# Patient Record
Sex: FEMALE | Race: WHITE | NOT HISPANIC OR LATINO | Employment: FULL TIME | URBAN - METROPOLITAN AREA
[De-identification: names, ages, dates, MRNs, and addresses within clinical notes are randomized per-mention and may not be internally consistent; named-entity substitution may affect disease eponyms.]

---

## 2017-05-03 LAB
25(OH)D3 SERPL-MCNC: 11 NG/ML (ref 30–100)
ALBUMIN SERPL-MCNC: 4.1 G/DL (ref 3.6–5.1)
ALBUMIN/GLOB SERPL: 1.4 (CALC) (ref 1–2.5)
ALP SERPL-CCNC: 94 U/L (ref 33–115)
ALT SERPL-CCNC: 30 U/L (ref 6–29)
APPEARANCE UR: ABNORMAL
AST SERPL-CCNC: 19 U/L (ref 10–30)
BASOPHILS # BLD AUTO: 34 CELLS/UL (ref 0–200)
BASOPHILS NFR BLD AUTO: 0.4 %
BILIRUB DIRECT SERPL-MCNC: 0.2 MG/DL
BILIRUB INDIRECT SERPL-MCNC: 0.6 MG/DL (CALC) (ref 0.2–1.2)
BILIRUB SERPL-MCNC: 0.8 MG/DL (ref 0.2–1.2)
BILIRUB UR QL STRIP: NEGATIVE
BUN SERPL-MCNC: 9 MG/DL (ref 7–25)
BUN/CREAT SERPL: NORMAL (CALC) (ref 6–22)
CALCIT SERPL-MCNC: <2 PG/ML
CALCIUM SERPL-MCNC: 9.2 MG/DL (ref 8.6–10.2)
CHLORIDE SERPL-SCNC: 104 MMOL/L (ref 98–110)
CHOLEST SERPL-MCNC: 172 MG/DL (ref 125–200)
CHOLEST/HDLC SERPL: 3.4 (CALC)
CO2 SERPL-SCNC: 26 MMOL/L (ref 20–31)
COLOR UR: YELLOW
CREAT SERPL-MCNC: 0.76 MG/DL (ref 0.5–1.1)
EOSINOPHIL # BLD AUTO: 50 CELLS/UL (ref 15–500)
EOSINOPHIL NFR BLD AUTO: 0.6 %
ERYTHROCYTE [DISTWIDTH] IN BLOOD BY AUTOMATED COUNT: 14.4 % (ref 11–15)
FERRITIN SERPL-MCNC: 79 NG/ML (ref 10–154)
FOLATE SERPL-MCNC: 11.5 NG/ML
GLOBULIN SER CALC-MCNC: 3 G/DL (CALC) (ref 1.9–3.7)
GLUCOSE SERPL-MCNC: 87 MG/DL (ref 65–99)
GLUCOSE UR QL STRIP: NEGATIVE
HAV AB SER QL IA: REACTIVE
HBA1C MFR BLD: 5 % OF TOTAL HGB
HBV CORE AB SERPL QL IA: ABNORMAL
HBV SURFACE AB SER QL IA: REACTIVE
HBV SURFACE AG SERPL QL IA: ABNORMAL
HCT VFR BLD AUTO: 42.4 % (ref 35–45)
HCV AB S/CO SERPL IA: 0.02
HCV AB SERPL QL IA: ABNORMAL
HDLC SERPL-MCNC: 51 MG/DL
HGB BLD-MCNC: 14 G/DL (ref 11.7–15.5)
HGB UR QL STRIP: NEGATIVE
KETONES UR QL STRIP: NEGATIVE
LDLC SERPL CALC-MCNC: 101 MG/DL (CALC)
LEUKOCYTE ESTERASE UR QL STRIP: NEGATIVE
LYMPHOCYTES # BLD AUTO: 2495 CELLS/UL (ref 850–3900)
LYMPHOCYTES NFR BLD AUTO: 29.7 %
MAGNESIUM SERPL-MCNC: 2.3 MG/DL (ref 1.5–2.5)
MCH RBC QN AUTO: 28 PG (ref 27–33)
MCHC RBC AUTO-ENTMCNC: 33 G/DL (ref 32–36)
MCV RBC AUTO: 84.8 FL (ref 80–100)
MONOCYTES # BLD AUTO: 361 CELLS/UL (ref 200–950)
MONOCYTES NFR BLD AUTO: 4.3 %
NEUTROPHILS # BLD AUTO: 5460 CELLS/UL (ref 1500–7800)
NEUTROPHILS NFR BLD AUTO: 65 %
NITRITE UR QL STRIP: NEGATIVE
NONHDLC SERPL-MCNC: 121 MG/DL (CALC)
PH UR STRIP: 8 [PH] (ref 5–8)
PLATELET # BLD AUTO: 312 THOUSAND/UL (ref 140–400)
PMV BLD REES-ECKER: 8 FL (ref 7.5–12.5)
POTASSIUM SERPL-SCNC: 4.3 MMOL/L (ref 3.5–5.3)
PROT SERPL-MCNC: 7.1 G/DL (ref 6.1–8.1)
PROT UR QL STRIP: NEGATIVE
RBC # BLD AUTO: 5 MILLION/UL (ref 3.8–5.1)
SL AMB EGFR AFRICAN AMERICAN: 123 ML/MIN/1.73M2
SL AMB EGFR NON AFRICAN AMERICAN: 106 ML/MIN/1.73M2
SODIUM SERPL-SCNC: 138 MMOL/L (ref 135–146)
SP GR UR STRIP: 1.02 (ref 1–1.03)
T4 FREE SERPL-MCNC: 0.8 NG/DL (ref 0.8–1.8)
THYROGLOB AB SERPL-ACNC: <1 IU/ML
THYROPEROXIDASE AB SERPL-ACNC: 2 IU/ML
TRIGL SERPL-MCNC: 98 MG/DL
TSH SERPL-ACNC: 4.88 MIU/L
UREA BREATH TEST QL: NOT DETECTED
VIT B12 SERPL-MCNC: 293 PG/ML (ref 200–1100)
WBC # BLD AUTO: 8.4 THOUSAND/UL (ref 3.8–10.8)

## 2018-03-05 ENCOUNTER — CLINICAL SUPPORT (OUTPATIENT)
Dept: OBGYN CLINIC | Facility: MEDICAL CENTER | Age: 33
End: 2018-03-05
Payer: COMMERCIAL

## 2018-03-05 DIAGNOSIS — Z32.01 POSITIVE URINE PREGNANCY TEST: ICD-10-CM

## 2018-03-05 PROCEDURE — 36415 COLL VENOUS BLD VENIPUNCTURE: CPT | Performed by: OBSTETRICS & GYNECOLOGY

## 2018-03-05 NOTE — PROGRESS NOTES
Pt presents in office for pregnancy lab work due to positive urine test  Pt has Beta HCG and Progesterone drawn today  Was not able to get Type and Screen due to pt not tolerating blood draw to well  Will draw at next visit

## 2018-03-06 DIAGNOSIS — Z36.89 ENCOUNTER FOR ULTRASOUND TO DETERMINE FETAL LOCATION: Primary | ICD-10-CM

## 2018-03-06 LAB
HCG INTACT+B SERPL-ACNC: NORMAL MIU/ML
PROGEST SERPL-MCNC: 21.3 NG/ML

## 2018-03-12 ENCOUNTER — HOSPITAL ENCOUNTER (OUTPATIENT)
Dept: ULTRASOUND IMAGING | Facility: MEDICAL CENTER | Age: 33
Discharge: HOME | End: 2018-03-12
Payer: COMMERCIAL

## 2018-03-12 DIAGNOSIS — Z36.89 ENCOUNTER FOR ULTRASOUND TO DETERMINE FETAL LOCATION: ICD-10-CM

## 2018-03-12 PROCEDURE — 76801 OB US < 14 WKS SINGLE FETUS: CPT

## 2018-03-26 ENCOUNTER — INITIAL PRENATAL (OUTPATIENT)
Dept: OBGYN CLINIC | Facility: MEDICAL CENTER | Age: 33
End: 2018-03-26
Payer: COMMERCIAL

## 2018-03-26 DIAGNOSIS — Z3A.10 10 WEEKS GESTATION OF PREGNANCY: Primary | ICD-10-CM

## 2018-03-26 LAB
ABO GROUP BLD: NORMAL
BASOPHILS # BLD AUTO: 0.02 THOUSANDS/ΜL (ref 0–0.1)
BASOPHILS NFR BLD AUTO: 0 % (ref 0–1)
BILIRUB UR QL STRIP: NEGATIVE
BLD GP AB SCN SERPL QL: NEGATIVE
CLARITY UR: CLEAR
COLOR UR: YELLOW
EOSINOPHIL # BLD AUTO: 0.09 THOUSAND/ΜL (ref 0–0.61)
EOSINOPHIL NFR BLD AUTO: 1 % (ref 0–6)
ERYTHROCYTE [DISTWIDTH] IN BLOOD BY AUTOMATED COUNT: 12.9 % (ref 11.6–15.1)
GLUCOSE UR STRIP-MCNC: NEGATIVE MG/DL
HCT VFR BLD AUTO: 38.3 % (ref 34.8–46.1)
HGB BLD-MCNC: 13.7 G/DL (ref 11.5–15.4)
HGB UR QL STRIP.AUTO: NEGATIVE
KETONES UR STRIP-MCNC: NEGATIVE MG/DL
LEUKOCYTE ESTERASE UR QL STRIP: NEGATIVE
LYMPHOCYTES # BLD AUTO: 3.41 THOUSANDS/ΜL (ref 0.6–4.47)
LYMPHOCYTES NFR BLD AUTO: 27 % (ref 14–44)
MCH RBC QN AUTO: 29.2 PG (ref 26.8–34.3)
MCHC RBC AUTO-ENTMCNC: 35.8 G/DL (ref 31.4–37.4)
MCV RBC AUTO: 82 FL (ref 82–98)
MONOCYTES # BLD AUTO: 0.6 THOUSAND/ΜL (ref 0.17–1.22)
MONOCYTES NFR BLD AUTO: 5 % (ref 4–12)
NEUTROPHILS # BLD AUTO: 8.66 THOUSANDS/ΜL (ref 1.85–7.62)
NEUTS SEG NFR BLD AUTO: 67 % (ref 43–75)
NITRITE UR QL STRIP: NEGATIVE
NRBC BLD AUTO-RTO: 0 /100 WBCS
PH UR STRIP.AUTO: 6.5 [PH] (ref 4.5–8)
PLATELET # BLD AUTO: 360 THOUSANDS/UL (ref 149–390)
PMV BLD AUTO: 9.2 FL (ref 8.9–12.7)
PROT UR STRIP-MCNC: NEGATIVE MG/DL
RBC # BLD AUTO: 4.69 MILLION/UL (ref 3.81–5.12)
RH BLD: POSITIVE
SP GR UR STRIP.AUTO: 1.01 (ref 1–1.03)
SPECIMEN EXPIRATION DATE: NORMAL
TSH SERPL DL<=0.05 MIU/L-ACNC: 1.3 UIU/ML (ref 0.36–3.74)
UROBILINOGEN UR QL STRIP.AUTO: 0.2 E.U./DL
WBC # BLD AUTO: 12.83 THOUSAND/UL (ref 4.31–10.16)

## 2018-03-26 PROCEDURE — OBC: Performed by: OBSTETRICS & GYNECOLOGY

## 2018-03-26 PROCEDURE — 80081 OBSTETRIC PANEL INC HIV TSTG: CPT | Performed by: OBSTETRICS & GYNECOLOGY

## 2018-03-26 PROCEDURE — 81003 URINALYSIS AUTO W/O SCOPE: CPT | Performed by: OBSTETRICS & GYNECOLOGY

## 2018-03-26 PROCEDURE — 87086 URINE CULTURE/COLONY COUNT: CPT | Performed by: OBSTETRICS & GYNECOLOGY

## 2018-03-26 PROCEDURE — 84443 ASSAY THYROID STIM HORMONE: CPT | Performed by: OBSTETRICS & GYNECOLOGY

## 2018-03-26 PROCEDURE — 36415 COLL VENOUS BLD VENIPUNCTURE: CPT | Performed by: OBSTETRICS & GYNECOLOGY

## 2018-03-26 RX ORDER — SWAB
1 SWAB, NON-MEDICATED MISCELLANEOUS DAILY
COMMUNITY
End: 2019-12-03 | Stop reason: ALTCHOICE

## 2018-03-26 NOTE — PROGRESS NOTES
OB ED visit completed  Denies MRSA or travel  Encouraged to schedule dental appt    PAP completed 10/2017 outside--result available in chart

## 2018-03-27 LAB
BACTERIA UR CULT: NORMAL
HBV SURFACE AG SER QL: NORMAL
RPR SER QL: NORMAL
RUBV IGG SERPL IA-ACNC: >175 IU/ML

## 2018-03-28 LAB — HIV 1+2 AB+HIV1 P24 AG SERPL QL IA: NORMAL

## 2018-04-11 ENCOUNTER — TELEPHONE (OUTPATIENT)
Dept: OBGYN CLINIC | Facility: MEDICAL CENTER | Age: 33
End: 2018-04-11

## 2018-04-11 NOTE — TELEPHONE ENCOUNTER
----- Message from Jaclyn Vaughn MA sent at 4/11/2018  8:03 AM EDT -----  Regarding: Preg Pt - Sick  Pt called and lmovm that she is a pregnant pt and is sick this morning, would like to talk to someone   Please call at [phone]

## 2018-04-11 NOTE — TELEPHONE ENCOUNTER
Telephone call from patient  C/O stuffy nose    Denies cough or fever  Unsure if cold vs  Allergy  Advised supportive measures  OK to try Claritin/Zyrtec/Allegra OTC without decongestant  Call back with any additional questions or concerns

## 2018-04-12 ENCOUNTER — ROUTINE PRENATAL (OUTPATIENT)
Dept: PERINATAL CARE | Facility: CLINIC | Age: 33
End: 2018-04-12
Payer: COMMERCIAL

## 2018-04-12 VITALS
SYSTOLIC BLOOD PRESSURE: 106 MMHG | DIASTOLIC BLOOD PRESSURE: 66 MMHG | BODY MASS INDEX: 33.05 KG/M2 | WEIGHT: 179.6 LBS | HEART RATE: 86 BPM | HEIGHT: 62 IN

## 2018-04-12 DIAGNOSIS — Z3A.12 12 WEEKS GESTATION OF PREGNANCY: ICD-10-CM

## 2018-04-12 DIAGNOSIS — Z36.82 ENCOUNTER FOR ANTENATAL SCREENING FOR NUCHAL TRANSLUCENCY: Primary | ICD-10-CM

## 2018-04-12 DIAGNOSIS — Z3A.10 10 WEEKS GESTATION OF PREGNANCY: ICD-10-CM

## 2018-04-12 PROCEDURE — 76813 OB US NUCHAL MEAS 1 GEST: CPT | Performed by: OBSTETRICS & GYNECOLOGY

## 2018-04-12 PROCEDURE — 99201 PR OFFICE OUTPATIENT NEW 10 MINUTES: CPT | Performed by: OBSTETRICS & GYNECOLOGY

## 2018-04-12 NOTE — LETTER
April 12, 2018     Ann Sahu MD  [address]    Patient: Eric Brennan   YOB: 1985   Date of Visit: 4/12/2018       Dear Dr Joseph Brody:    Thank you for referring Eric Brennan to me for evaluation  Below are my notes for this consultation  If you have questions, please do not hesitate to call me  I look forward to following your patient along with you  Sincerely,        Shahla Nance MD        CC: No Recipients  Shahla Nance MD  4/12/2018  1:37 PM  Sign at close encounter  Please refer to the [facility] ultrasound report in Ob Procedures for additional information regarding the visit to the [facility]  today

## 2018-04-12 NOTE — PROGRESS NOTES
Please refer to the [facility] ultrasound report in Ob Procedures for additional information regarding the visit to the [facility]  today

## 2018-04-16 ENCOUNTER — INITIAL PRENATAL (OUTPATIENT)
Dept: OBGYN CLINIC | Facility: MEDICAL CENTER | Age: 33
End: 2018-04-16

## 2018-04-16 VITALS — WEIGHT: 180.3 LBS | DIASTOLIC BLOOD PRESSURE: 60 MMHG | BODY MASS INDEX: 32.98 KG/M2 | SYSTOLIC BLOOD PRESSURE: 98 MMHG

## 2018-04-16 DIAGNOSIS — Z34.91 FIRST TRIMESTER PREGNANCY: ICD-10-CM

## 2018-04-16 DIAGNOSIS — Z3A.12 12 WEEKS GESTATION OF PREGNANCY: Primary | ICD-10-CM

## 2018-04-16 PROCEDURE — PNV: Performed by: NURSE PRACTITIONER

## 2018-04-16 NOTE — PROGRESS NOTES
Mansoor Kay is a 29y o  year old  at 14w10d for first prenatal visit  Nausea No Vomiting No   Exam done today - see OB flowsheet  Pap done Yes [email or web address] w neg pap and cotesting  Gonorrhea and Chlamydia sent, repeat urine cc culture sent  Labs reviewed    Genetic testing : had part 1 of seq  Screen  Has level 2 scheduled  Pt has been counseled re diet, exercise, weight gain, foods to avoid, vaccines in pregnancy, trisomy screening, travel precautions to include seat belt use and VTE risk reduction  She has been provided our pregnancy packet which includes how and when to contact providers, medication recommendations, dietary suggestions, breastfeeding information as well as websites for additional information, hospital and delivery concerns

## 2018-04-18 ENCOUNTER — TELEPHONE (OUTPATIENT)
Dept: PERINATAL CARE | Facility: CLINIC | Age: 33
End: 2018-04-18

## 2018-04-19 ENCOUNTER — TELEPHONE (OUTPATIENT)
Dept: OBGYN CLINIC | Facility: MEDICAL CENTER | Age: 33
End: 2018-04-19

## 2018-04-19 LAB
BACTERIA UR CULT: NORMAL
C TRACH RRNA SPEC QL NAA+PROBE: NEGATIVE
Lab: NO GROWTH
N GONORRHOEA RRNA SPEC QL NAA+PROBE: NEGATIVE

## 2018-04-19 NOTE — TELEPHONE ENCOUNTER
Telephone call from patient  States she has had tooth pain x1 week and wonders what shje should do  Advised tylenol ok and instructed patient to contact dentist

## 2018-05-10 ENCOUNTER — ROUTINE PRENATAL (OUTPATIENT)
Dept: OBGYN CLINIC | Facility: MEDICAL CENTER | Age: 33
End: 2018-05-10
Payer: COMMERCIAL

## 2018-05-10 ENCOUNTER — TELEPHONE (OUTPATIENT)
Dept: OBGYN CLINIC | Facility: MEDICAL CENTER | Age: 33
End: 2018-05-10

## 2018-05-10 VITALS — DIASTOLIC BLOOD PRESSURE: 62 MMHG | WEIGHT: 176.8 LBS | BODY MASS INDEX: 32.34 KG/M2 | SYSTOLIC BLOOD PRESSURE: 112 MMHG

## 2018-05-10 DIAGNOSIS — G44.89 HEADACHE ASSOCIATED WITH HORMONAL FACTORS: ICD-10-CM

## 2018-05-10 DIAGNOSIS — O21.9 NAUSEA AND VOMITING IN PREGNANCY PRIOR TO 22 WEEKS GESTATION: Primary | ICD-10-CM

## 2018-05-10 DIAGNOSIS — K59.00 CONSTIPATION, UNSPECIFIED CONSTIPATION TYPE: ICD-10-CM

## 2018-05-10 PROCEDURE — 99214 OFFICE O/P EST MOD 30 MIN: CPT | Performed by: NURSE PRACTITIONER

## 2018-05-10 NOTE — TELEPHONE ENCOUNTER
Pt  Calling c/o feeling faint, nausea, dizziness and headache  States she has had headache since last night, unrelieved with tylenol  Appt   Given to evaluate

## 2018-05-10 NOTE — PROGRESS NOTES
Assessment There are no diagnoses linked to this encounter  Plan    Subjective   Lori Meehan is a 29 y o  female here for a problem visit  Patient is complaining of ***  Sx's started {#:72403} {days/wks/mos/yrs:628060} ago  Patient reports that sx's {LAB AP GROSS PHOTOS ARE/ARE NOT:19810::"are not"} related to her menses  There is no problem list on file for this patient  Gynecologic History  Patient's last menstrual period was 01/17/2018 (exact date)  The current method of family planning is {contraception:684173}  Past Medical History:   Diagnosis Date    Abnormal Pap smear of cervix     Disease of thyroid gland     h/o thyroid nodules    HPV (human papilloma virus) infection     Migraine     Urinary tract infection     utix1 in past    Varicella     positive history     Past Surgical History:   Procedure Laterality Date    TONSILLECTOMY       Family History   Problem Relation Age of Onset    Diabetes Father     Hyperlipidemia Father     Hypertension Father     No Known Problems Sister     Heart defect Brother     Diabetes Paternal Grandfather      Social History     Social History    Marital status: /Civil Union     Spouse name: N/A    Number of children: N/A    Years of education: N/A     Occupational History    Not on file       Social History Main Topics    Smoking status: Former Smoker    Smokeless tobacco: Never Used      Comment: Quit with knowledge of pregnancy    Alcohol use No    Drug use: No    Sexual activity: Yes     Partners: Male     Other Topics Concern    Not on file     Social History Narrative    No narrative on file     No Known Allergies    Current Outpatient Prescriptions:     Prenatal Vit-Fe Fumarate-FA (PRENATAL MULTIVITAMIN) 28-0 8 MG TABS, Take 1 tablet by mouth daily, Disp: , Rfl:     Review of Systems  Constitutional :no fever, feels well, no tiredness, no recent weight gain or loss  ENT: no ear ache, no loss of hearing, no nosebleeds or nasal discharge, no sore throat or hoarseness  Cardiovascular: no complaints of slow or fast heart beat, no chest pain, no palpitations, no leg claudication or lower extremity edema  Respiratory: no complaints of shortness of shortness of breath, no CAI  Breasts:no complaints of breast pain, breast lump, or nipple discharge  Gastrointestinal: no complaints of abdominal pain, constipation, nausea, vomiting, or diarrhea or bloody stools  Genitourinary : no complaints of dysuria, incontinence, pelvic pain, no dysmenorrhea, vaginal discharge or abnormal vaginal bleeding and as noted in HPI  Musculoskeletal: no complaints of arthralgia, no myalgia, no joint swelling or stiffness, no limb pain or swelling  Integumentary: no complaints of skin rash or lesion, itching or dry skin  Neurological: no complaints of headache, no confusion, no numbness or tingling, no dizziness or fainting     Objective     /62   Wt 80 2 kg (176 lb 12 8 oz)   LMP 01/17/2018 (Exact Date)   BMI 32 34 kg/m²     General:   appears stated age, cooperative, alert normal mood and affect   Neck: normal, supple,trachea midline, no masses   Heart: regular rate and rhythm, S1, S2 normal, no murmur, click, rub or gallop   Lungs: clear to auscultation bilaterally   Breasts: {EXAM; BREAST:30209}   Abdomen: soft, non-tender, without masses or organomegaly   Vulva: {EXAM; GYN LHRGZ:63916}   Vagina: {exam; vagina:94561}   Urethra: {Urethra:99316}   Cervix: {PE Cervix:24027::"Normal, no discharge "}   Uterus: {exam; uterus:95088}   Adnexa: {exam; adnexa:93398}   Lymphatic palpation of lymph nodes in neck, axilla, groin and/or other locations: no lymphadenopathy or masses noted   Skin normal skin turgor and no rashes     Psychiatric orientation to person, place, and time: normal  mood and affect: normal

## 2018-05-10 NOTE — PROGRESS NOTES
Pt here with c/o nausea, vomiting, constipation and headaches  States didn't eat all day b/c stomach upset and occac  Vomits small amounts  Vomits up water  urine neg  Temp  98 6  Advised her to focus on getting more fluids in by taking sips of water,or  fluid of her choice q 1/2 to 1 hour    rec  Colace/senokot for constipation, will get more fiber in her diet  when able to tolerate solids  Gave samples of Bonjesta with instructions for use to try for n/v   Pt to call if sxs do not resolve with above recommendations, or if unable to tolerate fluids  Has pnv with Dr Koffi Charlton next week

## 2018-05-16 ENCOUNTER — ROUTINE PRENATAL (OUTPATIENT)
Dept: OBGYN CLINIC | Facility: MEDICAL CENTER | Age: 33
End: 2018-05-16

## 2018-05-16 VITALS — DIASTOLIC BLOOD PRESSURE: 72 MMHG | WEIGHT: 181.3 LBS | BODY MASS INDEX: 33.16 KG/M2 | SYSTOLIC BLOOD PRESSURE: 104 MMHG

## 2018-05-16 DIAGNOSIS — Z34.92 SECOND TRIMESTER PREGNANCY: Primary | ICD-10-CM

## 2018-05-16 PROCEDURE — PNV: Performed by: OBSTETRICS & GYNECOLOGY

## 2018-05-16 NOTE — PROGRESS NOTES
Flor Sam is a 29y o  year old  at 17w0d for routine prenatal visit    + FM, no vaginal bleeding, contractions, or LOF  Complaints: No   Most recent ultrasound and labs reviewed    Second part of sequential collected  Has level 2 scheduled

## 2018-05-23 ENCOUNTER — TELEPHONE (OUTPATIENT)
Dept: PERINATAL CARE | Facility: CLINIC | Age: 33
End: 2018-05-23

## 2018-06-07 ENCOUNTER — ROUTINE PRENATAL (OUTPATIENT)
Dept: PERINATAL CARE | Facility: CLINIC | Age: 33
End: 2018-06-07
Payer: COMMERCIAL

## 2018-06-07 VITALS
WEIGHT: 183.2 LBS | SYSTOLIC BLOOD PRESSURE: 109 MMHG | DIASTOLIC BLOOD PRESSURE: 72 MMHG | HEART RATE: 88 BPM | HEIGHT: 62 IN | BODY MASS INDEX: 33.71 KG/M2

## 2018-06-07 DIAGNOSIS — O99.210 OBESITY AFFECTING PREGNANCY, ANTEPARTUM: Primary | ICD-10-CM

## 2018-06-07 DIAGNOSIS — Z36.86 ENCOUNTER FOR ANTENATAL SCREENING FOR CERVICAL LENGTH: ICD-10-CM

## 2018-06-07 DIAGNOSIS — Z3A.20 20 WEEKS GESTATION OF PREGNANCY: ICD-10-CM

## 2018-06-07 PROBLEM — R87.810 CERVICAL HIGH RISK HPV (HUMAN PAPILLOMAVIRUS) TEST POSITIVE: Status: ACTIVE | Noted: 2017-10-18

## 2018-06-07 PROCEDURE — 76817 TRANSVAGINAL US OBSTETRIC: CPT | Performed by: OBSTETRICS & GYNECOLOGY

## 2018-06-07 PROCEDURE — 76811 OB US DETAILED SNGL FETUS: CPT | Performed by: OBSTETRICS & GYNECOLOGY

## 2018-06-07 NOTE — PROGRESS NOTES
A transvaginal ultrasound was performed  Sonographer note on use of High Level Disinfection Process (Trophon) for transvaginal probe# 2 used, serial [id number]    Shaniqua Huffman RDMS, RDCS

## 2018-06-14 ENCOUNTER — ROUTINE PRENATAL (OUTPATIENT)
Dept: OBGYN CLINIC | Facility: MEDICAL CENTER | Age: 33
End: 2018-06-14

## 2018-06-14 VITALS — DIASTOLIC BLOOD PRESSURE: 70 MMHG | SYSTOLIC BLOOD PRESSURE: 122 MMHG | BODY MASS INDEX: 34.02 KG/M2 | WEIGHT: 186 LBS

## 2018-06-14 DIAGNOSIS — Z34.02 ENCOUNTER FOR SUPERVISION OF NORMAL FIRST PREGNANCY IN SECOND TRIMESTER: Primary | ICD-10-CM

## 2018-06-14 DIAGNOSIS — Z3A.21 21 WEEKS GESTATION OF PREGNANCY: ICD-10-CM

## 2018-06-14 PROCEDURE — PNV: Performed by: OBSTETRICS & GYNECOLOGY

## 2018-06-14 NOTE — PROGRESS NOTES
David Shirley is a 29y o  year old  at 18w1d for routine prenatal visit    + FM, no vaginal bleeding, contractions, or LOF  Complaints: No   Most recent ultrasound and labs reviewed    Following at [facility] for missed anatomy  All questions answered  Going to [place] for vacation - copy of records given

## 2018-06-19 ENCOUNTER — ULTRASOUND (OUTPATIENT)
Dept: PERINATAL CARE | Facility: CLINIC | Age: 33
End: 2018-06-19
Payer: COMMERCIAL

## 2018-06-19 VITALS
SYSTOLIC BLOOD PRESSURE: 106 MMHG | DIASTOLIC BLOOD PRESSURE: 69 MMHG | WEIGHT: 185.6 LBS | HEART RATE: 91 BPM | BODY MASS INDEX: 34.16 KG/M2 | HEIGHT: 62 IN

## 2018-06-19 DIAGNOSIS — Z36.89 ENCOUNTER FOR FETAL ANATOMIC SURVEY: ICD-10-CM

## 2018-06-19 DIAGNOSIS — Z3A.21 21 WEEKS GESTATION OF PREGNANCY: Primary | ICD-10-CM

## 2018-06-19 PROCEDURE — 76816 OB US FOLLOW-UP PER FETUS: CPT | Performed by: OBSTETRICS & GYNECOLOGY

## 2018-06-19 NOTE — LETTER
June 19, 2018     Yoni Conroy MD  [address] [address]    Patient: Patricia Almaraz   YOB: 1985   Date of Visit: 6/19/2018       Dear Dr Mesha Adams: Thank you for referring Patricia Almaraz to me for evaluation  Below are my notes for this consultation  If you have questions, please do not hesitate to call me  I look forward to following your patient along with you  Sincerely,        Cindy Peterson MD        CC: No Recipients  Cindy Peterson MD  6/19/2018  8:06 AM  Sign at close encounter  Please refer to the [facility] ultrasound report in Ob Procedures for additional information regarding the visit to the [facility]  today

## 2018-06-19 NOTE — PROGRESS NOTES
Please refer to the [facility] ultrasound report in Ob Procedures for additional information regarding the visit to the [facility]  today

## 2018-07-18 ENCOUNTER — ROUTINE PRENATAL (OUTPATIENT)
Dept: OBGYN CLINIC | Facility: MEDICAL CENTER | Age: 33
End: 2018-07-18

## 2018-07-18 VITALS — BODY MASS INDEX: 34.55 KG/M2 | SYSTOLIC BLOOD PRESSURE: 108 MMHG | DIASTOLIC BLOOD PRESSURE: 72 MMHG | WEIGHT: 188.9 LBS

## 2018-07-18 DIAGNOSIS — Z34.93 THIRD TRIMESTER PREGNANCY: Primary | ICD-10-CM

## 2018-07-18 PROCEDURE — PNV: Performed by: OBSTETRICS & GYNECOLOGY

## 2018-07-18 NOTE — PROGRESS NOTES
Marge Marrero is a 29y o  year old  at 34w0d for routine prenatal visit    + FM, no vaginal bleeding, contractions, or LOF  Complaints:yes/ neck discomfort and spasm - recommend massage and supportive measures  Most recent ultrasound and labs reviewed    1hr gtt and cbc next visit   Follow up [address] scheduled for 32 weeks

## 2018-07-30 ENCOUNTER — TELEPHONE (OUTPATIENT)
Dept: OBGYN CLINIC | Facility: MEDICAL CENTER | Age: 33
End: 2018-07-30

## 2018-07-30 NOTE — TELEPHONE ENCOUNTER
Calling c/o stuffy nose and sore throat Denies fever  Questions what she can take  Supportive measures discussed    Advised to see PCP if symptoms do not improve

## 2018-08-01 ENCOUNTER — CLINICAL SUPPORT (OUTPATIENT)
Dept: OBGYN CLINIC | Facility: MEDICAL CENTER | Age: 33
End: 2018-08-01
Payer: COMMERCIAL

## 2018-08-01 DIAGNOSIS — Z34.92 ENCOUNTER FOR PREGNANCY RELATED EXAMINATION IN SECOND TRIMESTER: Primary | ICD-10-CM

## 2018-08-01 PROCEDURE — 36415 COLL VENOUS BLD VENIPUNCTURE: CPT | Performed by: OBSTETRICS & GYNECOLOGY

## 2018-08-02 LAB
BASOPHILS # BLD AUTO: 0 X10E3/UL (ref 0–0.2)
BASOPHILS NFR BLD AUTO: 0 %
EOSINOPHIL # BLD AUTO: 0.2 X10E3/UL (ref 0–0.4)
EOSINOPHIL NFR BLD AUTO: 2 %
ERYTHROCYTE [DISTWIDTH] IN BLOOD BY AUTOMATED COUNT: 13.5 % (ref 12.3–15.4)
GLUCOSE 1H P 50 G GLC PO SERPL-MCNC: 137 MG/DL (ref 65–139)
HCT VFR BLD AUTO: 36.2 % (ref 34–46.6)
HGB BLD-MCNC: 11.7 G/DL (ref 11.1–15.9)
IMM GRANULOCYTES # BLD: 0.1 X10E3/UL (ref 0–0.1)
IMM GRANULOCYTES NFR BLD: 1 %
LYMPHOCYTES # BLD AUTO: 2 X10E3/UL (ref 0.7–3.1)
LYMPHOCYTES NFR BLD AUTO: 18 %
MCH RBC QN AUTO: 27.6 PG (ref 26.6–33)
MCHC RBC AUTO-ENTMCNC: 32.3 G/DL (ref 31.5–35.7)
MCV RBC AUTO: 85 FL (ref 79–97)
MONOCYTES # BLD AUTO: 0.4 X10E3/UL (ref 0.1–0.9)
MONOCYTES NFR BLD AUTO: 3 %
NEUTROPHILS # BLD AUTO: 8.9 X10E3/UL (ref 1.4–7)
NEUTROPHILS NFR BLD AUTO: 76 %
PLATELET # BLD AUTO: 289 X10E3/UL (ref 150–379)
RBC # BLD AUTO: 4.24 X10E6/UL (ref 3.77–5.28)
WBC # BLD AUTO: 11.6 X10E3/UL (ref 3.4–10.8)

## 2018-08-06 ENCOUNTER — TELEPHONE (OUTPATIENT)
Dept: OBGYN CLINIC | Facility: MEDICAL CENTER | Age: 33
End: 2018-08-06

## 2018-08-06 DIAGNOSIS — R73.09 ELEVATED GLUCOSE TOLERANCE TEST: Primary | ICD-10-CM

## 2018-08-06 NOTE — TELEPHONE ENCOUNTER
----- Message from Ade Martini MD sent at 8/3/2018  4:29 PM EDT -----  Abnormal 1 hr gtt - needs 3 hr gtt

## 2018-08-11 ENCOUNTER — TELEPHONE (OUTPATIENT)
Dept: LABOR AND DELIVERY | Facility: HOSPITAL | Age: 33
End: 2018-08-11

## 2018-08-11 LAB
GLUCOSE 1H P 100 G GLC PO SERPL-MCNC: 154 MG/DL (ref 65–179)
GLUCOSE 2H P 100 G GLC PO SERPL-MCNC: 134 MG/DL (ref 65–154)
GLUCOSE 3H P 100 G GLC PO SERPL-MCNC: 99 MG/DL (ref 65–139)
GLUCOSE P FAST SERPL-MCNC: 94 MG/DL (ref 65–94)
SL AMB NOTE:: (no result)

## 2018-08-11 NOTE — TELEPHONE ENCOUNTER
Late Entry  Pt pages to report decreased fetal movement since her glucose tolerance test  Pt denies vb, lof, ctx  When asked if patient did [address], she report she did not  Pt advised to sit in a quiet room and perform [address] and call back with results  Pt called back via paging service ~2 hours later and reported she had significantly more than 10 movement in 2 hours and she has been reassured

## 2018-08-13 ENCOUNTER — TELEPHONE (OUTPATIENT)
Dept: OBGYN CLINIC | Facility: MEDICAL CENTER | Age: 33
End: 2018-08-13

## 2018-08-13 NOTE — TELEPHONE ENCOUNTER
Telephone call from patient  C/O pain "in bones of my pelvis"  Denies bleeding  LOF, contractions  States she is feeling fetal movement  Advised to rest, increase fluids  Call back with any changes  Also states that she tripped while climbing stairs yesterday  Did not bump or hit stomach in any way  Advised to rest, monitor    Call back immediately with any changes

## 2018-08-15 ENCOUNTER — ROUTINE PRENATAL (OUTPATIENT)
Dept: OBGYN CLINIC | Facility: MEDICAL CENTER | Age: 33
End: 2018-08-15

## 2018-08-15 VITALS — WEIGHT: 189 LBS | BODY MASS INDEX: 34.57 KG/M2 | SYSTOLIC BLOOD PRESSURE: 100 MMHG | DIASTOLIC BLOOD PRESSURE: 60 MMHG

## 2018-08-15 DIAGNOSIS — Z3A.30 30 WEEKS GESTATION OF PREGNANCY: Primary | ICD-10-CM

## 2018-08-15 PROCEDURE — PNV: Performed by: NURSE PRACTITIONER

## 2018-08-15 NOTE — PROGRESS NOTES
Millicent Gonzalez is a 29y o  year old  at 30w0d for routine prenatal visit    + FM, no vaginal bleeding, contractions, or LOF  Complaints: occas  Pelvic pain with movement  Rec  Belly band, Will consider pt referral if persist    Most recent ultrasound and labs reviewed  Passed 3hr gtt, all levels were normal!!  Has growth scan on   Gave 28 wk booklet, bp, pbv info  Fkc, ptl precautions reviewed

## 2018-08-28 ENCOUNTER — ROUTINE PRENATAL (OUTPATIENT)
Dept: OBGYN CLINIC | Facility: MEDICAL CENTER | Age: 33
End: 2018-08-28

## 2018-08-28 VITALS — BODY MASS INDEX: 35.01 KG/M2 | WEIGHT: 191.4 LBS | SYSTOLIC BLOOD PRESSURE: 118 MMHG | DIASTOLIC BLOOD PRESSURE: 68 MMHG

## 2018-08-28 DIAGNOSIS — Z34.93 THIRD TRIMESTER PREGNANCY: Primary | ICD-10-CM

## 2018-08-28 PROCEDURE — PNV: Performed by: OBSTETRICS & GYNECOLOGY

## 2018-08-28 NOTE — PROGRESS NOTES
Reji Kc is a 29y o  year old  at 32w8d for routine prenatal visit    + FM, no vaginal bleeding, contractions, or LOF  Complaints: No   Most recent ultrasound and labs reviewed    Birth plan discussed and signed

## 2018-09-04 ENCOUNTER — ULTRASOUND (OUTPATIENT)
Dept: PERINATAL CARE | Facility: CLINIC | Age: 33
End: 2018-09-04
Payer: COMMERCIAL

## 2018-09-04 VITALS
HEART RATE: 96 BPM | BODY MASS INDEX: 34.89 KG/M2 | DIASTOLIC BLOOD PRESSURE: 68 MMHG | HEIGHT: 62 IN | SYSTOLIC BLOOD PRESSURE: 104 MMHG | WEIGHT: 189.6 LBS

## 2018-09-04 DIAGNOSIS — Z3A.32 32 WEEKS GESTATION OF PREGNANCY: ICD-10-CM

## 2018-09-04 DIAGNOSIS — O99.210 OBESITY AFFECTING PREGNANCY, ANTEPARTUM: Primary | ICD-10-CM

## 2018-09-04 PROCEDURE — 76816 OB US FOLLOW-UP PER FETUS: CPT | Performed by: OBSTETRICS & GYNECOLOGY

## 2018-09-04 RX ORDER — LANOLIN ALCOHOL/MO/W.PET/CERES
CREAM (GRAM) TOPICAL EVERY 24 HOURS
COMMUNITY
Start: 2017-05-08 | End: 2018-09-13 | Stop reason: ALTCHOICE

## 2018-09-04 RX ORDER — PANTOPRAZOLE SODIUM 40 MG/1
TABLET, DELAYED RELEASE ORAL
COMMUNITY
Start: 2017-04-03 | End: 2018-09-13 | Stop reason: ALTCHOICE

## 2018-09-04 RX ORDER — CHOLECALCIFEROL (VITAMIN D3) 125 MCG
TABLET ORAL
COMMUNITY
Start: 2017-05-08 | End: 2018-09-13 | Stop reason: ALTCHOICE

## 2018-09-04 NOTE — PATIENT INSTRUCTIONS

## 2018-09-13 ENCOUNTER — ROUTINE PRENATAL (OUTPATIENT)
Dept: OBGYN CLINIC | Facility: MEDICAL CENTER | Age: 33
End: 2018-09-13

## 2018-09-13 VITALS — BODY MASS INDEX: 34.93 KG/M2 | SYSTOLIC BLOOD PRESSURE: 120 MMHG | DIASTOLIC BLOOD PRESSURE: 66 MMHG | WEIGHT: 191 LBS

## 2018-09-13 DIAGNOSIS — Z3A.34 34 WEEKS GESTATION OF PREGNANCY: ICD-10-CM

## 2018-09-13 DIAGNOSIS — Z34.03 ENCOUNTER FOR SUPERVISION OF NORMAL FIRST PREGNANCY IN THIRD TRIMESTER: Primary | ICD-10-CM

## 2018-09-13 PROCEDURE — PNV: Performed by: OBSTETRICS & GYNECOLOGY

## 2018-09-13 NOTE — PROGRESS NOTES
Emigdio Mcclure is a 34y o  year old  at 34w1d for routine prenatal visit    + FM, no vaginal bleeding, contractions, or LOF  Complaints: no   Most recent ultrasound and labs reviewed    Tdap at next visit, GBS at next visit  Offer flu shot if available   PTL precautions

## 2018-09-18 ENCOUNTER — HOSPITAL ENCOUNTER (OUTPATIENT)
Facility: HOSPITAL | Age: 33
Discharge: HOME | End: 2018-09-18
Attending: OBSTETRICS & GYNECOLOGY | Admitting: OBSTETRICS & GYNECOLOGY
Payer: COMMERCIAL

## 2018-09-18 ENCOUNTER — TELEPHONE (OUTPATIENT)
Dept: OBGYN CLINIC | Facility: MEDICAL CENTER | Age: 33
End: 2018-09-18

## 2018-09-18 VITALS — TEMPERATURE: 97.5 F | RESPIRATION RATE: 18 BRPM | BODY MASS INDEX: 35.15 KG/M2 | HEIGHT: 62 IN | WEIGHT: 191 LBS

## 2018-09-18 DIAGNOSIS — Z3A.34 34 WEEKS GESTATION OF PREGNANCY: ICD-10-CM

## 2018-09-18 PROCEDURE — 99202 OFFICE O/P NEW SF 15 MIN: CPT

## 2018-09-18 NOTE — LETTER
[address] AND DELIVERY  Trenton Schmid  Dept: [phone]    September 18, 2018     Patient: Mirza Carmona   YOB: 1985   Date of Visit: 9/18/2018       To Whom it May Concern:    Mirza Carmona is under my professional care  She was seen in the hospital from 9/18/2018   to 09/18/18  She may return to work on 9/19/18  If you have any questions or concerns, please don't hesitate to call           Sincerely,          Vandana Cazares MD

## 2018-09-18 NOTE — PROGRESS NOTES
Triage Note - OB  Dimas Mail 34 y o  female MRN: [id number]  Unit/Bed#: L&D  Encounter: [id number]    OB TRIAGE NOTE  Dimas Mail  [id number]  2018  1:04 PM  L&D 329/L&D 329-    ASSESS:  34 y o   34w6d with concerns for decreased fetal movement  KARINA and NST are reassuring    PLAN  - KARINA: 9 07cm  - NST: reactive, moderate variability  - Reviewed FKC  - D/C home with labor precautions    D/w Dr Antonio Melo  ______________    SUBJECTIVE    MARY: Estimated Date of Delivery: 10/24/18    HPI Chronology:  34 y o  Constanza Jameson presents with complaint of decreased fetal movement over the last few days  Patient says that she felt baby periodically, but it is much less than before  She did not do fetal kick counts at home  Contractions: intermittent  Leakage: no  Bleeding: no  Fetal Movement: decreased    Vitals:   Temp 97 5 °F (36 4 °C)   Resp 18   Ht 5' 2" (1 575 m)   Wt 86 6 kg (191 lb)   LMP 2018 (Exact Date)   BMI 34 93 kg/m²   Body mass index is 34 93 kg/m²  Review of Systems   Constitutional: Negative for diaphoresis and fever  Respiratory: Negative for apnea, shortness of breath and wheezing  Cardiovascular: Negative for chest pain and palpitations  Gastrointestinal: Negative for blood in stool and vomiting  Genitourinary: Negative for dysuria and hematuria  Neurological: Negative for dizziness and numbness  Physical Exam   Constitutional: She appears well-nourished  HENT:   Head: Normocephalic and atraumatic  Cardiovascular: Normal rate, regular rhythm and normal heart sounds  Pulmonary/Chest: Breath sounds normal  No respiratory distress  She has no wheezes  Abdominal: Bowel sounds are normal  There is no tenderness            FHT: 135 baseline, reactive, moderate variability, 15x15 accelerations seen intermittently     TOCO: Intermittent, anywhere from 1-8 minutes apart       KARINA: 9 03cm      Claudine Castillo MD  2018  1:04 PM

## 2018-09-18 NOTE — TELEPHONE ENCOUNTER
Patient c/o pain and decrease in fetal movement  States she has not felt any real movement for several days  Advised to go to L&D    L&D notified of same

## 2018-09-26 ENCOUNTER — ROUTINE PRENATAL (OUTPATIENT)
Dept: OBGYN CLINIC | Facility: MEDICAL CENTER | Age: 33
End: 2018-09-26
Payer: COMMERCIAL

## 2018-09-26 VITALS — DIASTOLIC BLOOD PRESSURE: 80 MMHG | SYSTOLIC BLOOD PRESSURE: 118 MMHG | WEIGHT: 191.8 LBS | BODY MASS INDEX: 35.08 KG/M2

## 2018-09-26 DIAGNOSIS — Z23 NEED FOR TDAP VACCINATION: ICD-10-CM

## 2018-09-26 DIAGNOSIS — Z34.93 THIRD TRIMESTER PREGNANCY: Primary | ICD-10-CM

## 2018-09-26 PROCEDURE — PNV: Performed by: OBSTETRICS & GYNECOLOGY

## 2018-09-26 PROCEDURE — 90715 TDAP VACCINE 7 YRS/> IM: CPT | Performed by: OBSTETRICS & GYNECOLOGY

## 2018-09-26 PROCEDURE — 90471 IMMUNIZATION ADMIN: CPT | Performed by: OBSTETRICS & GYNECOLOGY

## 2018-09-26 NOTE — PROGRESS NOTES
Ashley Bynum is a 34y o  year old  at 36w0d for routine prenatal visit  GBS done Yes  PCN allergy No  Labor precautions given  [address] reviewed     Tdap given today   All questions answered

## 2018-09-28 LAB — GP B STREP DNA SPEC QL NAA+PROBE: NEGATIVE

## 2018-10-02 ENCOUNTER — ROUTINE PRENATAL (OUTPATIENT)
Dept: OBGYN CLINIC | Facility: MEDICAL CENTER | Age: 33
End: 2018-10-02

## 2018-10-02 VITALS — WEIGHT: 193.5 LBS | SYSTOLIC BLOOD PRESSURE: 120 MMHG | DIASTOLIC BLOOD PRESSURE: 82 MMHG | BODY MASS INDEX: 35.39 KG/M2

## 2018-10-02 DIAGNOSIS — Z34.93 THIRD TRIMESTER PREGNANCY: Primary | ICD-10-CM

## 2018-10-02 PROCEDURE — PNV: Performed by: OBSTETRICS & GYNECOLOGY

## 2018-10-02 NOTE — PROGRESS NOTES
Darrick Salcido is a 34y o  year old  at 36w7d for routine prenatal visit    + FM, no vaginal bleeding, contractions, or LOF  Complaints: Yes - increased clear vaginal discharge : sterile speculum neagative  Pooling / negative ferning and negative nitrazine   Excellent fetal movement   Most recent ultrasound and labs reviewed    Labor precautions reviewed   GBS negative

## 2018-10-05 ENCOUNTER — HOSPITAL ENCOUNTER (OUTPATIENT)
Facility: HOSPITAL | Age: 33
Discharge: HOME | End: 2018-10-05
Attending: OBSTETRICS & GYNECOLOGY | Admitting: OBSTETRICS & GYNECOLOGY
Payer: COMMERCIAL

## 2018-10-05 VITALS — TEMPERATURE: 97.7 F | RESPIRATION RATE: 18 BRPM

## 2018-10-05 PROCEDURE — 99213 OFFICE O/P EST LOW 20 MIN: CPT

## 2018-10-05 NOTE — PROGRESS NOTES
L&D Triage Note - OB/GYN  Susanne Calvin 34 y o  female MRN: [id number]  Unit/Bed#: L&D 329-01 Encounter: [id number]      Assessment:  34 y o   at 42w2d who presents for rule out labor  Plan:  1  Rule out labor    - Patient's cervix is unchanged after 2 hours   - She is jerson on the monitor but she appears comfortable    2  Discharge home with labor precautions  Management discussed with Dr Brandan Sullivan  ______________________________________________________________________      Chief Compliant: painful contractions     TIME: 1600   Subjective: Susanne Calvin is a 34 y o   at 37w2d who presents with contractions  She felt a painful low pelvic pain that came this morning when she awoke  She started to notice that the pain came and went every five minutes and then began to think that she was having contractions  They then spaced out to every 7 minutes, then every 8 minutes  She took a hot shower and felt so much better  She went to work and then the pains returned every five minutes  Pregnancy complications: none    Leakage of fluid: none  Fetal movement: yes  Vaginal bleeding: none  Contractions: yes, q2-4 minutes     Objective: There were no vitals filed for this visit  Physical Exam   Gen: no acute distress, patient appears comfortable  SVE: 2 / 60% / -2  FHT:   140/ Moderate 6 - 25 bpm / accelerations, no decelerations  Sallis: q2-4 minutes     Management discussed with Dr Brandan Anguiano MD 10/5/2018 3:07 PM

## 2018-10-10 ENCOUNTER — ROUTINE PRENATAL (OUTPATIENT)
Dept: OBGYN CLINIC | Facility: MEDICAL CENTER | Age: 33
End: 2018-10-10

## 2018-10-10 VITALS — BODY MASS INDEX: 35.78 KG/M2 | SYSTOLIC BLOOD PRESSURE: 120 MMHG | WEIGHT: 195.6 LBS | DIASTOLIC BLOOD PRESSURE: 76 MMHG

## 2018-10-10 DIAGNOSIS — Z3A.38 38 WEEKS GESTATION OF PREGNANCY: ICD-10-CM

## 2018-10-10 DIAGNOSIS — Z34.03 ENCOUNTER FOR SUPERVISION OF NORMAL FIRST PREGNANCY IN THIRD TRIMESTER: Primary | ICD-10-CM

## 2018-10-10 PROCEDURE — PNV: Performed by: OBSTETRICS & GYNECOLOGY

## 2018-10-10 NOTE — PROGRESS NOTES
Elizabeth Neves is a 34y o  year old  at 38w0d for routine prenatal visit    + FM, no vaginal bleeding, contractions, or LOF  Complaints: Yes contractions  Most recent ultrasound and labs reviewed  Requested To be checked  SVE 2/70%/-1  GBS negative  Labor precautions reviewed

## 2018-10-11 ENCOUNTER — HOSPITAL ENCOUNTER (EMERGENCY)
Facility: HOSPITAL | Age: 33
Discharge: HOME | End: 2018-10-12
Attending: OBSTETRICS & GYNECOLOGY | Admitting: OBSTETRICS & GYNECOLOGY
Payer: COMMERCIAL
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
## 2018-10-11 DIAGNOSIS — S16.1XXA STRAIN OF NECK MUSCLE, INITIAL ENCOUNTER: ICD-10-CM

## 2018-10-11 DIAGNOSIS — Z34.93 THIRD TRIMESTER PREGNANCY: ICD-10-CM

## 2018-10-11 DIAGNOSIS — S30.1XXA CONTUSION OF ABDOMINAL WALL, INITIAL ENCOUNTER: ICD-10-CM

## 2018-10-11 PROCEDURE — 99285 EMERGENCY DEPT VISIT HI MDM: CPT

## 2018-10-11 RX ORDER — ACETAMINOPHEN 325 MG/1
650 TABLET ORAL ONCE
Status: COMPLETED | OUTPATIENT
Start: 2018-10-12 | End: 2018-10-11

## 2018-10-11 RX ADMIN — ACETAMINOPHEN 650 MG: 325 TABLET, FILM COATED ORAL at 23:56

## 2018-10-11 NOTE — LETTER
[address] AND DELIVERY  Trenton Schmid  Dept: [phone]    October 12, 2018     Patient: Iraida Cormier   YOB: 1985   Date of Visit: 10/11/2018       To Whom it May Concern:    Iraida Cormier is under my professional care  She was seen in the hospital from 10/11/2018   to 10/12/18  She may return to work at the next working day without any restrictions  If you have any questions or concerns, please don't hesitate to call           Sincerely,          Fidelia Goddard, DO

## 2018-10-12 VITALS
WEIGHT: 195 LBS | DIASTOLIC BLOOD PRESSURE: 60 MMHG | OXYGEN SATURATION: 99 % | SYSTOLIC BLOOD PRESSURE: 112 MMHG | HEART RATE: 88 BPM | BODY MASS INDEX: 35.67 KG/M2 | RESPIRATION RATE: 18 BRPM | TEMPERATURE: 97.7 F
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
## 2018-10-12 LAB
BACTERIA UR QL AUTO: ABNORMAL /HPF
BILIRUB UR QL STRIP: NEGATIVE
CLARITY UR: CLEAR
COLOR UR: YELLOW
GLUCOSE UR STRIP-MCNC: NEGATIVE MG/DL
HGB UR QL STRIP.AUTO: NEGATIVE
KETONES UR STRIP-MCNC: ABNORMAL MG/DL
LEUKOCYTE ESTERASE UR QL STRIP: NEGATIVE
NITRITE UR QL STRIP: NEGATIVE
NON-SQ EPI CELLS URNS QL MICRO: ABNORMAL /HPF
PH UR STRIP.AUTO: 7 [PH] (ref 4.5–8)
PROT UR STRIP-MCNC: ABNORMAL MG/DL
RBC #/AREA URNS AUTO: ABNORMAL /HPF
SP GR UR STRIP.AUTO: 1.01 (ref 1–1.03)
UROBILINOGEN UR QL STRIP.AUTO: 0.2 E.U./DL
WBC #/AREA URNS AUTO: ABNORMAL /HPF

## 2018-10-12 PROCEDURE — 99213 OFFICE O/P EST LOW 20 MIN: CPT | Performed by: OBSTETRICS & GYNECOLOGY

## 2018-10-12 PROCEDURE — 99212 OFFICE O/P EST SF 10 MIN: CPT

## 2018-10-12 PROCEDURE — 81001 URINALYSIS AUTO W/SCOPE: CPT

## 2018-10-12 PROCEDURE — 87086 URINE CULTURE/COLONY COUNT: CPT

## 2018-10-12 RX ORDER — LIDOCAINE 50 MG/G
1 PATCH TOPICAL ONCE
Status: DISCONTINUED | OUTPATIENT
Start: 2018-10-12 | End: 2018-10-12 | Stop reason: HOSPADM

## 2018-10-12 RX ADMIN — LIDOCAINE 1 PATCH: 50 PATCH TOPICAL at 00:06
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
## 2018-10-13 LAB — BACTERIA UR CULT: NORMAL

## 2018-10-16 ENCOUNTER — ROUTINE PRENATAL (OUTPATIENT)
Dept: OBGYN CLINIC | Facility: MEDICAL CENTER | Age: 33
End: 2018-10-16

## 2018-10-16 VITALS — WEIGHT: 193 LBS | BODY MASS INDEX: 35.3 KG/M2 | SYSTOLIC BLOOD PRESSURE: 126 MMHG | DIASTOLIC BLOOD PRESSURE: 70 MMHG

## 2018-10-16 DIAGNOSIS — Z34.03 ENCOUNTER FOR SUPERVISION OF NORMAL FIRST PREGNANCY IN THIRD TRIMESTER: Primary | ICD-10-CM

## 2018-10-16 PROCEDURE — PNV: Performed by: OBSTETRICS & GYNECOLOGY

## 2018-10-16 NOTE — PROGRESS NOTES
Viv Ford is a 34y o  year old  at 38w7d for routine prenatal visit    + FM, no vaginal bleeding, contractions, or LOF  Complaints: No   SVE 3/80/-1  Labor precautions reviewed

## 2018-10-22 ENCOUNTER — ROUTINE PRENATAL (OUTPATIENT)
Dept: OBGYN CLINIC | Facility: MEDICAL CENTER | Age: 33
End: 2018-10-22

## 2018-10-22 VITALS — WEIGHT: 196.5 LBS | BODY MASS INDEX: 35.94 KG/M2 | DIASTOLIC BLOOD PRESSURE: 78 MMHG | SYSTOLIC BLOOD PRESSURE: 120 MMHG

## 2018-10-22 DIAGNOSIS — Z3A.39 39 WEEKS GESTATION OF PREGNANCY: ICD-10-CM

## 2018-10-22 DIAGNOSIS — Z34.93 THIRD TRIMESTER PREGNANCY: Primary | ICD-10-CM

## 2018-10-22 PROCEDURE — PNV: Performed by: OBSTETRICS & GYNECOLOGY

## 2018-10-22 NOTE — PROGRESS NOTES
Lora Lockett is a 34y o  year old  at 39w5d for routine prenatal visit    + FM, no vaginal bleeding, contractions, or LOF  Complaints: Yes contraction and pelvic pressure  Most recent ultrasound and labs reviewed  GBS negative  SVE 3/80/2  Prefers to wait to schedule the IOL  Will come this Friday if not delivered before to schedule the IOL for next week    Labor precautions reviewed

## 2018-10-25 ENCOUNTER — ROUTINE PRENATAL (OUTPATIENT)
Dept: OBGYN CLINIC | Facility: MEDICAL CENTER | Age: 33
End: 2018-10-25

## 2018-10-25 VITALS — DIASTOLIC BLOOD PRESSURE: 74 MMHG | WEIGHT: 198 LBS | BODY MASS INDEX: 36.21 KG/M2 | SYSTOLIC BLOOD PRESSURE: 118 MMHG

## 2018-10-25 DIAGNOSIS — Z34.03 ENCOUNTER FOR SUPERVISION OF NORMAL FIRST PREGNANCY IN THIRD TRIMESTER: Primary | ICD-10-CM

## 2018-10-25 DIAGNOSIS — Z3A.40 40 WEEKS GESTATION OF PREGNANCY: ICD-10-CM

## 2018-10-25 PROCEDURE — PNV: Performed by: OBSTETRICS & GYNECOLOGY

## 2018-10-25 NOTE — PROGRESS NOTES
Marycarmen Rivas is a 34y o  year old  at 40w1d for routine prenatal visit    + FM, no vaginal bleeding, contractions, or LOF  Complaints: No   Most recent ultrasound and labs reviewed    Cvx: 3/90/0  IOL scheduled 10/29/18

## 2018-10-29 ENCOUNTER — HOSPITAL ENCOUNTER (OUTPATIENT)
Dept: LABOR AND DELIVERY | Facility: HOSPITAL | Age: 33
Discharge: HOME | End: 2018-10-29
Payer: COMMERCIAL

## 2018-10-29 ENCOUNTER — HOSPITAL ENCOUNTER (INPATIENT)
Facility: HOSPITAL | Age: 33
LOS: 2 days | Discharge: HOME | End: 2018-10-31
Attending: OBSTETRICS & GYNECOLOGY | Admitting: OBSTETRICS & GYNECOLOGY
Payer: COMMERCIAL

## 2018-10-29 ENCOUNTER — ANESTHESIA EVENT (INPATIENT)
Dept: LABOR AND DELIVERY | Facility: HOSPITAL | Age: 33
End: 2018-10-29
Payer: COMMERCIAL

## 2018-10-29 ENCOUNTER — ANESTHESIA (INPATIENT)
Dept: LABOR AND DELIVERY | Facility: HOSPITAL | Age: 33
End: 2018-10-29
Payer: COMMERCIAL

## 2018-10-29 DIAGNOSIS — Z3A.40 40 WEEKS GESTATION OF PREGNANCY: Primary | ICD-10-CM

## 2018-10-29 LAB
ABO GROUP BLD: NORMAL
BASE EXCESS BLDCOA CALC-SCNC: -8.1 MMOL/L (ref 3–11)
BASE EXCESS BLDCOV CALC-SCNC: -8.4 MMOL/L (ref 1–9)
BLD GP AB SCN SERPL QL: NEGATIVE
ERYTHROCYTE [DISTWIDTH] IN BLOOD BY AUTOMATED COUNT: 14.1 % (ref 11.6–15.1)
HCO3 BLDCOA-SCNC: 21.1 MMOL/L (ref 17.3–27.3)
HCO3 BLDCOV-SCNC: 17.5 MMOL/L (ref 12.2–28.6)
HCT VFR BLD AUTO: 33.3 % (ref 34.8–46.1)
HGB BLD-MCNC: 11.3 G/DL (ref 11.5–15.4)
MCH RBC QN AUTO: 27.1 PG (ref 26.8–34.3)
MCHC RBC AUTO-ENTMCNC: 33.9 G/DL (ref 31.4–37.4)
MCV RBC AUTO: 80 FL (ref 82–98)
O2 CT VFR BLDCOA CALC: 8.4 ML/DL
OXYHGB MFR BLDCOA: 34.8 %
OXYHGB MFR BLDCOV: 50 %
PCO2 BLDCOA: 57.8 MM[HG] (ref 30–60)
PCO2 BLDCOV: 37.7 MM HG (ref 27–43)
PH BLDCOA: 7.18 [PH] (ref 7.23–7.43)
PH BLDCOV: 7.29 [PH] (ref 7.19–7.49)
PLATELET # BLD AUTO: 313 THOUSANDS/UL (ref 149–390)
PMV BLD AUTO: 9.9 FL (ref 8.9–12.7)
PO2 BLDCOA: 20.8 MM HG (ref 5–25)
PO2 BLDCOV: 23.4 MM HG (ref 15–45)
RBC # BLD AUTO: 4.17 MILLION/UL (ref 3.81–5.12)
RH BLD: POSITIVE
SAO2 % BLDCOV: 11.8 ML/DL
SPECIMEN EXPIRATION DATE: NORMAL
WBC # BLD AUTO: 12.62 THOUSAND/UL (ref 4.31–10.16)

## 2018-10-29 PROCEDURE — 3E033VJ INTRODUCTION OF OTHER HORMONE INTO PERIPHERAL VEIN, PERCUTANEOUS APPROACH: ICD-10-PCS | Performed by: OBSTETRICS & GYNECOLOGY

## 2018-10-29 PROCEDURE — 86592 SYPHILIS TEST NON-TREP QUAL: CPT | Performed by: STUDENT IN AN ORGANIZED HEALTH CARE EDUCATION/TRAINING PROGRAM

## 2018-10-29 PROCEDURE — 86850 RBC ANTIBODY SCREEN: CPT | Performed by: STUDENT IN AN ORGANIZED HEALTH CARE EDUCATION/TRAINING PROGRAM

## 2018-10-29 PROCEDURE — 0KQM0ZZ REPAIR PERINEUM MUSCLE, OPEN APPROACH: ICD-10-PCS | Performed by: OBSTETRICS & GYNECOLOGY

## 2018-10-29 PROCEDURE — 10907ZC DRAINAGE OF AMNIOTIC FLUID, THERAPEUTIC FROM PRODUCTS OF CONCEPTION, VIA NATURAL OR ARTIFICIAL OPENING: ICD-10-PCS | Performed by: OBSTETRICS & GYNECOLOGY

## 2018-10-29 PROCEDURE — 59400 OBSTETRICAL CARE: CPT | Performed by: OBSTETRICS & GYNECOLOGY

## 2018-10-29 PROCEDURE — 86900 BLOOD TYPING SEROLOGIC ABO: CPT | Performed by: STUDENT IN AN ORGANIZED HEALTH CARE EDUCATION/TRAINING PROGRAM

## 2018-10-29 PROCEDURE — 85027 COMPLETE CBC AUTOMATED: CPT | Performed by: STUDENT IN AN ORGANIZED HEALTH CARE EDUCATION/TRAINING PROGRAM

## 2018-10-29 PROCEDURE — 99212 OFFICE O/P EST SF 10 MIN: CPT

## 2018-10-29 PROCEDURE — 82805 BLOOD GASES W/O2 SATURATION: CPT | Performed by: OBSTETRICS & GYNECOLOGY

## 2018-10-29 PROCEDURE — 4A1HXCZ MONITORING OF PRODUCTS OF CONCEPTION, CARDIAC RATE, EXTERNAL APPROACH: ICD-10-PCS | Performed by: OBSTETRICS & GYNECOLOGY

## 2018-10-29 PROCEDURE — 86901 BLOOD TYPING SEROLOGIC RH(D): CPT | Performed by: STUDENT IN AN ORGANIZED HEALTH CARE EDUCATION/TRAINING PROGRAM

## 2018-10-29 RX ORDER — ACETAMINOPHEN 325 MG/1
650 TABLET ORAL EVERY 6 HOURS PRN
Status: DISCONTINUED | OUTPATIENT
Start: 2018-10-29 | End: 2018-10-31 | Stop reason: HOSPADM

## 2018-10-29 RX ORDER — ROPIVACAINE HYDROCHLORIDE 2 MG/ML
INJECTION, SOLUTION EPIDURAL; INFILTRATION; PERINEURAL
Status: COMPLETED
Start: 2018-10-29 | End: 2018-10-29

## 2018-10-29 RX ORDER — CALCIUM CARBONATE 200(500)MG
1000 TABLET,CHEWABLE ORAL DAILY PRN
Status: DISCONTINUED | OUTPATIENT
Start: 2018-10-29 | End: 2018-10-31 | Stop reason: HOSPADM

## 2018-10-29 RX ORDER — DIAPER,BRIEF,INFANT-TODD,DISP
1 EACH MISCELLANEOUS AS NEEDED
Status: DISCONTINUED | OUTPATIENT
Start: 2018-10-29 | End: 2018-10-31 | Stop reason: HOSPADM

## 2018-10-29 RX ORDER — BUPIVACAINE HYDROCHLORIDE 2.5 MG/ML
INJECTION, SOLUTION EPIDURAL; INFILTRATION; INTRACAUDAL
Status: COMPLETED
Start: 2018-10-29 | End: 2018-10-29

## 2018-10-29 RX ORDER — OXYTOCIN/RINGER'S LACTATE 30/500 ML
250 PLASTIC BAG, INJECTION (ML) INTRAVENOUS CONTINUOUS
Status: DISCONTINUED | OUTPATIENT
Start: 2018-10-29 | End: 2018-10-31 | Stop reason: HOSPADM

## 2018-10-29 RX ORDER — IBUPROFEN 600 MG/1
600 TABLET ORAL EVERY 6 HOURS PRN
Status: DISCONTINUED | OUTPATIENT
Start: 2018-10-29 | End: 2018-10-31 | Stop reason: HOSPADM

## 2018-10-29 RX ORDER — OXYTOCIN/RINGER'S LACTATE 30/500 ML
1-30 PLASTIC BAG, INJECTION (ML) INTRAVENOUS
Status: DISCONTINUED | OUTPATIENT
Start: 2018-10-29 | End: 2018-10-29

## 2018-10-29 RX ORDER — ONDANSETRON 2 MG/ML
4 INJECTION INTRAMUSCULAR; INTRAVENOUS EVERY 8 HOURS PRN
Status: DISCONTINUED | OUTPATIENT
Start: 2018-10-29 | End: 2018-10-31 | Stop reason: HOSPADM

## 2018-10-29 RX ORDER — DIPHENHYDRAMINE HCL 25 MG
25 TABLET ORAL EVERY 6 HOURS PRN
Status: DISCONTINUED | OUTPATIENT
Start: 2018-10-29 | End: 2018-10-31 | Stop reason: HOSPADM

## 2018-10-29 RX ORDER — ONDANSETRON 2 MG/ML
4 INJECTION INTRAMUSCULAR; INTRAVENOUS EVERY 6 HOURS PRN
Status: DISCONTINUED | OUTPATIENT
Start: 2018-10-29 | End: 2018-10-29

## 2018-10-29 RX ORDER — OXYCODONE HYDROCHLORIDE 5 MG/1
5 TABLET ORAL EVERY 4 HOURS PRN
Status: DISCONTINUED | OUTPATIENT
Start: 2018-10-29 | End: 2018-10-31 | Stop reason: HOSPADM

## 2018-10-29 RX ORDER — SODIUM CHLORIDE, SODIUM LACTATE, POTASSIUM CHLORIDE, CALCIUM CHLORIDE 600; 310; 30; 20 MG/100ML; MG/100ML; MG/100ML; MG/100ML
125 INJECTION, SOLUTION INTRAVENOUS CONTINUOUS
Status: DISCONTINUED | OUTPATIENT
Start: 2018-10-29 | End: 2018-10-29

## 2018-10-29 RX ORDER — ROPIVACAINE HYDROCHLORIDE 2 MG/ML
INJECTION, SOLUTION EPIDURAL; INFILTRATION; PERINEURAL AS NEEDED
Status: DISCONTINUED | OUTPATIENT
Start: 2018-10-29 | End: 2018-10-29 | Stop reason: SURG

## 2018-10-29 RX ORDER — ACETAMINOPHEN 325 MG/1
975 TABLET ORAL EVERY 8 HOURS PRN
Status: DISCONTINUED | OUTPATIENT
Start: 2018-10-29 | End: 2018-10-31 | Stop reason: HOSPADM

## 2018-10-29 RX ORDER — DOCUSATE SODIUM 100 MG/1
100 CAPSULE, LIQUID FILLED ORAL 2 TIMES DAILY
Status: DISCONTINUED | OUTPATIENT
Start: 2018-10-29 | End: 2018-10-31 | Stop reason: HOSPADM

## 2018-10-29 RX ADMIN — ROPIVACAINE HYDROCHLORIDE 4 ML: 2 INJECTION, SOLUTION EPIDURAL; INFILTRATION at 10:43

## 2018-10-29 RX ADMIN — ROPIVACAINE HYDROCHLORIDE 4 ML: 2 INJECTION, SOLUTION EPIDURAL; INFILTRATION at 10:39

## 2018-10-29 RX ADMIN — DOCUSATE SODIUM 100 MG: 100 CAPSULE, LIQUID FILLED ORAL at 19:34

## 2018-10-29 RX ADMIN — BENZOCAINE AND LEVOMENTHOL: 200; 5 SPRAY TOPICAL at 19:35

## 2018-10-29 RX ADMIN — BUPIVACAINE HYDROCHLORIDE 30 ML: 2.5 INJECTION, SOLUTION EPIDURAL; INFILTRATION; INTRACAUDAL; PERINEURAL at 18:30

## 2018-10-29 RX ADMIN — ROPIVACAINE HYDROCHLORIDE: 2 INJECTION, SOLUTION EPIDURAL; INFILTRATION at 10:49

## 2018-10-29 RX ADMIN — SODIUM CHLORIDE, SODIUM LACTATE, POTASSIUM CHLORIDE, AND CALCIUM CHLORIDE 125 ML/HR: .6; .31; .03; .02 INJECTION, SOLUTION INTRAVENOUS at 14:57

## 2018-10-29 RX ADMIN — SODIUM CHLORIDE, SODIUM LACTATE, POTASSIUM CHLORIDE, AND CALCIUM CHLORIDE 125 ML/HR: .6; .31; .03; .02 INJECTION, SOLUTION INTRAVENOUS at 07:55

## 2018-10-29 RX ADMIN — Medication 2 MILLI-UNITS/MIN: at 07:55

## 2018-10-29 RX ADMIN — SODIUM CHLORIDE, SODIUM LACTATE, POTASSIUM CHLORIDE, AND CALCIUM CHLORIDE 999 ML/HR: .6; .31; .03; .02 INJECTION, SOLUTION INTRAVENOUS at 10:07

## 2018-10-29 RX ADMIN — HYDROCORTISONE 1 APPLICATION: 1 CREAM TOPICAL at 19:35

## 2018-10-29 RX ADMIN — IBUPROFEN 600 MG: 600 TABLET, FILM COATED ORAL at 19:34

## 2018-10-29 RX ADMIN — WITCH HAZEL 1 PAD: 500 SOLUTION RECTAL; TOPICAL at 19:35

## 2018-10-29 NOTE — OB LABOR/OXYTOCIN SAFETY PROGRESS
Oxytocin Safety Progress Check Note - Judy Zapata 34 y o  female MRN: [id number]    Unit/Bed#: L&D 325-01 Encounter: [id number]    Obstetric History       T0      L0     SAB0   TAB0   Ectopic0   Multiple0   Live Births0      Gestational Age: 39w6d  Dose (flaquito-units/min) Oxytocin: 8 flaquito-units/min  Contraction Frequency (minutes): 2-2 5  Contraction Quality: Moderate  Tachysystole: No   Dilation: 9        Effacement (%): 100  Station: 0  Baseline Rate: 150 bpm     FHR Category: Category I   Early decelerations  Continue current management          Notes/comments:             Roman Miller MD 10/29/2018 1:32 PM

## 2018-10-29 NOTE — H&P
H&P Exam - Obstetrics   Cordell Eisenberg 34 y o  female MRN: [id number]  Unit/Bed#: L&D 325-01 Encounter: [id number]    >2 Midnights    INPATIENT     Pt is under the care of [place]    History of Present Illness   Chief Complaint: Contractions  "I'm scheduled for induction at 0800"    HPI:  Cordell Eisenberg is a 34 y o   female with an MARY of 10/24/2018, by Last Menstrual Period at 40w5d weeks gestation who is being admitted for contractions and elective induction of labor  Pt states her contractions became closer and stronger around 0255, approximately every 2-3 minutes  Leakage of fluid: None  Bleeding: one episode of spotting this AM   Fetal movement: present  Her current obstetrical history is significant for obesity  Review of Systems   Constitutional: Negative for fever  Respiratory: Negative for shortness of breath  Cardiovascular: Negative for chest pain  Gastrointestinal: Negative for abdominal pain (ctx), nausea and vomiting  Genitourinary: Positive for vaginal bleeding (episode this AM of spotting)         Historical Information   OB History    Para Term  AB Living   1 0 0 0 0 0   SAB TAB Ectopic Multiple Live Births                  # Outcome Date GA Lbr [place]/2nd Weight Sex Delivery Anes PTL Lv   1 Current                 Baby complications/comments: VTX, EFW 7lbs  Past Medical History:   Diagnosis Date    Abnormal Pap smear of cervix     Disease of thyroid gland     h/o thyroid nodules    HPV (human papilloma virus) infection     Migraine     Urinary tract infection     utix1 in past    Varicella     positive history     Past Surgical History:   Procedure Laterality Date    TONSILLECTOMY      WISDOM TOOTH EXTRACTION       Social History   History   Alcohol Use No     History   Drug Use No     History   Smoking Status    Former Smoker    Quit date:    Smokeless Tobacco    Never Used     Comment: Quit with knowledge of pregnancy     Family History: non-contributory    Meds/Allergies   {  Prescriptions Prior to Admission   Medication    Prenatal Vit-Fe Fumarate-FA (PRENATAL MULTIVITAMIN) 28-0 8 MG TABS     No Known Allergies    Objective   Vitals: Blood pressure 119/65, pulse 75, temperature 98 2 °F (36 8 °C), temperature source Oral, resp  rate 18, height 5' 2" (1 575 m), weight 86 2 kg (190 lb), last menstrual period 01/17/2018, currently breastfeeding  Body mass index is 34 75 kg/m²  Invasive Devices          No matching active lines, drains, or airways          Physical Exam   Constitutional: She is oriented to person, place, and time  She appears well-developed and well-nourished  No distress  HENT:   Head: Normocephalic and atraumatic  Cardiovascular: Normal rate, regular rhythm and normal heart sounds  Exam reveals no gallop and no friction rub  No murmur heard  Pulmonary/Chest: Effort normal and breath sounds normal  No respiratory distress  She has no wheezes  She has no rales  Abdominal: Soft  Bowel sounds are normal    Gravid uterus   Genitourinary: Vagina normal    Musculoskeletal: Normal range of motion  She exhibits no edema or tenderness  Neurological: She is alert and oriented to person, place, and time  Skin: She is not diaphoretic  Psychiatric: She has a normal mood and affect  Her behavior is normal  Judgment and thought content normal    Vitals reviewed  Vaginal Exam  Dilation: 3  Effacement (%): 90  Station: 0  Presentation: Vertex  Position: Unknown  Method: Manual  OB Examiner: Morteza Bustillos  Membranes: Intact  FHR: Baseline: 120 bpm, Variability: Moderate 6 - 25 bpm, Accelerations: Reactive, Decelerations: Absent and Category 1    [place]: Frequency: Every 2-3 minutes    Prenatal Labs: I have personally reviewed pertinent reports    , Blood Type:   Lab Results   Component Value Date/Time    ABO Grouping A 03/26/2018 04:06 PM     , D (Rh type):   Lab Results   Component Value Date/Time    Rh Factor Positive 03/26/2018 04:06 PM     , Antibody Screen:   Lab Results   Component Value Date/Time    Antibody Screen Negative 03/26/2018 04:06 PM    , 1 hour Glucola:   Lab Results   Component Value Date/Time    Glucose 137 08/01/2018 09:26 AM   , 3 hour GTT:   Lab Results   Component Value Date/Time    Glucose, GTT - 3 Hour 99 08/10/2018   , Varicella: Positive history    , Rubella:   Lab Results   Component Value Date/Time    Rubella IgG Quant >175 0 03/26/2018 04:06 PM        , VDRL/RPR:   Lab Results   Component Value Date/Time    RPR Non-Reactive 03/26/2018 04:06 PM      , Hep B:   Lab Results   Component Value Date/Time    Hepatitis B Surface Ag Non-reactive 03/26/2018 04:06 PM     , HIV:   Lab Results   Component Value Date/Time    HIV-1/HIV-2 Ab Non-Reactive 03/26/2018 04:06 PM     , Chlamydia: negative  , Gonorrhea: negative  , Group B Strep:    Lab Results   Component Value Date/Time    Strep Grp B DEN Negative 09/26/2018 11:26 AM          Imaging, EKG, Pathology, and Other Studies: I have personally reviewed pertinent reports        Assessment/Plan     Assessment:  IUP at 40w5d here for elective IOL  Plan:  1) Admit to L&D  2) Routine Labs: CBC, T&S, RPR  3) F/E/N: Clear Liquid diet, IV LR at 125mL/hr  4) Pain: Analgesia and/or epidural upon request  5) Induction with Pitocin/AROM    D/w Dr Natalie Howell MD  OBGYN, PGY-2  10/29/2018 6:33 AM

## 2018-10-29 NOTE — L&D DELIVERY NOTE
Delivery Summary - OB/GYN   Kevin Olmedo 34 y o  female MRN: [id number]  Unit/Bed#: L&D 325-01 Encounter: [id number]    Pre-delivery Diagnosis:   1  40w5d pregnancy  2  Induction of labor  3  Meconium stained fluid    Post-delivery Diagnosis: same, delivered    Attending: Bernard    Assistant(s): ALLYSON Uribe    Procedure: , repair of second degree laceration     Anesthesia: epidural    Estimated Blood Loss:  200 mL    Specimens:   1  Arterial and venous cord gases  2  Cord blood  3  Segment of umbilical cord  4  Placenta to storage     Complications:  None apparent    Findings:  1  Viable male  delivered at 26 weight pending;  Apgar scores of 8 at one minute and 9 at five minutes  2  Spontaneous delivery of placenta with centrally inserted 3-vessel cord  3  Meconium stained fluid  4  2nd degree perineal laceration, repaired with 3-0Vicryl  5  Umbilical Cord Gases     pH Base Excess   Arterial   Results from last 7 days  Lab Units 10/29/18  1822   PH COA  7 181*       Results from last 7 days  Lab Units 10/29/18  1822   BASE EXC COA mmol/L -8 1*      Venous   Results from last 7 days  Lab Units 10/29/18  1822   PH COV  7 285       Results from last 7 days  Lab Units 10/29/18  1822   BASE EXC COV mmol/L -8 4*             Disposition: Patient tolerated the procedure well and was recovering in labor and delivery room with family and  before being transferred to the post-partum floor  Procedure Details     Description of procedure    After pushing for 85 minutes, at 26 patient delivered a viable male , weight pending, Apgars of 8 (1 min) and 9 (5 min)  The fetal vertex delivered spontaneously  There was no nuchal cord  The anterior shoulder delivered atraumatically with maternal expulsive forces and the assistance of downward traction  The posterior shoulder delivered with maternal expulsive forces and the assistance of upward traction   The remainder of the fetus delivered spontaneously  Upon delivery, the infant was placed on the mothers abdomen and the cord was clamped and cut  The infant was noted to cry spontaneously and was moving all extremities appropriately  There was no evidence for injury  Awaiting nurse resuscitators and Neonatologist (due to meconium) evaluated the  at bedside  Arterial and venous cord blood gases and cord blood was collected for analysis  These were promptly sent to the lab  In the immediate post-partum, 30 units of IV pitocin was administered and the uterus was noted to contract down well with massage and pitocin  The placenta delivered spontaneously at 1825 and was noted to have a centrally inserted 3 vessel cord  The vagina, cervix, and perineum were inspected and there was noted to be a second degree laceration  Laceration Repair  Patient was comfortable with epidural at that time  A second degree was identified and required repair  Laceration was repaired with 3-0 Vicryl in usual fashion to reapproximate the laceration  Good hemostasis was confirmed at the conclusion of this procedure  At the conclusion of the delivery, all needle, sponge, and instrument counts were noted to be correct  Patient tolerated the procedure well and was allowed to recover in labor and delivery room with family and  before being transferred to the post-partum floor

## 2018-10-29 NOTE — OB LABOR/OXYTOCIN SAFETY PROGRESS
Oxytocin Safety Progress Check Note - Susanne Calvin 34 y o  female MRN: [id number]    Unit/Bed#: L&D 325-01 Encounter: [id number]    Obstetric History       T0      L0     SAB0   TAB0   Ectopic0   Multiple0   Live Births0      Gestational Age: 39w6d  Dose (flaquito-units/min) Oxytocin: 6 flaquito-units/min  Contraction Frequency (minutes): 2-3  Contraction Quality: Moderate  Tachysystole: No   Dilation: 4-5        Effacement (%): 100  Station: -1  Baseline Rate: 135 bpm     FHR Category: Category I          Notes/comments:      Pt very uncomfortable, lots of back pain  Has made some change, clearly uncomfortable and would like epidural now  Will update Dr Sharonda Lane accordingly         Dimas Garcia DO 10/29/2018 10:25 AM

## 2018-10-29 NOTE — DISCHARGE SUMMARY
Discharge Summary - Cordell Eisenberg 34 y o  female MRN: [id number]    Unit/Bed#: L&D 325-01 Encounter: [id number]    Admission Date: 10/29/2018     Discharge Date: 10/31/18    Admitting Diagnosis:   1  Pregnancy at 40w5d  2  Obesity  3  Elective induction of labor    Discharge Diagnosis: same, delivered    Procedures: spontaneous vaginal delivery   Repair of second degree laceration    Admitting and Delivering Attending: Alfredo Iyer MD   Discharging Attending: Dr Sissy Rogers Course:     Cordell Eisenberg is a 34 y o   at 40w5d wks who was initially admitted for elective induction  She was started on pitocin titration for induction  She received an epidural for anesthesia  Membranes were artificially ruptured at 1109 and notable for meconium-stained fluid  She delivered a viable male  on 10/29/18 at 1821  Weight 8lbs 3oz via spontaneous vaginal delivery  Apgars were 8 (1 min) and 9 (5 min)  Patient tolerated the procedure well and was transferred to recovery in stable condition  Her postpartum course was uncomplicated  Pre-delivery hemoglobin was 11 3  Her postpartum pain was well controlled with oral analgesics  On day of discharge, she was ambulating and able to reasonably perform all ADLs  She was voiding and had appropriate bowel function  Pain was well controlled  She was discharged home on postpartum day #2 without complications  Patient was instructed to follow up with her OB as an outpatient and was given appropriate warnings to call provider if she develops signs of infection or uncontrolled pain  Complications: none apparent    Condition at discharge: good      Discharge Medications:   Prenatal vitamin daily for 6 months or the duration of nursing whichever is longer    Motrin 600 mg orally every 6 hours as needed for pain  Tylenol (over the counter) per bottle directions as needed for pain  Hydrocortisone cream 1% (over the counter) applied 1-2x daily to hemorrhoids as needed  Witch hazel pads for hemorrhoidal discomfort as needed    Discharge instructions: See after visit summary for complete information  Do not place anything (no partner, tampons or douche) in your vagina for 6 weeks  You may walk for exercise for the first 6 weeks then gradually return to your usual activities     Please do not drive for 1 week if you have no stitches and for 2 weeks if you have stitches or underwent a  delivery     You may take baths or shower per your preference     Please look at your bust (breasts) in the mirror daily and call for redness or tenderness or increased warmth     If you have had a  please look at your incision daily as well and call us for increasing redness or steady drainage from the incision     Please call us for temperature > 100 4*F or 38* C, worsening pain or a foul discharge      Disposition: Home    Planned Readmission: No    Dallas Cisse MD  OBGYN, PGY-2  10/31/2018 6:23 AM

## 2018-10-29 NOTE — ANESTHESIA PROCEDURE NOTES
Epidural Block    Patient location during procedure: OB  Start time: 10/29/2018 10:17 AM  Reason for block: procedure for pain  Staffing  Anesthesiologist: Alexus Marie  Performed: anesthesiologist   Preanesthetic Checklist  Completed: patient identified, site marked, surgical consent, pre-op evaluation, timeout performed, IV checked, risks and benefits discussed and monitors and equipment checked  Epidural  Patient position: sitting  Prep: Betadine  Patient monitoring: frequent blood pressure checks  Approach: midline  Location: lumbar (1-5)  Injection technique: ALEJANDRO air  Needle  Needle type: Tuohy   Needle gauge: 18 G  Catheter type: side hole  Catheter size: 20 G  Catheter at skin depth: 10 cm  Test dose: negativenegative aspiration for CSF, negative aspiration for heme and no paresthesia on injection  patient tolerated the procedure well with no immediate complications

## 2018-10-29 NOTE — PLAN OF CARE
Problem: PAIN - ADULT  Goal: Verbalizes/displays adequate comfort level or baseline comfort level  Interventions:  - Encourage patient to monitor pain and request assistance  - Assess pain using appropriate pain scale  - Administer analgesics based on type and severity of pain and evaluate response  - Implement non-pharmacological measures as appropriate and evaluate response  - Consider cultural and social influences on pain and pain management  - Notify physician/advanced practitioner if interventions unsuccessful or patient reports new pain  Outcome: Progressing      Problem: INFECTION - ADULT  Goal: Absence or prevention of progression during hospitalization  INTERVENTIONS:  - Assess and monitor for signs and symptoms of infection  - Monitor lab/diagnostic results  - Monitor all insertion sites, i e  indwelling lines, tubes, and drains  - Monitor endotracheal (as able) and nasal secretions for changes in amount and color  - [place] appropriate cooling/warming therapies per order  - Administer medications as ordered  - Instruct and encourage patient and family to use good hand hygiene technique  - Identify and instruct in appropriate isolation precautions for identified infection/condition  Outcome: Progressing    Goal: Absence of fever/infection during neutropenic period  INTERVENTIONS:  - Monitor WBC  - Implement neutropenic guidelines  Outcome: Progressing      Problem: SAFETY ADULT  Goal: Patient will remain free of falls  INTERVENTIONS:  - Assess patient frequently for physical needs  -  Identify cognitive and physical deficits and behaviors that affect risk of falls    -  [place] fall precautions as indicated by assessment   - Educate patient/family on patient safety including physical limitations  - Instruct patient to call for assistance with activity based on assessment  - Modify environment to reduce risk of injury  - Consider OT/PT consult to assist with strengthening/mobility  Outcome: Progressing    Goal: Maintain or return to baseline ADL function  INTERVENTIONS:  -  Assess patient's ability to carry out ADLs; assess patient's baseline for ADL function and identify physical deficits which impact ability to perform ADLs (bathing, care of mouth/teeth, toileting, grooming, dressing, etc )  - Assess/evaluate cause of self-care deficits   - Assess range of motion  - Assess patient's mobility; develop plan if impaired  - Assess patient's need for assistive devices and provide as appropriate  - Encourage maximum independence but intervene and supervise when necessary  ¯ Involve family in performance of ADLs  ¯ Assess for home care needs following discharge   ¯ Request OT consult to assist with ADL evaluation and planning for discharge  ¯ Provide patient education as appropriate  Outcome: Progressing    Goal: Maintain or return mobility status to optimal level  INTERVENTIONS:  - Assess patient's baseline mobility status (ambulation, transfers, stairs, etc )    - Identify cognitive and physical deficits and behaviors that affect mobility  - Identify mobility aids required to assist with transfers and/or ambulation (gait belt, sit-to-stand, lift, walker, cane, etc )  - [place] fall precautions as indicated by assessment  - Record patient progress and toleration of activity level on Mobility SBAR; progress patient to next Phase/Stage  - Instruct patient to call for assistance with activity based on assessment  - Request Rehabilitation consult to assist with strengthening/weightbearing, etc   Outcome: Progressing      Problem: Knowledge Deficit  Goal: Patient/family/caregiver demonstrates understanding of disease process, treatment plan, medications, and discharge instructions  Complete learning assessment and assess knowledge base    Interventions:  - Provide teaching at level of understanding  - Provide teaching via preferred learning methods  Outcome: Progressing      Problem: DISCHARGE PLANNING  Goal: Discharge to home or other facility with appropriate resources  INTERVENTIONS:  - Identify barriers to discharge w/patient and caregiver  - Arrange for needed discharge resources and transportation as appropriate  - Identify discharge learning needs (meds, wound care, etc )  - Arrange for interpretive services to assist at discharge as needed  - Refer to Case Management Department for coordinating discharge planning if the patient needs post-hospital services based on physician/advanced practitioner order or complex needs related to functional status, cognitive ability, or social support system  Outcome: Progressing

## 2018-10-29 NOTE — OB LABOR/OXYTOCIN SAFETY PROGRESS
Oxytocin Safety Progress Check Note - Kevin Olmedo 34 y o  female MRN: [id number]    Unit/Bed#: L&D 325-01 Encounter: [id number]    Obstetric History       T0      L0     SAB0   TAB0   Ectopic0   Multiple0   Live Births0      Gestational Age: 39w6d  Dose (flaquito-units/min) Oxytocin: 6 flaquito-units/min  Contraction Frequency (minutes): 3-5  Contraction Quality: Mild  Tachysystole: No   Dilation: 4        Effacement (%): 90  Station: -1  Baseline Rate: 135 bpm     FHR Category: Category I          Notes/comments:     Patient is feeling anxious about labor  She is becoming more uncomfortable, but is not yet ready for her epidural    Pitocin titration: 6mL/hour  SVE: /-1  FHT: Category 1: 140/moderate/accelerations, no decelerations   [place]: q3-5 minutes  Plan: Continue pitocin titration and consider AROM with next check               Berkley Merrill MD 10/29/2018 9:23 AM

## 2018-10-29 NOTE — OB LABOR/OXYTOCIN SAFETY PROGRESS
Oxytocin Safety Progress Check Note - Salima Smith 34 y o  female MRN: [id number]    Unit/Bed#: L&D 325-01 Encounter: [id number]    Obstetric History       T0      L0     SAB0   TAB0   Ectopic0   Multiple0   Live Births0      Gestational Age: 39w6d  Dose (flaquito-units/min) Oxytocin: 8 flaquito-units/min  Contraction Frequency (minutes): 2  Contraction Quality: Moderate  Tachysystole: No   Dilation: Lip/rim (Comment)        Effacement (%): 100  Station: 1  Baseline Rate: 140 bpm     FHR Category: Category I          Notes/comments:             Carley Avina MD 10/29/2018 2:54 PM

## 2018-10-29 NOTE — OB LABOR/OXYTOCIN SAFETY PROGRESS
Oxytocin Safety Progress Check Note - Hira Colunga 34 y o  female MRN: [id number]    Unit/Bed#: L&D 325-01 Encounter: [id number]    Obstetric History       T0      L0     SAB0   TAB0   Ectopic0   Multiple0   Live Births0      Gestational Age: 39w6d  Dose (flaquito-units/min) Oxytocin: 8 flaquito-units/min  Contraction Frequency (minutes): 1-3  Contraction Quality: Strong  Tachysystole: No   Dilation: Lip/rim (Comment)        Effacement (%): 100  Station: 1  Baseline Rate: 140 bpm     FHR Category: Category I          Notes/comments:   Patient is reporting increased pressure during contractions  Anterior lip of cervix still present  Fetal station still +1  Will reassess when patient is having constant pressure    Will update Dr Natalie Elena MD 10/29/2018 4:09 PM

## 2018-10-29 NOTE — OB LABOR/OXYTOCIN SAFETY PROGRESS
Oxytocin Safety Progress Check Note - Joann Whitlock 34 y o  female MRN: [id number]    Unit/Bed#: L&D 325-01 Encounter: [id number]    Obstetric History       T0      L0     SAB0   TAB0   Ectopic0   Multiple0   Live Births0      Gestational Age: 39w6d  Dose (flaquito-units/min) Oxytocin: 6 flaquito-units/min  Contraction Frequency (minutes): 2-3  Contraction Quality: Moderate  Tachysystole: No   Dilation: 6        Effacement (%): 100  Station: -1  Baseline Rate: 140 bpm     FHR Category: Category I          Notes/comments:      Ruptured for meconium, has made change, Early decelerations visible         Ria Nelson MD 10/29/2018 11:13 AM
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
## 2018-10-30 LAB — RPR SER QL: NORMAL

## 2018-10-30 RX ADMIN — Medication 1 TABLET: at 08:12

## 2018-10-30 RX ADMIN — IBUPROFEN 600 MG: 600 TABLET, FILM COATED ORAL at 08:12

## 2018-10-30 RX ADMIN — DOCUSATE SODIUM 100 MG: 100 CAPSULE, LIQUID FILLED ORAL at 08:12

## 2018-10-30 RX ADMIN — DOCUSATE SODIUM 100 MG: 100 CAPSULE, LIQUID FILLED ORAL at 18:03

## 2018-10-30 NOTE — PLAN OF CARE
Knowledge Deficit     Verbalizes understanding of labor plan Completed        Labor & Delivery     Manages discomfort Completed     Patient vital signs are stable Completed          DISCHARGE PLANNING     Discharge to home or other facility with appropriate resources Progressing        INFECTION - ADULT     Absence or prevention of progression during hospitalization Progressing     Absence of fever/infection during neutropenic period Progressing        Knowledge Deficit     Patient/family/caregiver demonstrates understanding of disease process, treatment plan, medications, and discharge instructions Progressing        PAIN - ADULT     Verbalizes/displays adequate comfort level or baseline comfort level Progressing        SAFETY ADULT     Patient will remain free of falls Progressing     Maintain or return to baseline ADL function Progressing     Maintain or return mobility status to optimal level Progressing

## 2018-10-30 NOTE — PROGRESS NOTES
Progress Note - OB/GYN   Evie Ashby 34 y o  female MRN: [id number]  Unit/Bed#: L&D 306-01 Encounter: [id number]    Evie Ashby is a patient of [facility]    Assessment:  Post partum day #1 s/p , stable, and doing well    Plan:  1  Continue routine post partum care   - Encourage ambulation   - Encourage breastfeeding  2  Continue current meds    - See list below   - Pain controlled  3  Disposition   - Stable, vitals WNL   - Anticipate discharge home tomorrow      Subjective/Objective     Chief Complaint:     Post partum day 1 from a  with no complaints today    Subjective:   Pain: no  Tolerating Oral Intake: yes  Voiding: yes  Flatus: no  Bowel Movement: no  Ambulating: yes  Breastfeeding: Breastfeeding  Chest Pain: no  Shortness of Breath: no  Leg Pain/Discomfort: no  Lochia: minimal    Objective:   Vitals: /75 (BP Location: Left arm)   Pulse 69   Temp 98 5 °F (36 9 °C) (Oral)   Resp 16   Ht 5' 2" (1 575 m)   Wt 86 2 kg (190 lb)   LMP 2018 (Exact Date)   Breastfeeding?  Yes   BMI 34 75 kg/m²       Intake/Output Summary (Last 24 hours) at 10/30/18 0628  Last data filed at 10/30/18 0301   Gross per 24 hour   Intake          2460 42 ml   Output             1475 ml   Net           985 42 ml       Lab Results   Component Value Date    WBC 12 62 (H) 10/29/2018    HGB 11 3 (L) 10/29/2018    HCT 33 3 (L) 10/29/2018    MCV 80 (L) 10/29/2018     10/29/2018       Meds/Allergies   Current Facility-Administered Medications   Medication Dose Route Frequency    acetaminophen (TYLENOL) tablet 650 mg  650 mg Oral Q6H PRN    acetaminophen (TYLENOL) tablet 975 mg  975 mg Oral Q8H PRN    benzocaine-menthol-lanolin-aloe (DERMOPLAST) 20-0 5 % topical spray   Topical 4x Daily PRN    calcium carbonate (TUMS) chewable tablet 1,000 mg  1,000 mg Oral Daily PRN    diphenhydrAMINE (BENADRYL) tablet 25 mg  25 mg Oral Q6H PRN    docusate sodium (COLACE) capsule 100 mg  100 mg Oral BID    hydrocortisone 1 % cream 1 application  1 application Topical PRN    ibuprofen (MOTRIN) tablet 600 mg  600 mg Oral Q6H PRN    ondansetron (ZOFRAN) injection 4 mg  4 mg Intravenous Q8H PRN    oxyCODONE (ROXICODONE) IR tablet 5 mg  5 mg Oral Q4H PRN    oxytocin (PITOCIN) 30 Units in lactated ringers 500 mL infusion  250 flaquito-units/min Intravenous Continuous    prenatal multivitamin tablet 1 tablet  1 tablet Oral Daily    witch hazel-glycerin (TUCKS) topical pad 1 pad  1 pad Topical PRN       Physical Exam:  General: in no apparent distress, well developed and well nourished and non-toxic  Cardiovascular: Cor RRR  Lungs: clear to auscultation bilaterally  Abdomen: abdomen is soft without significant tenderness, masses, organomegaly or guarding  Fundus: Firm and non-tender, 1 cm below the umbilicus  Lower extremeties: nontender    Lakesha Overall, DO  PGY-2 OB/GYN   10/30/2018 6:28 AM

## 2018-10-30 NOTE — LACTATION NOTE
This note was copied from a baby's chart  Met with mother  Provided mother with Ready, Set, Baby booklet  Discussed Skin to Skin contact an benefits to mom and baby  Talked about the delay of the first bath until baby has adjusted  Spoke about the benefits of rooming in  Feeding on cue and what that means for recognizing infant's hunger  Avoidance of pacifiers for the first month discussed  Talked about exclusive breastfeeding for the first 6 months  Positioning and latch reviewed as well as showing images of other feeding positions  Discussed the properties of a good latch in any position  Reviewed hand/manual expression  Demo and teach back of hand expression  Discussed s/s that baby is getting enough milk and some s/s that breastfeeding dyad may need further help  Gave information on common concerns, what to expect the first few weeks after delivery, preparing for other caregivers, and how partners can help  Resources for support also provided  Mother verbalized breastfeeding is going well  Enc to call for assistance as needed,phone # given

## 2018-10-31 VITALS
RESPIRATION RATE: 18 BRPM | HEIGHT: 62 IN | DIASTOLIC BLOOD PRESSURE: 78 MMHG | HEART RATE: 77 BPM | OXYGEN SATURATION: 97 % | WEIGHT: 190 LBS | BODY MASS INDEX: 34.96 KG/M2 | SYSTOLIC BLOOD PRESSURE: 116 MMHG | TEMPERATURE: 97.7 F

## 2018-10-31 RX ORDER — ACETAMINOPHEN 325 MG/1
650 TABLET ORAL EVERY 6 HOURS PRN
Qty: 30 TABLET | Refills: 0 | Status: SHIPPED | OUTPATIENT
Start: 2018-10-31 | End: 2020-09-02

## 2018-10-31 RX ORDER — DIAPER,BRIEF,INFANT-TODD,DISP
1 EACH MISCELLANEOUS AS NEEDED
Qty: 30 G | Refills: 0 | Status: SHIPPED | OUTPATIENT
Start: 2018-10-31 | End: 2019-12-03 | Stop reason: ALTCHOICE

## 2018-10-31 RX ORDER — DOCUSATE SODIUM 100 MG/1
100 CAPSULE, LIQUID FILLED ORAL 2 TIMES DAILY
Qty: 60 CAPSULE | Refills: 0 | Status: SHIPPED | OUTPATIENT
Start: 2018-10-31 | End: 2019-12-03 | Stop reason: ALTCHOICE

## 2018-10-31 RX ORDER — IBUPROFEN 200 MG
600 TABLET ORAL EVERY 6 HOURS PRN
Qty: 30 TABLET | Refills: 0 | Status: SHIPPED | OUTPATIENT
Start: 2018-10-31 | End: 2020-03-13

## 2018-10-31 RX ORDER — IBUPROFEN 600 MG/1
600 TABLET ORAL EVERY 6 HOURS PRN
Qty: 30 TABLET | Refills: 0 | Status: SHIPPED | OUTPATIENT
Start: 2018-10-31 | End: 2018-10-31

## 2018-10-31 RX ORDER — DOCUSATE SODIUM 100 MG/1
100 CAPSULE, LIQUID FILLED ORAL 2 TIMES DAILY
Qty: 10 CAPSULE | Refills: 0 | Status: SHIPPED | OUTPATIENT
Start: 2018-10-31 | End: 2018-10-31

## 2018-10-31 RX ADMIN — Medication 1 TABLET: at 07:59

## 2018-10-31 RX ADMIN — ACETAMINOPHEN 650 MG: 325 TABLET, FILM COATED ORAL at 07:58

## 2018-10-31 RX ADMIN — DOCUSATE SODIUM 100 MG: 100 CAPSULE, LIQUID FILLED ORAL at 07:59

## 2018-10-31 NOTE — PROGRESS NOTES
Discharge teaching done with pt  Verbalized understanding  Karrie Nissen  Appropriate questions asked

## 2018-10-31 NOTE — PLAN OF CARE
Problem: PAIN - ADULT  Goal: Verbalizes/displays adequate comfort level or baseline comfort level  Interventions:  - Encourage patient to monitor pain and request assistance  - Assess pain using appropriate pain scale  - Administer analgesics based on type and severity of pain and evaluate response  - Implement non-pharmacological measures as appropriate and evaluate response  - Consider cultural and social influences on pain and pain management  - Notify physician/advanced practitioner if interventions unsuccessful or patient reports new pain   Outcome: Adequate for Discharge      Problem: INFECTION - ADULT  Goal: Absence or prevention of progression during hospitalization  INTERVENTIONS:  - Assess and monitor for signs and symptoms of infection  - Monitor lab/diagnostic results  - Monitor all insertion sites, i e  indwelling lines, tubes, and drains  - Monitor endotracheal (as able) and nasal secretions for changes in amount and color  - [place] appropriate cooling/warming therapies per order  - Administer medications as ordered  - Instruct and encourage patient and family to use good hand hygiene technique  - Identify and instruct in appropriate isolation precautions for identified infection/condition   Outcome: Completed Date Met: 10/31/18    Goal: Absence of fever/infection during neutropenic period  INTERVENTIONS:  - Monitor WBC  - Implement neutropenic guidelines   Outcome: Completed Date Met: 10/31/18  No neutropenia  Problem: SAFETY ADULT  Goal: Patient will remain free of falls  INTERVENTIONS:  - Assess patient frequently for physical needs  -  Identify cognitive and physical deficits and behaviors that affect risk of falls    -  [place] fall precautions as indicated by assessment   - Educate patient/family on patient safety including physical limitations  - Instruct patient to call for assistance with activity based on assessment  - Modify environment to reduce risk of injury  - Consider OT/PT consult to assist with strengthening/mobility   Outcome: Adequate for Discharge    Goal: Maintain or return to baseline ADL function  INTERVENTIONS:  -  Assess patient's ability to carry out ADLs; assess patient's baseline for ADL function and identify physical deficits which impact ability to perform ADLs (bathing, care of mouth/teeth, toileting, grooming, dressing, etc )  - Assess/evaluate cause of self-care deficits   - Assess range of motion  - Assess patient's mobility; develop plan if impaired  - Assess patient's need for assistive devices and provide as appropriate  - Encourage maximum independence but intervene and supervise when necessary  ¯ Involve family in performance of ADLs  ¯ Assess for home care needs following discharge   ¯ Request OT consult to assist with ADL evaluation and planning for discharge  ¯ Provide patient education as appropriate   Outcome: Adequate for Discharge    Goal: Maintain or return mobility status to optimal level  INTERVENTIONS:  - Assess patient's baseline mobility status (ambulation, transfers, stairs, etc )    - Identify cognitive and physical deficits and behaviors that affect mobility  - Identify mobility aids required to assist with transfers and/or ambulation (gait belt, sit-to-stand, lift, walker, cane, etc )  - [place] fall precautions as indicated by assessment  - Record patient progress and toleration of activity level on Mobility SBAR; progress patient to next Phase/Stage  - Instruct patient to call for assistance with activity based on assessment  - Request Rehabilitation consult to assist with strengthening/weightbearing, etc    Outcome: Adequate for Discharge      Problem: Knowledge Deficit  Goal: Patient/family/caregiver demonstrates understanding of disease process, treatment plan, medications, and discharge instructions  Complete learning assessment and assess knowledge base    Interventions:  - Provide teaching at level of understanding  - Provide teaching via preferred learning methods   Outcome: Adequate for Discharge      Problem: DISCHARGE PLANNING  Goal: Discharge to home or other facility with appropriate resources  INTERVENTIONS:  - Identify barriers to discharge w/patient and caregiver  - Arrange for needed discharge resources and transportation as appropriate  - Identify discharge learning needs (meds, wound care, etc )  - Arrange for interpretive services to assist at discharge as needed  - Refer to Case Management Department for coordinating discharge planning if the patient needs post-hospital services based on physician/advanced practitioner order or complex needs related to functional status, cognitive ability, or social support system   Outcome: Completed Date Met: 10/31/18      Problem: POSTPARTUM  Goal: Experiences normal postpartum course  INTERVENTIONS:  - Monitor maternal vital signs  - Assess uterine involution and lochia   Outcome: Adequate for Discharge    Goal: Appropriate maternal -  bonding  INTERVENTIONS:  - Identify family support  - Assess for appropriate maternal/infant bonding   -Encourage maternal/infant bonding opportunities  - Referral to  or  as needed   Outcome: Adequate for Discharge    Goal: Establishment of infant feeding pattern  INTERVENTIONS:  - Assess breast/bottle feeding  - Refer to lactation as needed   Outcome: Adequate for Discharge    Goal: Incision(s), wounds(s) or drain site(s) healing without S/S of infection  INTERVENTIONS  - Assess and document risk factors for skin impairment   - Assess and document dressing, incision, wound bed, drain sites and surrounding tissue  - Initiate Nutrition services consult and/or wound management as needed   Outcome: Adequate for Discharge

## 2018-10-31 NOTE — PROGRESS NOTES
Progress Note - OB/GYN   Iraida Cormier 34 y o  female MRN: [id number]  Unit/Bed#: L&D 306-01 Encounter: [id number]    Iraida Cormier is a patient of [facility]    Assessment:  Post partum day #2 s/p , stable, and doing well    Plan:  1  Continue routine post partum care   - Encourage ambulation   - Encourage breastfeeding  2  Continue current meds    - See list below   - Pain controlled  3  Disposition   - Stable, vitals WNL   - Anticipate discharge home today      Subjective/Objective     Chief Complaint:     Post partum day 2 from a  with no complaints today    Subjective:   Pain: no  Tolerating Oral Intake: yes  Voiding: yes  Flatus: yes  Bowel Movement: no  Ambulating: yes  Breastfeeding: Breastfeeding  Chest Pain: no  Shortness of Breath: no  Leg Pain/Discomfort: no  Lochia: minimal    Objective:   Vitals: /74 (BP Location: Right arm)   Pulse 83   Temp 98 7 °F (37 1 °C) (Oral)   Resp 18   Ht 5' 2" (1 575 m)   Wt 86 2 kg (190 lb)   LMP 2018 (Exact Date)   SpO2 97%   Breastfeeding?  Yes   BMI 34 75 kg/m²       Intake/Output Summary (Last 24 hours) at 10/31/18 0623  Last data filed at 10/30/18 0700   Gross per 24 hour   Intake                0 ml   Output              400 ml   Net             -400 ml       Lab Results   Component Value Date    WBC 12 62 (H) 10/29/2018    HGB 11 3 (L) 10/29/2018    HCT 33 3 (L) 10/29/2018    MCV 80 (L) 10/29/2018     10/29/2018       Meds/Allergies   Current Facility-Administered Medications   Medication Dose Route Frequency    acetaminophen (TYLENOL) tablet 650 mg  650 mg Oral Q6H PRN    acetaminophen (TYLENOL) tablet 975 mg  975 mg Oral Q8H PRN    benzocaine-menthol-lanolin-aloe (DERMOPLAST) 20-0 5 % topical spray   Topical 4x Daily PRN    calcium carbonate (TUMS) chewable tablet 1,000 mg  1,000 mg Oral Daily PRN    diphenhydrAMINE (BENADRYL) tablet 25 mg  25 mg Oral Q6H PRN    docusate sodium (COLACE) capsule 100 mg  100 mg Oral BID    hydrocortisone 1 % cream 1 application  1 application Topical PRN    ibuprofen (MOTRIN) tablet 600 mg  600 mg Oral Q6H PRN    ondansetron (ZOFRAN) injection 4 mg  4 mg Intravenous Q8H PRN    oxyCODONE (ROXICODONE) IR tablet 5 mg  5 mg Oral Q4H PRN    oxytocin (PITOCIN) 30 Units in lactated ringers 500 mL infusion  250 flaquito-units/min Intravenous Continuous    prenatal multivitamin tablet 1 tablet  1 tablet Oral Daily    witch hazel-glycerin (TUCKS) topical pad 1 pad  1 pad Topical PRN       Physical Exam:  General: in no apparent distress and well developed and well nourished  Cardiovascular: Cor RRR  Lungs: clear to auscultation bilaterally  Abdomen: abdomen is soft without significant tenderness, masses, organomegaly or guarding  Fundus: Firm and non-tender, 2 cm below the umbilicus  Lower extremeties: nontender    Elbert Casas DO  PGY-2 OB/GYN   10/31/2018 6:23 AM

## 2018-11-06 LAB — PLACENTA IN STORAGE: NORMAL

## 2018-11-08 ENCOUNTER — TELEPHONE (OUTPATIENT)
Dept: OBGYN CLINIC | Facility: MEDICAL CENTER | Age: 33
End: 2018-11-08

## 2018-11-08 NOTE — TELEPHONE ENCOUNTER
Pt  Calling c/o "feeling sutures" and having difficulty voiding  States she has not voided since 0800 today  Advised to try dermaplast and/or sitz bath and tylenol to get relief from discomfort  If still unable to void feeling pain-advised to seek care in ED as may need cath

## 2018-12-11 ENCOUNTER — POSTPARTUM VISIT (OUTPATIENT)
Dept: OBGYN CLINIC | Facility: MEDICAL CENTER | Age: 33
End: 2018-12-11

## 2018-12-11 VITALS
HEIGHT: 62 IN | SYSTOLIC BLOOD PRESSURE: 106 MMHG | WEIGHT: 177 LBS | BODY MASS INDEX: 32.57 KG/M2 | DIASTOLIC BLOOD PRESSURE: 64 MMHG

## 2018-12-11 DIAGNOSIS — Z30.011 ENCOUNTER FOR BCP (BIRTH CONTROL PILLS) INITIAL PRESCRIPTION: Primary | ICD-10-CM

## 2018-12-11 PROCEDURE — PNV: Performed by: NURSE PRACTITIONER

## 2018-12-11 RX ORDER — NORGESTIMATE AND ETHINYL ESTRADIOL 0.25-0.035
1 KIT ORAL DAILY
Qty: 84 TABLET | Refills: 0 | Status: SHIPPED | OUTPATIENT
Start: 2018-12-11 | End: 2018-12-26 | Stop reason: SDUPTHER

## 2018-12-11 NOTE — PROGRESS NOTES
OB POSTPARTUM VISIT PROGRESS NOTE  Date of Encounter: 2018    Danielle Ramirez    : 1985  (34 y o )  MR: [id number]    Subjective   Viv Ford is in for her postpartum visit  She is now   She delivered by normal spontaneous vaginal delivery  She delivered a Male on 10/29  Infant's name is Aliyah  She's generally doing well, denies current pain or bleeding issues, and has no significant depression issues  Is bottle feeding  History of diabetes in pregnancy No  Complications in pregnancy: No   We discussed all appropriate contraceptive options and she chooses ocp, used in the past w/o problems       Objective     /64   Ht 5' 2" (1 575 m)   Wt 80 3 kg (177 lb)   LMP 2018 (Exact Date)   Breastfeeding? No   BMI 32 37 kg/m²     General:   appears stated age, cooperative, alert normal mood and affect   Neck: Neck: normal, supple,trachea midline, no masses   Heart: regular rate and rhythm, S1, S2 normal, no murmur, click, rub or gallop   Lungs: clear to auscultation bilaterally   Breasts: normal appearance, no masses or tenderness   Abdomen: soft, non-tender, without masses or organomegaly   Vulva: normal female genitalia   Vagina: normal vagina   Urethra: normal   Cervix: Normal, no discharge  Uterus: normal size, contour, position, consistency, mobility, non-tender   Adnexa: normal adnexa     Assessment/Plan   Diagnoses and all orders for this visit:    Postpartum state     (spontaneous vaginal delivery)      34 y o  y/o  now 6 weeks post partum and doing well  1  LMP:not yet  2  Last pap smear: up to date  3  Contraception: rx for sprintec sent to pharm  Will call b/f she runs out for refill  4  RTO in 1 year for annual visit  5  Baby and Me resources provided if needed  6  Baby: Doing well and meeting all milestones accordingly as per patient from the visit to the pediatrician     7  EPDS: [id number] [facility], CRNP

## 2018-12-26 DIAGNOSIS — Z30.011 ENCOUNTER FOR BCP (BIRTH CONTROL PILLS) INITIAL PRESCRIPTION: ICD-10-CM

## 2018-12-26 RX ORDER — NORGESTIMATE AND ETHINYL ESTRADIOL 0.25-0.035
1 KIT ORAL DAILY
Qty: 84 TABLET | Refills: 0 | Status: SHIPPED | OUTPATIENT
Start: 2018-12-26 | End: 2019-12-03 | Stop reason: ALTCHOICE

## 2019-12-03 ENCOUNTER — OFFICE VISIT (OUTPATIENT)
Dept: FAMILY MEDICINE CLINIC | Facility: CLINIC | Age: 34
End: 2019-12-03
Payer: COMMERCIAL

## 2019-12-03 VITALS
DIASTOLIC BLOOD PRESSURE: 50 MMHG | BODY MASS INDEX: 32.76 KG/M2 | TEMPERATURE: 101 F | HEIGHT: 62 IN | HEART RATE: 110 BPM | WEIGHT: 178 LBS | RESPIRATION RATE: 14 BRPM | OXYGEN SATURATION: 98 % | SYSTOLIC BLOOD PRESSURE: 98 MMHG

## 2019-12-03 DIAGNOSIS — M54.2 NECK PAIN: ICD-10-CM

## 2019-12-03 DIAGNOSIS — Z00.01 ENCOUNTER FOR GENERAL ADULT MEDICAL EXAMINATION WITH ABNORMAL FINDINGS: Primary | ICD-10-CM

## 2019-12-03 DIAGNOSIS — J06.9 ACUTE URI: ICD-10-CM

## 2019-12-03 DIAGNOSIS — E01.0 THYROMEGALY: ICD-10-CM

## 2019-12-03 DIAGNOSIS — N92.6 LATE PERIOD: ICD-10-CM

## 2019-12-03 LAB — SL AMB POCT URINE HCG: NORMAL

## 2019-12-03 PROCEDURE — 99395 PREV VISIT EST AGE 18-39: CPT | Performed by: INTERNAL MEDICINE

## 2019-12-03 PROCEDURE — 81025 URINE PREGNANCY TEST: CPT | Performed by: INTERNAL MEDICINE

## 2019-12-03 PROCEDURE — 3008F BODY MASS INDEX DOCD: CPT | Performed by: INTERNAL MEDICINE

## 2019-12-03 PROCEDURE — 99214 OFFICE O/P EST MOD 30 MIN: CPT | Performed by: INTERNAL MEDICINE

## 2019-12-03 RX ORDER — AMOXICILLIN 500 MG/1
500 CAPSULE ORAL EVERY 8 HOURS SCHEDULED
Qty: 30 CAPSULE | Refills: 0 | Status: SHIPPED | OUTPATIENT
Start: 2019-12-03 | End: 2019-12-13

## 2019-12-03 RX ORDER — GUAIFENESIN/DEXTROMETHORPHAN 100-10MG/5
5 SYRUP ORAL 3 TIMES DAILY PRN
Qty: 118 ML | Refills: 1 | Status: SHIPPED | OUTPATIENT
Start: 2019-12-03 | End: 2020-03-13

## 2019-12-03 NOTE — LETTER
December 3, 2019     Patient: Delisa Carter   YOB: 1985   Date of Visit: 12/3/2019       To Whom it May Concern:    Delisa Carter is under my professional care  She was seen in my office on 12/3/2019  She needs to stay off work today Tuesday 12/3/19 and tomorrow Wednesday 12/4/19  If you have any questions or concerns, please do not hesitate to call our office at [phone]             Sincerely,                Yony Pinto MD

## 2019-12-03 NOTE — PROGRESS NOTES
Assessment/Plan:         Diagnoses and all orders for this visit:    Encounter for general adult medical examination with abnormal findings; Done in Detail  RTC in 1mo w ;  -     Comprehensive metabolic panel; Future  -     CBC and differential; Future  -     Hemoglobin A1C; Future  -     Magnesium; Future  -     Lipid panel; Future  -     Thyroid Antibodies Panel; Future  -     T4, free; Future  -     TSH, 3rd generation; Future  -     US thyroid; Future  -     Vitamin B12; Future  -     Vitamin D 25 hydroxy; Future  -     UA (URINE) with reflex to Scope    Acute URI; Bed Rest  Increase Po Fluids  Off work 48 H  Start :  -     amoxicillin (AMOXIL) 500 mg capsule; Take 1 capsule (500 mg total) by mouth every 8 (eight) hours for 10 days  -     dextromethorphan-guaiFENesin (ROBITUSSIN DM)  mg/5 mL syrup; Take 5 mL by mouth 3 (three) times a day as needed for cough or congestion    Thyromegaly; RTC in 1mo w Blood work    Neck pain; Try :   -     Diclofenac Sodium 1 5 % SOLN; Place 5 mL on the skin 4 (four) times a day Apply to neck area   Moist Heat  Home P T  Late period; Cause ?? RTC in 1 mow  :BMI Counseling: Body mass index is 32 56 kg/m²  The BMI is above normal  Nutrition recommendations include decreasing portion sizes  Exercise recommendations include moderate physical activity 150 minutes/week  Pharmacotherapy was ordered to help aid in weight loss  Patient referred to PCP due to patient being overweight          -     POCT urine HCG  -     Pregnancy Test (HCG Qualitative); Future        Subjective:      Patient ID: Bobby Larry is a 30 y o  female  30 Y O lady is here for Annual Physical Exam and Regular check up and increasing Cold symptoms for 1 week, no recent Blood work, Med list reviewed w pt, LMP; 5 weeks ago          The following portions of the patient's history were reviewed and updated as appropriate: allergies, current medications, past family history, past medical history, past social history, past surgical history and problem list     Review of Systems   Constitutional: Positive for fatigue and fever  Negative for chills  HENT: Positive for postnasal drip, sinus pressure, sinus pain and sore throat  Negative for congestion, facial swelling, trouble swallowing and voice change  Eyes: Negative for pain, discharge and visual disturbance  Respiratory: Positive for cough  Negative for shortness of breath and wheezing  Cardiovascular: Negative for chest pain, palpitations and leg swelling  Gastrointestinal: Negative for abdominal pain, blood in stool, constipation, diarrhea and nausea  Endocrine: Negative for polydipsia, polyphagia and polyuria  Genitourinary: Negative for difficulty urinating, hematuria and urgency  Musculoskeletal: Positive for neck pain and neck stiffness  Negative for arthralgias and myalgias  Skin: Negative for rash  Neurological: Positive for dizziness  Negative for tremors, weakness and headaches  Hematological: Negative for adenopathy  Does not bruise/bleed easily  Psychiatric/Behavioral: Negative for dysphoric mood, sleep disturbance and suicidal ideas  Objective:      BP 98/50 (BP Location: Left arm, Patient Position: Sitting, Cuff Size: Standard)   Pulse (!) 110   Temp (!) 101 °F (38 3 °C) (Oral)   Resp 14   Ht 5' 2" (1 575 m)   Wt 80 7 kg (178 lb)   SpO2 98%   BMI 32 56 kg/m²          Physical Exam   Constitutional: She is oriented to person, place, and time  She appears well-nourished  No distress  HENT:   Head: Normocephalic  Mouth/Throat: No oropharyngeal exudate  Acute URI    Eyes: Pupils are equal, round, and reactive to light  Conjunctivae are normal  No scleral icterus  Neck: Neck supple  Thyromegaly present  Cardiovascular: Normal rate, regular rhythm and normal heart sounds  No murmur heard  Pulmonary/Chest: Effort normal and breath sounds normal  No respiratory distress  She has no wheezes  She has no rales  Abdominal: Soft  Bowel sounds are normal  She exhibits no distension  There is no tenderness  There is no rebound and no guarding  Musculoskeletal: She exhibits tenderness  She exhibits no edema  Lymphadenopathy:     She has no cervical adenopathy  Neurological: She is alert and oriented to person, place, and time  No cranial nerve deficit  Coordination normal    Skin: No rash noted  No erythema  Psychiatric: She has a normal mood and affect

## 2020-03-13 ENCOUNTER — OFFICE VISIT (OUTPATIENT)
Dept: FAMILY MEDICINE CLINIC | Facility: CLINIC | Age: 35
End: 2020-03-13
Payer: COMMERCIAL

## 2020-03-13 VITALS
WEIGHT: 180.8 LBS | HEART RATE: 91 BPM | DIASTOLIC BLOOD PRESSURE: 86 MMHG | SYSTOLIC BLOOD PRESSURE: 114 MMHG | TEMPERATURE: 98.6 F | BODY MASS INDEX: 33.27 KG/M2 | HEIGHT: 62 IN | RESPIRATION RATE: 14 BRPM | OXYGEN SATURATION: 98 %

## 2020-03-13 DIAGNOSIS — J06.9 ACUTE URI: Primary | ICD-10-CM

## 2020-03-13 DIAGNOSIS — Z72.0 TOBACCO ABUSE: ICD-10-CM

## 2020-03-13 LAB — NON VENT ROOM AIR: 310 %

## 2020-03-13 PROCEDURE — 1036F TOBACCO NON-USER: CPT | Performed by: INTERNAL MEDICINE

## 2020-03-13 PROCEDURE — 99213 OFFICE O/P EST LOW 20 MIN: CPT | Performed by: INTERNAL MEDICINE

## 2020-03-13 PROCEDURE — 94150 VITAL CAPACITY TEST: CPT | Performed by: INTERNAL MEDICINE

## 2020-03-13 RX ORDER — AZITHROMYCIN 250 MG/1
TABLET, FILM COATED ORAL
Qty: 10 TABLET | Refills: 0 | Status: SHIPPED | OUTPATIENT
Start: 2020-03-13 | End: 2020-03-20

## 2020-03-13 RX ORDER — GUAIFENESIN/DEXTROMETHORPHAN 100-10MG/5
5 SYRUP ORAL 3 TIMES DAILY PRN
Qty: 118 ML | Refills: 1 | Status: SHIPPED | OUTPATIENT
Start: 2020-03-13 | End: 2020-06-11

## 2020-03-13 NOTE — LETTER
March 13, 2020     Patient: Hipolito Valdez   YOB: 1985   Date of Visit: 3/13/2020       To Whom it May Concern:    Hipolito Valdez is under my professional care  She was seen in my office on 3/13/2020  She is to be off work 03/13/2020 thru 03/16/2020 and may return on 03/17/2020  If you have any questions or concerns, please don't hesitate to call           Sincerely,          Lacey Guzman MD        CC:

## 2020-03-13 NOTE — PROGRESS NOTES
Assessment/Plan:         Diagnoses and all orders for this visit:    Acute URI; Bed Rest, Increase Po fluids,  Off work 72 H  Start :  -     POCT peak flow  -     azithromycin (ZITHROMAX) 250 mg tablet; Take two tabs Daily for 3 days then one Tab daily for 4 days    with food/Lunch  -     dextromethorphan-guaiFENesin (ROBITUSSIN DM)  mg/5 mL syrup; Take 5 mL by mouth 3 (three) times a day as needed for cough or congestion    RTC in 2-3 weeks  Tobacco Abuse : Pt was advised to quit [place]    Subjective:      Patient ID: Malaika Gentile is a 30 y o  female  30  Y O lady is here for Increasing cold symptoms for last 1 week, No recent Blood work, No Meds,  LMP;1 week ago  The following portions of the patient's history were reviewed and updated as appropriate: allergies, current medications, past family history, past medical history, past social history, past surgical history and problem list     Review of Systems   Constitutional: Positive for fatigue  Negative for chills and fever  HENT: Positive for congestion, postnasal drip, sinus pressure and sore throat  Negative for facial swelling, trouble swallowing and voice change  Eyes: Negative for pain, discharge and visual disturbance  Respiratory: Positive for cough  Negative for shortness of breath and wheezing  Cardiovascular: Negative for chest pain, palpitations and leg swelling  Gastrointestinal: Negative for abdominal pain, blood in stool, constipation, diarrhea and nausea  Endocrine: Negative for polydipsia, polyphagia and polyuria  Genitourinary: Negative for difficulty urinating, hematuria and urgency  Musculoskeletal: Negative for arthralgias and myalgias  Skin: Negative for rash  Neurological: Negative for dizziness, tremors, weakness and headaches  Hematological: Negative for adenopathy  Does not bruise/bleed easily  Psychiatric/Behavioral: Negative for dysphoric mood, sleep disturbance and suicidal ideas  Objective:      /86 (BP Location: Left arm, Patient Position: Sitting, Cuff Size: Standard)   Pulse 91   Temp 98 6 °F (37 °C) (Tympanic)   Resp 14   Ht 5' 2 4" (1 585 m)   Wt 82 kg (180 lb 12 8 oz)   LMP 02/26/2020 (Exact Date)   SpO2 98%   BMI 32 65 kg/m²          Physical Exam   Constitutional: She is oriented to person, place, and time  She appears well-nourished  No distress  HENT:   Head: Normocephalic  Mouth/Throat: No oropharyngeal exudate  Acute URI   Eyes: Pupils are equal, round, and reactive to light  Conjunctivae are normal  No scleral icterus  Neck: Neck supple  No thyromegaly present  Cardiovascular: Normal rate, regular rhythm and normal heart sounds  No murmur heard  Pulmonary/Chest: Effort normal and breath sounds normal  No respiratory distress  She has no wheezes  She has no rales  Abdominal: Soft  Bowel sounds are normal  She exhibits no distension  There is no tenderness  There is no rebound and no guarding  Musculoskeletal: She exhibits no edema or tenderness  Lymphadenopathy:     She has no cervical adenopathy  Neurological: She is alert and oriented to person, place, and time  No cranial nerve deficit  Coordination normal    Skin: No rash noted  No erythema  Psychiatric: She has a normal mood and affect

## 2020-06-11 ENCOUNTER — TELEMEDICINE (OUTPATIENT)
Dept: FAMILY MEDICINE CLINIC | Facility: CLINIC | Age: 35
End: 2020-06-11
Payer: COMMERCIAL

## 2020-06-11 DIAGNOSIS — R21 RASH: Primary | ICD-10-CM

## 2020-06-11 DIAGNOSIS — E01.0 THYROMEGALY: ICD-10-CM

## 2020-06-11 DIAGNOSIS — L40.9 PSORIASIS: ICD-10-CM

## 2020-06-11 PROCEDURE — 99214 OFFICE O/P EST MOD 30 MIN: CPT | Performed by: INTERNAL MEDICINE

## 2020-06-11 PROCEDURE — 1036F TOBACCO NON-USER: CPT | Performed by: INTERNAL MEDICINE

## 2020-06-11 RX ORDER — TRIAMCINOLONE ACETONIDE 5 MG/G
CREAM TOPICAL 3 TIMES DAILY
Qty: 60 G | Refills: 1 | Status: SHIPPED | OUTPATIENT
Start: 2020-06-11 | End: 2020-09-02

## 2020-07-01 ENCOUNTER — TELEPHONE (OUTPATIENT)
Dept: FAMILY MEDICINE CLINIC | Facility: CLINIC | Age: 35
End: 2020-07-01

## 2020-07-01 DIAGNOSIS — Z20.828 EXPOSURE TO SARS-ASSOCIATED CORONAVIRUS: Primary | ICD-10-CM

## 2020-07-01 DIAGNOSIS — Z20.828 EXPOSURE TO SARS-ASSOCIATED CORONAVIRUS: ICD-10-CM

## 2020-07-01 PROCEDURE — U0003 INFECTIOUS AGENT DETECTION BY NUCLEIC ACID (DNA OR RNA); SEVERE ACUTE RESPIRATORY SYNDROME CORONAVIRUS 2 (SARS-COV-2) (CORONAVIRUS DISEASE [COVID-19]), AMPLIFIED PROBE TECHNIQUE, MAKING USE OF HIGH THROUGHPUT TECHNOLOGIES AS DESCRIBED BY CMS-2020-01-R: HCPCS

## 2020-07-01 PROCEDURE — 99213 OFFICE O/P EST LOW 20 MIN: CPT | Performed by: INTERNAL MEDICINE

## 2020-07-01 NOTE — PROGRESS NOTES
COVID-19 Virtual Visit     Assessment/Plan:    Problem List Items Addressed This Visit     None      Visit Diagnoses     Exposure to SARS-associated coronavirus    -  Primary    Relevant Orders    MISC COVID-19 TEST- Collected at [facility] or [facility]        This virtual check-in was done via telephone  Disposition:      I recommended self-quarantine for 14 days and to call back for worsening symptoms or development of shortness of breath    As a result of this visit, I have not referred the patient for further respiratory evaluation  I spent 5 minutes directly with the patient during this visit    Encounter provider Serena Hampton MD    Provider located at [facility]  [id number] [id number]    Recent Visits  No visits were found meeting these conditions  Showing recent visits within past 7 days and meeting all other requirements     Today's Visits  Date Type Provider Dept   07/01/20 Telephone Cheryle Burke-Gregory, MA [facility]   Showing today's visits and meeting all other requirements     Future Appointments  No visits were found meeting these conditions  Showing future appointments within next 150 days and meeting all other requirements        Patient agrees to participate in a virtual check in via telephone or video visit instead of presenting to the office to address urgent/immediate medical needs  Patient is aware this is a billable service  After connecting through telephone, the patient was identified by name and date of birth  Myriam Thompson was informed that this was a telemedicine visit and that the exam was being conducted confidentially over secure lines  My office door was closed  No one else was in the room  Myriam Thompson acknowledged consent and understanding of privacy and security of the telemedicine visit    I informed the patient that I have reviewed her record in Epic and presented the opportunity for her to ask any questions regarding the visit today  The patient agreed to participate  Colleen Monsalve is a 30 y o  female who is concerned about COVID-19  She reports no symptoms, requires screening  She has not experienced no symptoms, requires screening She has had contact with a person who is under investigation for or who is positive for COVID-19 within the last 14 days  She has not been hospitalized recently for fever and/or lower respiratory symptoms  Past Medical History:   Diagnosis Date    Abnormal Pap smear of cervix     Disease of thyroid gland     h/o thyroid nodules    HPV (human papilloma virus) infection     Migraine     Urinary tract infection     utix1 in past    Varicella     positive history       Past Surgical History:   Procedure Laterality Date    TONSILLECTOMY      WISDOM TOOTH EXTRACTION         Current Outpatient Medications   Medication Sig Dispense Refill    acetaminophen (TYLENOL) 325 mg tablet Take 2 tablets (650 mg total) by mouth every 6 (six) hours as needed for mild pain, headaches or fever 30 tablet 0    triamcinolone (KENALOG) 0 5 % cream Apply topically 3 (three) times a day Apply to affected areas 60 g 1     No current facility-administered medications for this visit  No Known Allergies    Review of Systems    Video Exam    There were no vitals filed for this visit  Marycarmen Rivas appears healthy  Physical Exam     VIRTUAL VISIT DISCLAIMER    Joann Whitlock acknowledges that she has consented to an online visit or consultation  She understands that the online visit is based solely on information provided by her, and that, in the absence of a face-to-face physical evaluation by the physician, the diagnosis she receives is both limited and provisional in terms of accuracy and completeness  This is not intended to replace a full medical face-to-face evaluation by the physician   Joann Whitlock understands and accepts these terms

## 2020-07-03 ENCOUNTER — TELEPHONE (OUTPATIENT)
Dept: OTHER | Facility: OTHER | Age: 35
End: 2020-07-03

## 2020-07-03 NOTE — TELEPHONE ENCOUNTER
"I am trying to get the results of my COVID test  I got it done on 7/1 at [facility]"

## 2020-07-08 LAB — SARS-COV-2 RNA SPEC QL NAA+PROBE: NOT DETECTED

## 2020-07-08 NOTE — TELEPHONE ENCOUNTER
This patient was routed to us in error  It appears that results are finally in  Routing to correct practice clinical team to advise patient

## 2020-08-18 ENCOUNTER — TRANSCRIBE ORDERS (OUTPATIENT)
Dept: ADMINISTRATIVE | Facility: HOSPITAL | Age: 35
End: 2020-08-18

## 2020-08-18 DIAGNOSIS — E04.2 MULTIPLE THYROID NODULES: Primary | ICD-10-CM

## 2020-08-21 ENCOUNTER — HOSPITAL ENCOUNTER (OUTPATIENT)
Dept: RADIOLOGY | Facility: IMAGING CENTER | Age: 35
Discharge: HOME | End: 2020-08-21
Payer: COMMERCIAL

## 2020-08-21 DIAGNOSIS — E01.0 THYROMEGALY: Primary | ICD-10-CM

## 2020-08-21 DIAGNOSIS — E01.0 THYROMEGALY: ICD-10-CM

## 2020-08-21 DIAGNOSIS — E04.2 MULTIPLE THYROID NODULES: ICD-10-CM

## 2020-08-21 PROCEDURE — 76536 US EXAM OF HEAD AND NECK: CPT

## 2020-08-28 ENCOUNTER — APPOINTMENT (OUTPATIENT)
Dept: LAB | Facility: IMAGING CENTER | Age: 35
End: 2020-08-28
Payer: COMMERCIAL

## 2020-08-28 DIAGNOSIS — N92.6 LATE PERIOD: ICD-10-CM

## 2020-08-28 DIAGNOSIS — Z00.01 ENCOUNTER FOR GENERAL ADULT MEDICAL EXAMINATION WITH ABNORMAL FINDINGS: ICD-10-CM

## 2020-08-28 LAB
25(OH)D3 SERPL-MCNC: 9.4 NG/ML (ref 30–100)
ALBUMIN SERPL BCP-MCNC: 3.6 G/DL (ref 3.5–5)
ALP SERPL-CCNC: 91 U/L (ref 46–116)
ALT SERPL W P-5'-P-CCNC: 57 U/L (ref 12–78)
ANION GAP SERPL CALCULATED.3IONS-SCNC: 5 MMOL/L (ref 4–13)
AST SERPL W P-5'-P-CCNC: 36 U/L (ref 5–45)
BACTERIA UR QL AUTO: ABNORMAL /HPF
BASOPHILS # BLD AUTO: 0.04 THOUSANDS/ΜL (ref 0–0.1)
BASOPHILS NFR BLD AUTO: 1 % (ref 0–1)
BILIRUB SERPL-MCNC: 0.32 MG/DL (ref 0.2–1)
BILIRUB UR QL STRIP: NEGATIVE
BUN SERPL-MCNC: 11 MG/DL (ref 5–25)
CALCIUM SERPL-MCNC: 9.3 MG/DL (ref 8.3–10.1)
CHLORIDE SERPL-SCNC: 107 MMOL/L (ref 100–108)
CHOLEST SERPL-MCNC: 165 MG/DL (ref 50–200)
CLARITY UR: CLEAR
CO2 SERPL-SCNC: 28 MMOL/L (ref 21–32)
COLOR UR: YELLOW
CREAT SERPL-MCNC: 0.75 MG/DL (ref 0.6–1.3)
EOSINOPHIL # BLD AUTO: 0.26 THOUSAND/ΜL (ref 0–0.61)
EOSINOPHIL NFR BLD AUTO: 3 % (ref 0–6)
ERYTHROCYTE [DISTWIDTH] IN BLOOD BY AUTOMATED COUNT: 13.7 % (ref 11.6–15.1)
EST. AVERAGE GLUCOSE BLD GHB EST-MCNC: 105 MG/DL
GFR SERPL CREATININE-BSD FRML MDRD: 106 ML/MIN/1.73SQ M
GLUCOSE P FAST SERPL-MCNC: 97 MG/DL (ref 65–99)
GLUCOSE UR STRIP-MCNC: NEGATIVE MG/DL
HBA1C MFR BLD: 5.3 %
HCG SERPL QL: NEGATIVE
HCT VFR BLD AUTO: 42.1 % (ref 34.8–46.1)
HDLC SERPL-MCNC: 47 MG/DL
HGB BLD-MCNC: 13.7 G/DL (ref 11.5–15.4)
HGB UR QL STRIP.AUTO: NEGATIVE
HYALINE CASTS #/AREA URNS LPF: ABNORMAL /LPF
IMM GRANULOCYTES # BLD AUTO: 0.03 THOUSAND/UL (ref 0–0.2)
IMM GRANULOCYTES NFR BLD AUTO: 0 % (ref 0–2)
KETONES UR STRIP-MCNC: NEGATIVE MG/DL
LDLC SERPL CALC-MCNC: 100 MG/DL (ref 0–100)
LEUKOCYTE ESTERASE UR QL STRIP: ABNORMAL
LYMPHOCYTES # BLD AUTO: 2.23 THOUSANDS/ΜL (ref 0.6–4.47)
LYMPHOCYTES NFR BLD AUTO: 26 % (ref 14–44)
MAGNESIUM SERPL-MCNC: 2.6 MG/DL (ref 1.6–2.6)
MCH RBC QN AUTO: 27.3 PG (ref 26.8–34.3)
MCHC RBC AUTO-ENTMCNC: 32.5 G/DL (ref 31.4–37.4)
MCV RBC AUTO: 84 FL (ref 82–98)
MONOCYTES # BLD AUTO: 0.41 THOUSAND/ΜL (ref 0.17–1.22)
MONOCYTES NFR BLD AUTO: 5 % (ref 4–12)
NEUTROPHILS # BLD AUTO: 5.54 THOUSANDS/ΜL (ref 1.85–7.62)
NEUTS SEG NFR BLD AUTO: 65 % (ref 43–75)
NITRITE UR QL STRIP: NEGATIVE
NON-SQ EPI CELLS URNS QL MICRO: ABNORMAL /HPF
NONHDLC SERPL-MCNC: 118 MG/DL
NRBC BLD AUTO-RTO: 0 /100 WBCS
PH UR STRIP.AUTO: 6 [PH]
PLATELET # BLD AUTO: 363 THOUSANDS/UL (ref 149–390)
PMV BLD AUTO: 9.4 FL (ref 8.9–12.7)
POTASSIUM SERPL-SCNC: 4.1 MMOL/L (ref 3.5–5.3)
PROT SERPL-MCNC: 7.8 G/DL (ref 6.4–8.2)
PROT UR STRIP-MCNC: NEGATIVE MG/DL
RBC # BLD AUTO: 5.01 MILLION/UL (ref 3.81–5.12)
RBC #/AREA URNS AUTO: ABNORMAL /HPF
SODIUM SERPL-SCNC: 140 MMOL/L (ref 136–145)
SP GR UR STRIP.AUTO: 1.02 (ref 1–1.03)
T4 FREE SERPL-MCNC: 0.97 NG/DL (ref 0.76–1.46)
TRIGL SERPL-MCNC: 88 MG/DL
TSH SERPL DL<=0.05 MIU/L-ACNC: 1.96 UIU/ML (ref 0.36–3.74)
UROBILINOGEN UR QL STRIP.AUTO: 0.2 E.U./DL
VIT B12 SERPL-MCNC: 244 PG/ML (ref 100–900)
WBC # BLD AUTO: 8.51 THOUSAND/UL (ref 4.31–10.16)
WBC #/AREA URNS AUTO: ABNORMAL /HPF

## 2020-08-28 PROCEDURE — 85025 COMPLETE CBC W/AUTO DIFF WBC: CPT

## 2020-08-28 PROCEDURE — 82306 VITAMIN D 25 HYDROXY: CPT

## 2020-08-28 PROCEDURE — 86376 MICROSOMAL ANTIBODY EACH: CPT

## 2020-08-28 PROCEDURE — 84703 CHORIONIC GONADOTROPIN ASSAY: CPT

## 2020-08-28 PROCEDURE — 82607 VITAMIN B-12: CPT

## 2020-08-28 PROCEDURE — 81001 URINALYSIS AUTO W/SCOPE: CPT | Performed by: INTERNAL MEDICINE

## 2020-08-28 PROCEDURE — 84439 ASSAY OF FREE THYROXINE: CPT

## 2020-08-28 PROCEDURE — 86800 THYROGLOBULIN ANTIBODY: CPT

## 2020-08-28 PROCEDURE — 84443 ASSAY THYROID STIM HORMONE: CPT

## 2020-08-28 PROCEDURE — 83735 ASSAY OF MAGNESIUM: CPT

## 2020-08-28 PROCEDURE — 80053 COMPREHEN METABOLIC PANEL: CPT

## 2020-08-28 PROCEDURE — 83036 HEMOGLOBIN GLYCOSYLATED A1C: CPT

## 2020-08-28 PROCEDURE — 36415 COLL VENOUS BLD VENIPUNCTURE: CPT

## 2020-08-28 PROCEDURE — 80061 LIPID PANEL: CPT

## 2020-08-29 LAB
THYROGLOB AB SERPL-ACNC: <1 IU/ML (ref 0–0.9)
THYROPEROXIDASE AB SERPL-ACNC: <9 IU/ML (ref 0–34)

## 2020-09-02 ENCOUNTER — OFFICE VISIT (OUTPATIENT)
Dept: FAMILY MEDICINE CLINIC | Facility: CLINIC | Age: 35
End: 2020-09-02
Payer: COMMERCIAL

## 2020-09-02 ENCOUNTER — ANNUAL EXAM (OUTPATIENT)
Dept: OBGYN CLINIC | Facility: CLINIC | Age: 35
End: 2020-09-02
Payer: COMMERCIAL

## 2020-09-02 VITALS
SYSTOLIC BLOOD PRESSURE: 118 MMHG | OXYGEN SATURATION: 98 % | HEART RATE: 80 BPM | RESPIRATION RATE: 14 BRPM | HEIGHT: 62 IN | DIASTOLIC BLOOD PRESSURE: 80 MMHG | WEIGHT: 195 LBS | BODY MASS INDEX: 35.88 KG/M2 | TEMPERATURE: 98.2 F

## 2020-09-02 DIAGNOSIS — N30.00 ACUTE CYSTITIS WITHOUT HEMATURIA: ICD-10-CM

## 2020-09-02 DIAGNOSIS — E04.1 THYROID NODULE: Primary | ICD-10-CM

## 2020-09-02 DIAGNOSIS — M54.2 NECK PAIN: ICD-10-CM

## 2020-09-02 DIAGNOSIS — R21 RASH: ICD-10-CM

## 2020-09-02 DIAGNOSIS — R79.89 LOW VITAMIN D LEVEL: ICD-10-CM

## 2020-09-02 DIAGNOSIS — E06.3 HYPOTHYROIDISM DUE TO HASHIMOTO'S THYROIDITIS: ICD-10-CM

## 2020-09-02 DIAGNOSIS — E03.8 HYPOTHYROIDISM DUE TO HASHIMOTO'S THYROIDITIS: ICD-10-CM

## 2020-09-02 DIAGNOSIS — Z01.419 ENCOUNTER FOR WELL WOMAN EXAM WITH ROUTINE GYNECOLOGICAL EXAM: Primary | ICD-10-CM

## 2020-09-02 DIAGNOSIS — N39.498 OTHER URINARY INCONTINENCE: ICD-10-CM

## 2020-09-02 DIAGNOSIS — E53.8 LOW VITAMIN B12 LEVEL: ICD-10-CM

## 2020-09-02 PROCEDURE — 87086 URINE CULTURE/COLONY COUNT: CPT | Performed by: OBSTETRICS & GYNECOLOGY

## 2020-09-02 PROCEDURE — 99214 OFFICE O/P EST MOD 30 MIN: CPT | Performed by: INTERNAL MEDICINE

## 2020-09-02 PROCEDURE — 87624 HPV HI-RISK TYP POOLED RSLT: CPT | Performed by: OBSTETRICS & GYNECOLOGY

## 2020-09-02 PROCEDURE — 99395 PREV VISIT EST AGE 18-39: CPT | Performed by: OBSTETRICS & GYNECOLOGY

## 2020-09-02 PROCEDURE — G0145 SCR C/V CYTO,THINLAYER,RESCR: HCPCS | Performed by: PATHOLOGY

## 2020-09-02 PROCEDURE — G0124 SCREEN C/V THIN LAYER BY MD: HCPCS | Performed by: PATHOLOGY

## 2020-09-02 RX ORDER — LANOLIN ALCOHOL/MO/W.PET/CERES
1000 CREAM (GRAM) TOPICAL DAILY
Qty: 90 TABLET | Refills: 3 | Status: SHIPPED | OUTPATIENT
Start: 2020-09-02 | End: 2020-11-23 | Stop reason: SDUPTHER

## 2020-09-02 RX ORDER — LEVOFLOXACIN 500 MG/1
500 TABLET, FILM COATED ORAL EVERY 24 HOURS
Qty: 10 TABLET | Refills: 0 | Status: SHIPPED | OUTPATIENT
Start: 2020-09-02 | End: 2020-09-12

## 2020-09-02 RX ORDER — TRIAMCINOLONE ACETONIDE 5 MG/G
CREAM TOPICAL
COMMUNITY
Start: 2020-06-11 | End: 2020-11-23

## 2020-09-02 RX ORDER — ERGOCALCIFEROL 1.25 MG/1
CAPSULE ORAL
Qty: 30 CAPSULE | Refills: 2 | Status: SHIPPED | OUTPATIENT
Start: 2020-09-02 | End: 2020-11-23 | Stop reason: SDUPTHER

## 2020-09-03 VITALS
TEMPERATURE: 98.6 F | SYSTOLIC BLOOD PRESSURE: 120 MMHG | BODY MASS INDEX: 35.51 KG/M2 | HEIGHT: 62 IN | DIASTOLIC BLOOD PRESSURE: 80 MMHG | WEIGHT: 193 LBS

## 2020-09-04 LAB — BACTERIA UR CULT: NORMAL

## 2020-09-04 NOTE — PROGRESS NOTES
ASSESSMENT & PLAN: Luis Alfredo Sahu is a 37 y o  Bosie Pillow with normal gynecologic exam     1   Routine well woman exam done today  2  Pap and HPV:  The patient's last pap and hpv was years ago   It was normal     Pap and cotesting was done today  Current ASCCP Guidelines reviewed  3   The following were reviewed in today's visit: breast self exam, family planning choices, exercise and healthy diet  4  Urinalysis was performed secondary to incontinence/ Kegel exercises encouraged    CC:  Annual Gynecologic Examination    HPI: Luis Alfredo Sahu is a 37 y o  Bosie Pillow who presents for annual gynecologic examination  She has the following concerns:  Urinary incontinence that has gotten worse in the last 2-3 months    Health Maintenance:    She wears her seatbelt routinely  She does perform regular monthly self breast exams  She feels safe at home  Past Medical History:   Diagnosis Date    Abnormal Pap smear of cervix     Disease of thyroid gland     h/o thyroid nodules    HPV (human papilloma virus) infection     Migraine     Urinary tract infection     utix1 in past    Varicella     positive history       Past Surgical History:   Procedure Laterality Date    TONSILLECTOMY      WISDOM TOOTH EXTRACTION         Past OB/Gyn History:  OB History        1    Para   1    Term   1       0    AB   0    Living   1       SAB        TAB        Ectopic        Multiple   0    Live Births   1               Pt does not have menstrual issues  History of sexually transmitted infection: No   History of abnormal pap smears: No      Patient is currently sexually active  heterosexual   The current method of family planning is none      Family History   Problem Relation Age of Onset    Diabetes Father     Hyperlipidemia Father     Hypertension Father     No Known Problems Sister     Heart defect Brother     Diabetes Paternal Grandfather        Social History:  Social History     Socioeconomic History  Marital status: /Civil Union     Spouse name: Not on file    Number of children: Not on file    Years of education: Not on file    Highest education level: Not on file   Occupational History    Not on file   Social Needs    Financial resource strain: Not hard at all   Elodia-Patricia insecurity     Worry: Never true     Inability: Never true   [employer] needs     Medical: No     Non-medical: No   Tobacco Use    Smoking status: Former Smoker     Last attempt to quit: 2018     Years since quittin 6    Smokeless tobacco: Never Used    Tobacco comment: Quit with knowledge of pregnancy   Substance and Sexual Activity    Alcohol use: No    Drug use: No    Sexual activity: Yes     Partners: Male   Lifestyle    Physical activity     Days per week: Patient refused     Minutes per session: Patient refused    Stress: Not at all   Relationships    Social connections     Talks on phone: Patient refused     Gets together: Patient refused     Attends Christianity service: Patient refused     Active member of club or organization: Patient refused     Attends meetings of clubs or organizations: Patient refused     Relationship status: Patient refused    Intimate partner violence     Fear of current or ex partner: No     Emotionally abused: No     Physically abused: No     Forced sexual activity: No   Other Topics Concern    Not on file   Social History Narrative    Not on file         No Known Allergies      Current Outpatient Medications:     ergocalciferol (VITAMIN D2) 50,000 units, Take one cap daily for 2 weeks then weekly for 10 weeks, Disp: 30 capsule, Rfl: 2    levofloxacin (LEVAQUIN) 500 mg tablet, Take 1 tablet (500 mg total) by mouth every 24 hours for 10 days With food/Lunch, Disp: 10 tablet, Rfl: 0    mupirocin (BACTROBAN) 2 % ointment, Apply topically 3 (three) times a day Apply to affected area, Disp: 22 g, Rfl: 1    triamcinolone (KENALOG) 0 5 % cream, , Disp: , Rfl:     vitamin B-12 (VITAMIN B-12) 1,000 mcg tablet, Take 1 tablet (1,000 mcg total) by mouth daily, Disp: 90 tablet, Rfl: 3      Review of Systems  Constitutional :no fever, feels well, no tiredness, no recent weight gain or loss  ENT: no ear ache, no loss of hearing, no nosebleeds or nasal discharge, no sore throat or hoarseness  Cardiovascular: no complaints of slow or fast heart beat, no chest pain, no palpitations, no leg claudication or lower extremity edema  Respiratory: no complaints of shortness of shortness of breath, no CAI  Breasts:no complaints of breast pain, breast lump, or nipple discharge  Gastrointestinal: no complaints of abdominal pain, constipation, nausea, vomiting, or diarrhea or bloody stools  Genitourinary : no complaints of dysuria, incontinence, pelvic pain, no dysmenorrhea, vaginal discharge or abnormal vaginal bleeding and as noted in HPI  Musculoskeletal: no complaints of arthralgia, no myalgia, no joint swelling or stiffness, no limb pain or swelling  Integumentary: no complaints of skin rash or lesion, itching or dry skin  Neurological: no complaints of headache, no confusion, no numbness or tingling, no dizziness or fainting    Objective      /80   Temp 98 6 °F (37 °C)   Ht 5' 2 4" (1 585 m)   Wt 87 5 kg (193 lb)   LMP 08/29/2020   BMI 34 85 kg/m²   General:   appears stated age, cooperative, alert normal mood and affect   Neck: normal, supple,trachea midline, no masses   Heart: regular rate and rhythm, S1, S2 normal, no murmur, click, rub or gallop   Lungs: clear to auscultation bilaterally   Breasts: normal appearance, no masses or tenderness   Abdomen: soft, non-tender, without masses or organomegaly   Vulva: normal   Vagina: normal vagina, no discharge, exudate, lesion, or erythema   Urethra: normal   Cervix: Normal, no discharge  Nontender     Uterus: normal size, contour, position, consistency, mobility, non-tender   Adnexa: no mass, fullness, tenderness   Lymphatic palpation of lymph nodes in neck, axilla, groin and/or other locations: no lymphadenopathy or masses noted   Skin normal skin turgor and no rashes     Psychiatric orientation to person, place, and time: normal  mood and affect: normal

## 2020-09-05 LAB
HPV HR 12 DNA CVX QL NAA+PROBE: POSITIVE
HPV16 DNA CVX QL NAA+PROBE: NEGATIVE
HPV18 DNA CVX QL NAA+PROBE: NEGATIVE

## 2020-09-11 LAB
LAB AP GYN PRIMARY INTERPRETATION: ABNORMAL
Lab: ABNORMAL
PATH INTERP SPEC-IMP: ABNORMAL

## 2020-09-17 ENCOUNTER — PROCEDURE VISIT (OUTPATIENT)
Dept: OBGYN CLINIC | Facility: CLINIC | Age: 35
End: 2020-09-17
Payer: COMMERCIAL

## 2020-09-17 VITALS — BODY MASS INDEX: 35.45 KG/M2 | DIASTOLIC BLOOD PRESSURE: 75 MMHG | SYSTOLIC BLOOD PRESSURE: 140 MMHG | WEIGHT: 193.8 LBS

## 2020-09-17 DIAGNOSIS — R87.610 ASCUS WITH POSITIVE HIGH RISK HPV CERVICAL: Primary | ICD-10-CM

## 2020-09-17 DIAGNOSIS — R87.810 ASCUS WITH POSITIVE HIGH RISK HPV CERVICAL: Primary | ICD-10-CM

## 2020-09-17 LAB — SL AMB POCT URINE HCG: NEGATIVE

## 2020-09-17 PROCEDURE — 57454 BX/CURETT OF CERVIX W/SCOPE: CPT | Performed by: OBSTETRICS & GYNECOLOGY

## 2020-09-17 PROCEDURE — 81025 URINE PREGNANCY TEST: CPT | Performed by: OBSTETRICS & GYNECOLOGY

## 2020-09-17 PROCEDURE — 88305 TISSUE EXAM BY PATHOLOGIST: CPT | Performed by: PATHOLOGY

## 2020-09-17 NOTE — PROGRESS NOTES
Colposcopy    Date/Time: 9/17/2020 9:07 AM  Performed by: Princess Cason MD  Authorized by: Princess Cason MD     Consent:     Consent obtained:  Verbal and written    Consent given by:  Patient    Procedural risks discussed:  Bleeding, repeat procedure and infection    Patient questions answered: yes      Patient agrees, verbalizes understanding, and wants to proceed: yes      Educational handouts given: yes      Instructions and paperwork completed: yes    Pre-procedure:     Pre-procedure timeout performed: yes      Prepped with: acetic acid    Indication:     Indication:  ASC-US  Procedure:     Procedure: Colposcopy w/ cervical biopsy and ECC      Under satisfactory analgesia the patient was prepped and draped in the dorsal lithotomy position: yes      [place] speculum was placed in the vagina: yes      Under colposcopic examination the transition zone was seen in entirety: yes      Endocervix was curetted using a Kevorkian curette: yes      Cervical biopsy performed with a cervical biopsy punch: yes      Monsel's solution was applied: yes      Biopsy(s): yes      Location:  1 o clock   Post-procedure:     Findings: White epithelium      Impression: Low grade cervical dysplasia      Patient tolerance of procedure:   Tolerated well, no immediate complications

## 2020-09-20 ENCOUNTER — TELEPHONE (OUTPATIENT)
Dept: LABOR AND DELIVERY | Facility: HOSPITAL | Age: 35
End: 2020-09-20

## 2020-09-20 ENCOUNTER — TELEPHONE (OUTPATIENT)
Dept: OTHER | Facility: OTHER | Age: 35
End: 2020-09-20

## 2020-09-20 NOTE — TELEPHONE ENCOUNTER
I called the patient back  Pt reports she underwent a colposcopy with biopsy on Thursday and she was walking today and she felt something on her leg  When she went to the bathroom she found a half finger size brownish tissue colored entity  She denies any significant bleeding or recurrence  Pt wants to make sure everything is ok  Reviewed with patient this is likely the Monsel's solution applied after her procedure  Pt will continue to monitor  Will call with any recurrence

## 2020-09-20 NOTE — TELEPHONE ENCOUNTER
[phone] / Evie Handler / [birth year] / pt had surgery not to long ago/ pt stated she was walking and something felt out       Alton texted Dr Ward Peters

## 2020-10-01 ENCOUNTER — OFFICE VISIT (OUTPATIENT)
Dept: OBGYN CLINIC | Facility: CLINIC | Age: 35
End: 2020-10-01
Payer: COMMERCIAL

## 2020-10-01 VITALS
BODY MASS INDEX: 35.85 KG/M2 | SYSTOLIC BLOOD PRESSURE: 120 MMHG | HEIGHT: 62 IN | DIASTOLIC BLOOD PRESSURE: 70 MMHG | TEMPERATURE: 98 F | WEIGHT: 194.8 LBS

## 2020-10-01 DIAGNOSIS — R87.610 ATYPICAL SQUAMOUS CELLS OF UNDETERMINED SIGNIFICANCE ON CYTOLOGIC SMEAR OF CERVIX (ASC-US): Primary | ICD-10-CM

## 2020-10-01 PROCEDURE — 99212 OFFICE O/P EST SF 10 MIN: CPT | Performed by: OBSTETRICS & GYNECOLOGY

## 2020-10-30 DIAGNOSIS — Z20.822 COVID-19 RULED OUT: ICD-10-CM

## 2020-10-30 DIAGNOSIS — Z20.822 COVID-19 RULED OUT: Primary | ICD-10-CM

## 2020-10-30 PROCEDURE — U0003 INFECTIOUS AGENT DETECTION BY NUCLEIC ACID (DNA OR RNA); SEVERE ACUTE RESPIRATORY SYNDROME CORONAVIRUS 2 (SARS-COV-2) (CORONAVIRUS DISEASE [COVID-19]), AMPLIFIED PROBE TECHNIQUE, MAKING USE OF HIGH THROUGHPUT TECHNOLOGIES AS DESCRIBED BY CMS-2020-01-R: HCPCS | Performed by: INTERNAL MEDICINE

## 2020-10-30 PROCEDURE — 99213 OFFICE O/P EST LOW 20 MIN: CPT | Performed by: INTERNAL MEDICINE

## 2020-10-31 LAB — SARS-COV-2 RNA SPEC QL NAA+PROBE: NOT DETECTED

## 2020-11-18 ENCOUNTER — TELEPHONE (OUTPATIENT)
Dept: FAMILY MEDICINE CLINIC | Facility: CLINIC | Age: 35
End: 2020-11-18

## 2020-11-18 DIAGNOSIS — Z20.822 COVID-19 RULED OUT: Primary | ICD-10-CM

## 2020-11-18 PROCEDURE — 99213 OFFICE O/P EST LOW 20 MIN: CPT | Performed by: INTERNAL MEDICINE

## 2020-11-19 DIAGNOSIS — Z20.822 COVID-19 RULED OUT: ICD-10-CM

## 2020-11-19 PROCEDURE — U0003 INFECTIOUS AGENT DETECTION BY NUCLEIC ACID (DNA OR RNA); SEVERE ACUTE RESPIRATORY SYNDROME CORONAVIRUS 2 (SARS-COV-2) (CORONAVIRUS DISEASE [COVID-19]), AMPLIFIED PROBE TECHNIQUE, MAKING USE OF HIGH THROUGHPUT TECHNOLOGIES AS DESCRIBED BY CMS-2020-01-R: HCPCS | Performed by: INTERNAL MEDICINE

## 2020-11-21 ENCOUNTER — TELEPHONE (OUTPATIENT)
Dept: UROLOGY | Facility: CLINIC | Age: 35
End: 2020-11-21

## 2020-11-21 LAB — SARS-COV-2 RNA SPEC QL NAA+PROBE: DETECTED

## 2020-11-23 ENCOUNTER — TELEMEDICINE (OUTPATIENT)
Dept: FAMILY MEDICINE CLINIC | Facility: CLINIC | Age: 35
End: 2020-11-23
Payer: COMMERCIAL

## 2020-11-23 VITALS — WEIGHT: 194 LBS | BODY MASS INDEX: 35.7 KG/M2 | HEIGHT: 62 IN

## 2020-11-23 DIAGNOSIS — R79.89 LOW VITAMIN D LEVEL: ICD-10-CM

## 2020-11-23 DIAGNOSIS — J06.9 ACUTE URI: ICD-10-CM

## 2020-11-23 DIAGNOSIS — U07.1 LAB TEST POSITIVE FOR DETECTION OF COVID-19 VIRUS: Primary | ICD-10-CM

## 2020-11-23 DIAGNOSIS — E53.8 LOW VITAMIN B12 LEVEL: ICD-10-CM

## 2020-11-23 PROCEDURE — 99214 OFFICE O/P EST MOD 30 MIN: CPT | Performed by: INTERNAL MEDICINE

## 2020-11-23 RX ORDER — BUDESONIDE AND FORMOTEROL FUMARATE DIHYDRATE 80; 4.5 UG/1; UG/1
2 AEROSOL RESPIRATORY (INHALATION) 2 TIMES DAILY
Qty: 1 INHALER | Refills: 1 | Status: SHIPPED | OUTPATIENT
Start: 2020-11-23 | End: 2021-01-04

## 2020-11-23 RX ORDER — LANOLIN ALCOHOL/MO/W.PET/CERES
1000 CREAM (GRAM) TOPICAL DAILY
Qty: 90 TABLET | Refills: 3 | Status: SHIPPED | OUTPATIENT
Start: 2020-11-23 | End: 2020-12-16 | Stop reason: SDUPTHER

## 2020-11-23 RX ORDER — ERGOCALCIFEROL 1.25 MG/1
CAPSULE ORAL
Qty: 30 CAPSULE | Refills: 2 | Status: SHIPPED | OUTPATIENT
Start: 2020-11-23 | End: 2020-12-16 | Stop reason: SDUPTHER

## 2020-11-23 RX ORDER — PREDNISONE 10 MG/1
TABLET ORAL
Qty: 20 TABLET | Refills: 0 | Status: SHIPPED | OUTPATIENT
Start: 2020-11-23 | End: 2020-12-03

## 2020-11-23 RX ORDER — GUAIFENESIN/DEXTROMETHORPHAN 100-10MG/5
5 SYRUP ORAL 3 TIMES DAILY PRN
Qty: 118 ML | Refills: 1 | Status: SHIPPED | OUTPATIENT
Start: 2020-11-23 | End: 2020-12-03

## 2020-11-23 RX ORDER — AZITHROMYCIN 250 MG/1
TABLET, FILM COATED ORAL
Qty: 10 TABLET | Refills: 0 | Status: SHIPPED | OUTPATIENT
Start: 2020-11-23 | End: 2020-11-30

## 2020-12-03 ENCOUNTER — TELEMEDICINE (OUTPATIENT)
Dept: FAMILY MEDICINE CLINIC | Facility: CLINIC | Age: 35
End: 2020-12-03
Payer: COMMERCIAL

## 2020-12-03 DIAGNOSIS — U07.1 LAB TEST POSITIVE FOR DETECTION OF COVID-19 VIRUS: ICD-10-CM

## 2020-12-03 DIAGNOSIS — K21.9 GASTROESOPHAGEAL REFLUX DISEASE WITHOUT ESOPHAGITIS: Primary | ICD-10-CM

## 2020-12-03 PROCEDURE — 99213 OFFICE O/P EST LOW 20 MIN: CPT | Performed by: INTERNAL MEDICINE

## 2020-12-03 RX ORDER — PANTOPRAZOLE SODIUM 40 MG/1
40 TABLET, DELAYED RELEASE ORAL DAILY
Qty: 30 TABLET | Refills: 1 | Status: SHIPPED | OUTPATIENT
Start: 2020-12-03 | End: 2020-12-25

## 2020-12-04 ENCOUNTER — TELEPHONE (OUTPATIENT)
Dept: OBGYN CLINIC | Facility: MEDICAL CENTER | Age: 35
End: 2020-12-04

## 2020-12-04 ENCOUNTER — TELEPHONE (OUTPATIENT)
Dept: PERINATAL CARE | Facility: CLINIC | Age: 35
End: 2020-12-04

## 2020-12-04 DIAGNOSIS — N91.2 AMENORRHEA: Primary | ICD-10-CM

## 2020-12-09 ENCOUNTER — TELEMEDICINE (OUTPATIENT)
Dept: OBGYN CLINIC | Facility: MEDICAL CENTER | Age: 35
End: 2020-12-09
Payer: COMMERCIAL

## 2020-12-09 DIAGNOSIS — N91.2 AMENORRHEA: Primary | ICD-10-CM

## 2020-12-09 DIAGNOSIS — U07.1 COVID-19: ICD-10-CM

## 2020-12-09 DIAGNOSIS — U07.1 LAB TEST POSITIVE FOR DETECTION OF COVID-19 VIRUS: ICD-10-CM

## 2020-12-09 PROCEDURE — U0003 INFECTIOUS AGENT DETECTION BY NUCLEIC ACID (DNA OR RNA); SEVERE ACUTE RESPIRATORY SYNDROME CORONAVIRUS 2 (SARS-COV-2) (CORONAVIRUS DISEASE [COVID-19]), AMPLIFIED PROBE TECHNIQUE, MAKING USE OF HIGH THROUGHPUT TECHNOLOGIES AS DESCRIBED BY CMS-2020-01-R: HCPCS | Performed by: INTERNAL MEDICINE

## 2020-12-09 PROCEDURE — 99214 OFFICE O/P EST MOD 30 MIN: CPT | Performed by: OBSTETRICS & GYNECOLOGY

## 2020-12-10 LAB — SARS-COV-2 RNA SPEC QL NAA+PROBE: NOT DETECTED

## 2020-12-16 ENCOUNTER — LAB (OUTPATIENT)
Dept: LAB | Facility: CLINIC | Age: 35
End: 2020-12-16
Payer: COMMERCIAL

## 2020-12-16 DIAGNOSIS — E04.1 THYROID NODULE: ICD-10-CM

## 2020-12-16 DIAGNOSIS — E03.8 HYPOTHYROIDISM DUE TO HASHIMOTO'S THYROIDITIS: ICD-10-CM

## 2020-12-16 DIAGNOSIS — R79.89 LOW VITAMIN D LEVEL: ICD-10-CM

## 2020-12-16 DIAGNOSIS — E53.8 LOW VITAMIN B12 LEVEL: ICD-10-CM

## 2020-12-16 DIAGNOSIS — N91.2 AMENORRHEA: ICD-10-CM

## 2020-12-16 DIAGNOSIS — E06.3 HYPOTHYROIDISM DUE TO HASHIMOTO'S THYROIDITIS: ICD-10-CM

## 2020-12-16 LAB
25(OH)D3 SERPL-MCNC: 30.5 NG/ML (ref 30–100)
B-HCG SERPL-ACNC: ABNORMAL MIU/ML
PROGEST SERPL-MCNC: 16.5 NG/ML
T4 FREE SERPL-MCNC: 1.03 NG/DL (ref 0.76–1.46)
TSH SERPL DL<=0.05 MIU/L-ACNC: 2 UIU/ML (ref 0.36–3.74)
VIT B12 SERPL-MCNC: 240 PG/ML (ref 100–900)

## 2020-12-16 PROCEDURE — 84702 CHORIONIC GONADOTROPIN TEST: CPT

## 2020-12-16 PROCEDURE — 86800 THYROGLOBULIN ANTIBODY: CPT

## 2020-12-16 PROCEDURE — 36415 COLL VENOUS BLD VENIPUNCTURE: CPT

## 2020-12-16 PROCEDURE — 84443 ASSAY THYROID STIM HORMONE: CPT

## 2020-12-16 PROCEDURE — 84144 ASSAY OF PROGESTERONE: CPT

## 2020-12-16 PROCEDURE — 84439 ASSAY OF FREE THYROXINE: CPT

## 2020-12-16 PROCEDURE — 82306 VITAMIN D 25 HYDROXY: CPT

## 2020-12-16 PROCEDURE — 86376 MICROSOMAL ANTIBODY EACH: CPT

## 2020-12-16 PROCEDURE — 82607 VITAMIN B-12: CPT

## 2020-12-16 RX ORDER — ERGOCALCIFEROL 1.25 MG/1
CAPSULE ORAL
Qty: 30 CAPSULE | Refills: 2 | Status: SHIPPED | OUTPATIENT
Start: 2020-12-16 | End: 2021-01-04

## 2020-12-16 RX ORDER — LANOLIN ALCOHOL/MO/W.PET/CERES
1000 CREAM (GRAM) TOPICAL DAILY
Qty: 90 TABLET | Refills: 3 | Status: SHIPPED | OUTPATIENT
Start: 2020-12-16 | End: 2021-01-04

## 2020-12-18 LAB
THYROGLOB AB SERPL-ACNC: <1 IU/ML (ref 0–0.9)
THYROPEROXIDASE AB SERPL-ACNC: <9 IU/ML (ref 0–34)

## 2020-12-22 ENCOUNTER — ULTRASOUND (OUTPATIENT)
Dept: OBGYN CLINIC | Facility: MEDICAL CENTER | Age: 35
End: 2020-12-22
Payer: COMMERCIAL

## 2020-12-22 VITALS
SYSTOLIC BLOOD PRESSURE: 116 MMHG | DIASTOLIC BLOOD PRESSURE: 76 MMHG | WEIGHT: 196.1 LBS | BODY MASS INDEX: 36.09 KG/M2 | HEIGHT: 62 IN

## 2020-12-22 DIAGNOSIS — N92.6 MISSED MENSES: Primary | ICD-10-CM

## 2020-12-22 PROCEDURE — 99214 OFFICE O/P EST MOD 30 MIN: CPT | Performed by: STUDENT IN AN ORGANIZED HEALTH CARE EDUCATION/TRAINING PROGRAM

## 2020-12-22 PROCEDURE — 76801 OB US < 14 WKS SINGLE FETUS: CPT | Performed by: STUDENT IN AN ORGANIZED HEALTH CARE EDUCATION/TRAINING PROGRAM

## 2020-12-25 DIAGNOSIS — K21.9 GASTROESOPHAGEAL REFLUX DISEASE WITHOUT ESOPHAGITIS: ICD-10-CM

## 2020-12-25 RX ORDER — PANTOPRAZOLE SODIUM 40 MG/1
TABLET, DELAYED RELEASE ORAL
Qty: 30 TABLET | Refills: 1 | Status: SHIPPED | OUTPATIENT
Start: 2020-12-25 | End: 2021-01-04

## 2020-12-31 NOTE — PATIENT INSTRUCTIONS
Pregnancy at 7 to [address]:   Changes happening to your body:  Pregnancy hormones may cause your body to go through many changes during this stage of your pregnancy  You may feel more tired than usual, and have mood swings, nausea and vomiting, and headaches  Your breasts may feel tender and swollen and you may urinate more frequently  Seek care immediately if:   · You have pain or cramping in your abdomen or low back  · You have heavy vaginal bleeding or clotting  · You pass material that looks like tissue or large clots  Collect the material and bring it with you  Call your doctor or obstetrician if:   · You have light bleeding  · You have chills or a fever  · You have vaginal itching, burning, or pain  · You have yellow, green, white, or foul-smelling vaginal discharge  · You have pain or burning when you urinate, less urine than usual, or pink or bloody urine  · You have questions or concerns about your condition or care  How to care for yourself at this stage of your pregnancy:   · Manage nausea and vomiting  Avoid fatty and spicy foods  Eat small meals throughout the day instead of large meals  Lana may help to decrease nausea  Ask your healthcare provider about other ways of decreasing nausea and vomiting  · Eat a variety of healthy foods  Healthy foods include fruits, vegetables, whole-grain breads, low-fat dairy foods, beans, lean meats, and fish  Drink liquids as directed  Ask how much liquid to drink each day and which liquids are best for you  Limit caffeine to less than 200 milligrams each day  Limit your intake of fish to 2 servings each week  Choose fish low in mercury such as canned light tuna, shrimp, salmon, cod, or tilapia  Do not  eat fish high in mercury such as swordfish, tilefish, kwaku mackerel, and shark  · Take prenatal vitamins as directed    Your need for certain vitamins and minerals, such as folic acid, increases during pregnancy  Prenatal vitamins provide some of the extra vitamins and minerals you need  Prenatal vitamins may also help to decrease the risk of certain birth defects  · Ask how much weight you should gain each month  Too much or too little weight gain can be unhealthy for you and your baby  · Do not smoke  Smoking increases your risk of a miscarriage and other health problems during your pregnancy  Smoking can cause your baby to be born too early or weigh less at birth  Quit smoking as soon as you think you might be pregnant  Ask your healthcare provider for information if you need help quitting  · Do not drink alcohol  Alcohol passes from your body to your baby through the placenta  It can affect your baby's brain development and cause fetal alcohol syndrome (FAS)  FAS is a group of conditions that causes mental, behavior, and growth problems  · Talk to your healthcare provider before you take any medicines  Many medicines may harm your baby if you take them when you are pregnant  Do not take any medicines, vitamins, herbs, or supplements without first talking to your healthcare provider  Never use illegal or street drugs (such as marijuana or cocaine) while you are pregnant  Safety tips during pregnancy:   · Avoid hot tubs and saunas  Do not use a hot tub or sauna while you are pregnant, especially during your first trimester  Hot tubs and saunas may raise your baby's temperature and increase the risk of birth defects  · Avoid toxoplasmosis  This is an infection caused by eating raw meat or being around infected cat feces  It can cause birth defects, miscarriages, and other problems  Wash your hands after you touch raw meat  Make sure any meat is well-cooked before you eat it  Avoid raw eggs and unpasteurized milk  Use gloves or ask someone else to clean your cat's litter box while you are pregnant      Changes that are happening with your baby:  By 10 weeks, your baby will be about 2½ inches long from the top of the head to the rump (baby's bottom)  Your baby weighs about ½ ounce  Major body organs, such as the brain, heart, and lungs, are forming  Your baby's facial features are also starting to form  Prenatal care:  Prenatal care is a series of visits with your healthcare provider throughout your pregnancy  During the first 28 weeks of your pregnancy, you will see your healthcare provider 1 time each month  Prenatal care can help prevent problems during pregnancy and childbirth  Your healthcare provider will check your blood pressure and weight  Your baby's heart rate will also be checked  You may also need the following at some visits:  · A pelvic exam  allows your healthcare provider to see your cervix (the bottom part of your uterus)  Your healthcare provider will use a speculum to open your vagina  He or she will check the size and shape of your uterus  You may also have a Pap smear at your first prenatal visit  This is a test to check your cervix for abnormal cells  · Blood tests  may be done to check for any of the following:     ? Gestational diabetes or anemia (low iron level)    ? Blood type or Rh factor, or certain birth defects    ? Immunity to certain diseases, such as chickenpox or rubella    ? An infection, such as a sexually transmitted infection, HIV, or hepatitis B    · Hepatitis B  may need to be prevented or treated  Hepatitis B is inflammation of the liver caused by the hepatitis B virus (HBV)  HBV can spread from a mother to her baby during delivery  You will be checked for HBV as early as possible in the first trimester of each pregnancy  You need the test even if you received the hepatitis B vaccine or were tested before  You may need to have an HBV infection treated before you give birth  · Urine tests  may also be done to check for sugar and protein  These can be signs of gestational diabetes or preeclampsia   Urine tests may also be done to check for signs of infection  · A fetal ultrasound  shows pictures of your baby inside your uterus  The pictures are used to check your baby's development, movement, and position  · Genetic disorder screening tests  may be offered to you  These screening tests check your baby's risk for genetic disorders such as Down syndrome  A screening test includes a blood test and ultrasound  Follow up with your doctor or obstetrician as directed:  Go to all prenatal visits  Write down your questions so you remember to ask them during your visits  © Copyright [address] Information is for End User's use only and may not be sold, redistributed or otherwise used for commercial purposes  All illustrations and images included in CareNotes® are the copyrighted property of A D A M , Inc  or [address] [place], [place] [address]   The above information is an  only  It is not intended as medical advice for individual conditions or treatments  Talk to your doctor, nurse or pharmacist before following any medical regimen to see if it is safe and effective for you  Pregnancy at 11 to 14 Weeks   AMBULATORY CARE:   Changes happening to your body: You are now at the end of your first trimester and entering your second trimester  Morning sickness usually goes away by this time  You may have other symptoms such as fatigue, frequent urination, and headaches  You may have gained 2 to 4 pounds by now  Seek care immediately if:   · You have pain or cramping in your abdomen or low back  · You have heavy vaginal bleeding or clotting  · You pass material that looks like tissue or large clots  Collect the material and bring it with you  Call your doctor or obstetrician if:   · You cannot keep food or drinks down, and you are losing weight  · You have light vaginal bleeding  · You have chills or a fever  · You have vaginal itching, burning, or pain  · You have yellow, green, white, or foul-smelling vaginal discharge      · You have pain or burning when you urinate, less urine than usual, or pink or bloody urine  · You have questions or concerns about your condition or care  How to care for yourself at this stage of your pregnancy:   · Get plenty of rest   You may feel more tired than normal  You may need to take naps or go to bed earlier  · Manage nausea and vomiting  Avoid fatty and spicy foods  Eat small meals throughout the day instead of large meals  Lana may help to decrease nausea  Ask your healthcare provider about other ways of decreasing nausea and vomiting  · Eat a variety of healthy foods  Healthy foods include fruits, vegetables, whole-grain breads, low-fat dairy foods, beans, lean meats, and fish  Drink liquids as directed  Ask how much liquid to drink each day and which liquids are best for you  Limit caffeine to less than 200 milligrams each day  Limit your intake of fish to 2 servings each week  Choose fish low in mercury such as canned light tuna, shrimp, salmon, cod, or tilapia  Do not  eat fish high in mercury such as swordfish, tilefish, kwaku mackerel, and shark  · Take prenatal vitamins as directed  Your need for certain vitamins and minerals, such as folic acid, increases during pregnancy  Prenatal vitamins provide some of the extra vitamins and minerals you need  Prenatal vitamins may also help to decrease the risk of certain birth defects  · Do not smoke  Smoking increases your risk of a miscarriage and other health problems during your pregnancy  Smoking can cause your baby to be born too early or weigh less at birth  Ask your healthcare provider for information if you need help quitting  · Do not drink alcohol  Alcohol passes from your body to your baby through the placenta  It can affect your baby's brain development and cause fetal alcohol syndrome (FAS)  FAS is a group of conditions that causes mental, behavior, and growth problems       · Talk to your healthcare provider before you take any medicines  Many medicines may harm your baby if you take them when you are pregnant  Do not take any medicines, vitamins, herbs, or supplements without first talking to your healthcare provider  Never use illegal or street drugs (such as marijuana or cocaine) while you are pregnant  Safety tips during pregnancy:   · Avoid hot tubs and saunas  Do not use a hot tub or sauna while you are pregnant, especially during your first trimester  Hot tubs and saunas may raise your baby's temperature and increase the risk of birth defects  · Avoid toxoplasmosis  This is an infection caused by eating raw meat or being around infected cat feces  It can cause birth defects, miscarriages, and other problems  Wash your hands after you touch raw meat  Make sure any meat is well-cooked before you eat it  Avoid raw eggs and unpasteurized milk  Use gloves or ask someone else to clean your cat's litter box while you are pregnant  Changes happening with your baby: Your baby has fully formed fingernails and toenails  Your baby's heartbeat can now be heard  Ask your healthcare provider if you can listen to your baby's heartbeat  By week 14, your baby is over 4 inches long from the top of the head to the rump (baby's bottom)  Your baby weighs over 3 ounces  Prenatal care:  Prenatal care is a series of visits with your healthcare provider throughout your pregnancy  During the first 28 weeks of your pregnancy, you will see your healthcare provider 1 time each month  Prenatal care can help prevent problems during pregnancy and childbirth  Your healthcare provider will check your blood pressure and weight  Your baby's heart rate will also be checked  You may also need the following at some visits:  · A pelvic exam  allows your healthcare provider to see your cervix (the bottom part of your uterus)  Your healthcare provider will use a speculum to open your vagina   He or she will check the size and shape of your uterus  · Blood tests  may be done to check for any of the following:     ? Gestational diabetes or anemia (low iron level)    ? Blood type or Rh factor, or certain birth defects    ? Immunity to certain diseases, such as chickenpox or rubella    ? An infection, such as a sexually transmitted infection, HIV, or hepatitis B    · Hepatitis B  may need to be prevented or treated  Hepatitis B is inflammation of the liver caused by the hepatitis B virus (HBV)  HBV can spread from a mother to her baby during delivery  You will be checked for HBV as early as possible in the first trimester of each pregnancy  You need the test even if you received the hepatitis B vaccine or were tested before  You may need to have an HBV infection treated before you give birth  · Urine tests  may also be done to check for sugar and protein  These can be signs of gestational diabetes or preeclampsia  Urine tests may also be done to check for signs of infection  · A fetal ultrasound  shows pictures of your baby inside your uterus  The pictures are used to check your baby's development, movement, and position  · Genetic disorder screening tests  may be offered to you  These tests check your baby's risk for genetic disorders such as Down syndrome  A screening test includes a blood test and ultrasound  Follow up with your doctor or obstetrician as directed:  Go to all prenatal visits  Write down your questions so you remember to ask them during your visits  © Copyright [address] Information is for End User's use only and may not be sold, redistributed or otherwise used for commercial purposes  All illustrations and images included in CareNotes® are the copyrighted property of A D A M , Inc  or [address] [place] [address] [place]   The above information is an  only  It is not intended as medical advice for individual conditions or treatments   Talk to your doctor, nurse or pharmacist before following any medical regimen to see if it is safe and effective for you

## 2021-01-04 ENCOUNTER — INITIAL PRENATAL (OUTPATIENT)
Dept: OBGYN CLINIC | Facility: MEDICAL CENTER | Age: 36
End: 2021-01-04
Payer: COMMERCIAL

## 2021-01-04 ENCOUNTER — APPOINTMENT (OUTPATIENT)
Dept: LAB | Facility: MEDICAL CENTER | Age: 36
End: 2021-01-04
Payer: COMMERCIAL

## 2021-01-04 DIAGNOSIS — Z34.91 ENCOUNTER FOR PREGNANCY RELATED EXAMINATION IN FIRST TRIMESTER: ICD-10-CM

## 2021-01-04 DIAGNOSIS — Z34.91 ENCOUNTER FOR PREGNANCY RELATED EXAMINATION IN FIRST TRIMESTER: Primary | ICD-10-CM

## 2021-01-04 LAB
ABO GROUP BLD: NORMAL
BASOPHILS # BLD AUTO: 0.03 THOUSANDS/ΜL (ref 0–0.1)
BASOPHILS NFR BLD AUTO: 0 % (ref 0–1)
BILIRUB UR QL STRIP: NEGATIVE
BLD GP AB SCN SERPL QL: NEGATIVE
CLARITY UR: CLEAR
COLOR UR: NORMAL
EOSINOPHIL # BLD AUTO: 0.06 THOUSAND/ΜL (ref 0–0.61)
EOSINOPHIL NFR BLD AUTO: 1 % (ref 0–6)
ERYTHROCYTE [DISTWIDTH] IN BLOOD BY AUTOMATED COUNT: 13.8 % (ref 11.6–15.1)
GLUCOSE UR STRIP-MCNC: NEGATIVE MG/DL
HBV SURFACE AG SER QL: NORMAL
HCT VFR BLD AUTO: 38.2 % (ref 34.8–46.1)
HGB BLD-MCNC: 12.9 G/DL (ref 11.5–15.4)
HGB UR QL STRIP.AUTO: NEGATIVE
IMM GRANULOCYTES # BLD AUTO: 0.05 THOUSAND/UL (ref 0–0.2)
IMM GRANULOCYTES NFR BLD AUTO: 1 % (ref 0–2)
KETONES UR STRIP-MCNC: NEGATIVE MG/DL
LEUKOCYTE ESTERASE UR QL STRIP: NEGATIVE
LYMPHOCYTES # BLD AUTO: 2.48 THOUSANDS/ΜL (ref 0.6–4.47)
LYMPHOCYTES NFR BLD AUTO: 23 % (ref 14–44)
MCH RBC QN AUTO: 27.6 PG (ref 26.8–34.3)
MCHC RBC AUTO-ENTMCNC: 33.8 G/DL (ref 31.4–37.4)
MCV RBC AUTO: 82 FL (ref 82–98)
MONOCYTES # BLD AUTO: 0.46 THOUSAND/ΜL (ref 0.17–1.22)
MONOCYTES NFR BLD AUTO: 4 % (ref 4–12)
NEUTROPHILS # BLD AUTO: 7.63 THOUSANDS/ΜL (ref 1.85–7.62)
NEUTS SEG NFR BLD AUTO: 71 % (ref 43–75)
NITRITE UR QL STRIP: NEGATIVE
NRBC BLD AUTO-RTO: 0 /100 WBCS
PH UR STRIP.AUTO: 6 [PH]
PLATELET # BLD AUTO: 353 THOUSANDS/UL (ref 149–390)
PMV BLD AUTO: 9.7 FL (ref 8.9–12.7)
PROT UR STRIP-MCNC: NEGATIVE MG/DL
RBC # BLD AUTO: 4.68 MILLION/UL (ref 3.81–5.12)
RH BLD: POSITIVE
RUBV IGG SERPL IA-ACNC: >175 IU/ML
SP GR UR STRIP.AUTO: 1.02 (ref 1–1.03)
SPECIMEN EXPIRATION DATE: NORMAL
UROBILINOGEN UR QL STRIP.AUTO: 1 E.U./DL
WBC # BLD AUTO: 10.71 THOUSAND/UL (ref 4.31–10.16)

## 2021-01-04 PROCEDURE — 87086 URINE CULTURE/COLONY COUNT: CPT

## 2021-01-04 PROCEDURE — 36415 COLL VENOUS BLD VENIPUNCTURE: CPT

## 2021-01-04 PROCEDURE — 81003 URINALYSIS AUTO W/O SCOPE: CPT

## 2021-01-04 PROCEDURE — 80081 OBSTETRIC PANEL INC HIV TSTG: CPT

## 2021-01-04 PROCEDURE — 99211 OFF/OP EST MAY X REQ PHY/QHP: CPT

## 2021-01-04 NOTE — PROGRESS NOTES
OB INTAKE INTERVIEW      Pt presents for OB intake  Weight ovptb=264 pounds      OB History    Para Term  AB Living   2 1 1 0 0 1   SAB TAB Ectopic Multiple Live Births         0 1      # Outcome Date GA Lbr Wiliam/2nd Weight Sex Delivery Anes PTL Lv   2 Current            1 Term 10/29/18 40w5d / 01:59 3714 g (8 lb 3 oz) M Vag-Spont EPI N DESTIN         Hx of  delivery prior to 36 weeks 6 days:  NO     Last Menstrual Period:   Patient's last menstrual period was 10/25/2020 (exact date)  Ultrasound date:   2020 7 weeks 6 days  Estimated date of del/adelia:   Estimated Date of Delivery: 2021  confirmed by LMP? History of Diabetes: denies  History of Hypertension: denies      Infection Screening: Does the pt have a hx of MRSA? denies    H&P visit scheduled  ?  Interview education  Information on [facility] Pregnancy Essentials reviewed  Handouts given: How to Access Pregnancy Essentials Guide  Baby and Me support center  [facility] [place] Racheal Martino in Pregnancy information sheet   COVID-19: precautions and travel restrictions reviewed    Interview education    [facility]  Discussed genetic testing-    - referral to MFM given  Referral to genetic counselor given    - Information on SMA carrier screening reviewed  CF carrier screening completed with previous pregnancy-result is negative    Discussed Tdap and Influenza vaccines  Declined influenza vaccine today         Depression Screening Follow-up Plan: Patient's depression screening was Negative  with an Constantine score of  2                  The patient was oriented to our practice and all questions were answered    Interviewed by: Chele Galdamez RN 21

## 2021-01-05 LAB
BACTERIA UR CULT: NORMAL
HIV 1+2 AB+HIV1 P24 AG SERPL QL IA: NORMAL
RPR SER QL: NORMAL

## 2021-01-07 ENCOUNTER — TELEPHONE (OUTPATIENT)
Dept: PERINATAL CARE | Facility: CLINIC | Age: 36
End: 2021-01-07

## 2021-01-19 ENCOUNTER — TELEPHONE (OUTPATIENT)
Dept: OBGYN CLINIC | Facility: MEDICAL CENTER | Age: 36
End: 2021-01-19

## 2021-01-19 NOTE — TELEPHONE ENCOUNTER
Pt called in needs proof of pregnancy letter faxed to [phone]  Faxed letter and received confirmation fax was successful

## 2021-01-21 NOTE — PROGRESS NOTES
Tony Barnett is a 37y o  year old  at [facility] for first prenatal visit  Pregnancy was desired  She is currently taking PNV    Nausea No Vomiting No feels well  Exam done today - see OB flowsheet  Pap done No - 2020- ASCUS + HPV normal Colpo   Gonorrhea and Chlamydia sent  Labs reviewed  - normal   Genetic testing wants it and has apt for genetics on  and Sonogram     Long discussion  about the NIPT - she already called insurance and wants that done  Her brother had congential heart defect will most likely need echo    Has apt for genetics and sonogram already     CF was tested - negative  SMA - ordered today she checked with insurance and is covered  Pt has been counseled re diet, exercise, weight gain, foods to avoid, vaccines in pregnancy, trisomy screening, travel precautions to include seat belt use and VTE risk reduction  She has been provided our pregnancy packet which includes how and when to contact providers, medication recommendations, dietary suggestions, breastfeeding information as well as websites for additional information, hospital and delivery concerns

## 2021-01-22 ENCOUNTER — OFFICE VISIT (OUTPATIENT)
Dept: FAMILY MEDICINE CLINIC | Facility: CLINIC | Age: 36
End: 2021-01-22
Payer: COMMERCIAL

## 2021-01-22 ENCOUNTER — INITIAL PRENATAL (OUTPATIENT)
Dept: OBGYN CLINIC | Facility: MEDICAL CENTER | Age: 36
End: 2021-01-22
Payer: COMMERCIAL

## 2021-01-22 ENCOUNTER — TELEPHONE (OUTPATIENT)
Dept: PERINATAL CARE | Facility: CLINIC | Age: 36
End: 2021-01-22

## 2021-01-22 VITALS
RESPIRATION RATE: 14 BRPM | TEMPERATURE: 97.5 F | BODY MASS INDEX: 34.6 KG/M2 | HEIGHT: 62 IN | WEIGHT: 188 LBS | SYSTOLIC BLOOD PRESSURE: 116 MMHG | DIASTOLIC BLOOD PRESSURE: 70 MMHG | HEART RATE: 84 BPM | OXYGEN SATURATION: 99 %

## 2021-01-22 VITALS — BODY MASS INDEX: 34.57 KG/M2 | SYSTOLIC BLOOD PRESSURE: 102 MMHG | WEIGHT: 189 LBS | DIASTOLIC BLOOD PRESSURE: 70 MMHG

## 2021-01-22 DIAGNOSIS — R21 RASH: ICD-10-CM

## 2021-01-22 DIAGNOSIS — Z3A.12 12 WEEKS GESTATION OF PREGNANCY: Primary | ICD-10-CM

## 2021-01-22 DIAGNOSIS — R79.89 LOW VITAMIN D LEVEL: ICD-10-CM

## 2021-01-22 DIAGNOSIS — R10.30 LOWER ABDOMINAL PAIN: ICD-10-CM

## 2021-01-22 DIAGNOSIS — Z3A.12 12 WEEKS GESTATION OF PREGNANCY: ICD-10-CM

## 2021-01-22 DIAGNOSIS — E53.8 LOW VITAMIN B12 LEVEL: ICD-10-CM

## 2021-01-22 DIAGNOSIS — E04.1 THYROID NODULE: Primary | ICD-10-CM

## 2021-01-22 LAB
SL AMB  POCT GLUCOSE, UA: 50
SL AMB LEUKOCYTE ESTERASE,UA: NORMAL
SL AMB POCT BILIRUBIN,UA: NORMAL
SL AMB POCT BLOOD,UA: NORMAL
SL AMB POCT CLARITY,UA: CLEAR
SL AMB POCT COLOR,UA: NORMAL
SL AMB POCT KETONES,UA: NORMAL
SL AMB POCT NITRITE,UA: NORMAL
SL AMB POCT PH,UA: 7
SL AMB POCT SPECIFIC GRAVITY,UA: 1.02
SL AMB POCT URINE PROTEIN: NORMAL
SL AMB POCT UROBILINOGEN: 1

## 2021-01-22 PROCEDURE — 81002 URINALYSIS NONAUTO W/O SCOPE: CPT | Performed by: INTERNAL MEDICINE

## 2021-01-22 PROCEDURE — 99214 OFFICE O/P EST MOD 30 MIN: CPT | Performed by: INTERNAL MEDICINE

## 2021-01-22 PROCEDURE — 87591 N.GONORRHOEAE DNA AMP PROB: CPT | Performed by: OBSTETRICS & GYNECOLOGY

## 2021-01-22 PROCEDURE — 87491 CHLMYD TRACH DNA AMP PROBE: CPT | Performed by: OBSTETRICS & GYNECOLOGY

## 2021-01-22 PROCEDURE — 99214 OFFICE O/P EST MOD 30 MIN: CPT | Performed by: OBSTETRICS & GYNECOLOGY

## 2021-01-22 RX ORDER — ERGOCALCIFEROL 1.25 MG/1
50000 CAPSULE ORAL WEEKLY
Qty: 13 CAPSULE | Refills: 1 | Status: SHIPPED | OUTPATIENT
Start: 2021-01-22 | End: 2021-04-23 | Stop reason: SDUPTHER

## 2021-01-22 RX ORDER — TRIAMCINOLONE ACETONIDE 1 MG/G
CREAM TOPICAL 2 TIMES DAILY
Qty: 30 G | Refills: 1 | Status: SHIPPED | OUTPATIENT
Start: 2021-01-22 | End: 2021-03-31

## 2021-01-22 RX ORDER — FOLIC ACID 1 MG/1
1 TABLET ORAL DAILY
Qty: 90 TABLET | Refills: 1 | Status: SHIPPED | OUTPATIENT
Start: 2021-01-22 | End: 2021-04-23 | Stop reason: SDUPTHER

## 2021-01-22 RX ORDER — LANOLIN ALCOHOL/MO/W.PET/CERES
1000 CREAM (GRAM) TOPICAL DAILY
Qty: 90 TABLET | Refills: 3 | Status: SHIPPED | OUTPATIENT
Start: 2021-01-22 | End: 2021-03-31

## 2021-01-22 NOTE — TELEPHONE ENCOUNTER
Called patient to confirm Maternal Fetal Medicine Virtual appointment scheduled for 1/25/21  8:00  AUDREY PEREZ  Confirmed she received e-mail and has successfully downloaded the [address]  Explained procedure for virtual visit and requested she log into appointment approximately 10 minutes prior to scheduled start time  Explained the M Dr  will join her at the scheduled appointment time  Patient instructed to call [facility] office @ #[phone] for technical support issues using [employer] TEAMS  Patient verbalized understanding and has no questions at time

## 2021-01-22 NOTE — PROGRESS NOTES
Assessment/Plan:         Diagnoses and all orders for this visit:    Thyroid nodule; RTC in 3 mos  w:  -     TSH, 3rd generation; Future  -     T4, free; Future  -     Thyroid Antibodies Panel; Future  Yearly US Thyroid    Low vitamin B12 level; start :  -     vitamin B-12 (VITAMIN B-12) 1,000 mcg tablet; Take 1 tablet (1,000 mcg total) by mouth daily  -     folic acid (FOLVITE) 1 mg tablet; Take 1 tablet (1 mg total) by mouth daily    Low vitamin D level;  -     ergocalciferol (VITAMIN D2) 50,000 units; Take 1 capsule (50,000 Units total) by mouth once a week    Rash; Try :  -     triamcinolone (KENALOG) 0 1 % cream; Apply topically 2 (two) times a day    Lower abdominal pain  -     POCT urine dip    12 weeks gestation of pregnancy; FU w Obs/Gyn        Subjective:      Patient ID: Yannick Tran is a 37 y o  female  [address] [place] [address] is here for Regular check up, and has few symptoms, she is pregnant 12 weeks, recent Blood work, med list reviewed w pt,  The following portions of the patient's history were reviewed and updated as appropriate: allergies, current medications, past family history, past social history, past surgical history and problem list     Review of Systems   Constitutional: Positive for fatigue  Negative for chills and fever  HENT: Negative for congestion, facial swelling, sore throat, trouble swallowing and voice change  Eyes: Negative for pain, discharge and visual disturbance  Respiratory: Negative for cough, shortness of breath and wheezing  Cardiovascular: Negative for chest pain, palpitations and leg swelling  Gastrointestinal: Negative for abdominal pain, blood in stool, constipation, diarrhea and nausea  Endocrine: Negative for polydipsia, polyphagia and polyuria  Genitourinary: Negative for difficulty urinating, hematuria and urgency  Musculoskeletal: Negative for arthralgias and myalgias  Skin: Positive for rash     Neurological: Negative for dizziness, tremors, weakness and headaches  Hematological: Negative for adenopathy  Does not bruise/bleed easily  Psychiatric/Behavioral: Negative for dysphoric mood, sleep disturbance and suicidal ideas  Objective:      /70 (BP Location: Left arm, Patient Position: Sitting, Cuff Size: Standard)   Pulse 84   Temp 97 5 °F (36 4 °C) (Temporal)   Resp 14   Ht 5' 2" (1 575 m)   Wt 85 3 kg (188 lb)   LMP 10/25/2020 (Exact Date)   SpO2 99%   BMI 34 39 kg/m²          Physical Exam  Constitutional:       General: She is not in acute distress  HENT:      Head: Normocephalic  Mouth/Throat:      Pharynx: No oropharyngeal exudate  Eyes:      General: No scleral icterus  Conjunctiva/sclera: Conjunctivae normal       Pupils: Pupils are equal, round, and reactive to light  Neck:      Musculoskeletal: Neck supple  Thyroid: No thyromegaly  Cardiovascular:      Rate and Rhythm: Normal rate and regular rhythm  Heart sounds: Normal heart sounds  No murmur  Pulmonary:      Effort: Pulmonary effort is normal  No respiratory distress  Breath sounds: Normal breath sounds  No wheezing or rales  Abdominal:      General: Bowel sounds are normal  There is no distension  Palpations: Abdomen is soft  Tenderness: There is no abdominal tenderness  There is no guarding or rebound  Musculoskeletal:         General: No tenderness  Lymphadenopathy:      Cervical: No cervical adenopathy  Skin:     Coloration: Skin is not pale  Findings: Rash present  Neurological:      Mental Status: She is alert and oriented to person, place, and time  Sensory: No sensory deficit  Motor: No weakness

## 2021-01-25 ENCOUNTER — TELEMEDICINE (OUTPATIENT)
Dept: PERINATAL CARE | Facility: CLINIC | Age: 36
End: 2021-01-25

## 2021-01-25 ENCOUNTER — TELEPHONE (OUTPATIENT)
Dept: PERINATAL CARE | Facility: CLINIC | Age: 36
End: 2021-01-25

## 2021-01-25 DIAGNOSIS — Z36.9 UNSPECIFIED ANTENATAL SCREENING: ICD-10-CM

## 2021-01-25 DIAGNOSIS — Z31.5 ENCOUNTER FOR PROCREATIVE GENETIC COUNSELING: ICD-10-CM

## 2021-01-25 DIAGNOSIS — Z82.49 FAMILY HISTORY OF COMPLEX CONGENITAL HEART DISEASE: Primary | ICD-10-CM

## 2021-01-25 LAB
C TRACH DNA SPEC QL NAA+PROBE: NEGATIVE
N GONORRHOEA DNA SPEC QL NAA+PROBE: NEGATIVE

## 2021-01-25 PROCEDURE — NC001 PR NO CHARGE

## 2021-01-25 NOTE — TELEPHONE ENCOUNTER
Spoke with patient and confirmed appointment with MFM  1 support person ( must be over age of 15) may accompany patient  Will you and your support person be able to wear a mask ,without a valve , during entire appointment? YES   To minimize your exposure in our waiting area,check in and rooming questions will be done via phone  When you arrive in the parking lot please call the following inside line # prior to entering office:    [place]: [phone]    Have you or your support person traveled outside the state in the last 2 weeks? NO  If yes, what state did you travel to? Do you or your support person have:  Fever or flu- like symptoms? NO  Symptoms of upper respiratory infection like runny nose, sore throat or cough? NO  Do you have new headache that you have not had in the past?NO  Have you experienced any new shortness of breath recently? NO  Do you have any new loss of taste or smell? NO  Do you have any new diarrhea, nausea or vomiting? NO  Have you recently been in contact with anyone who has been sick or diagnosed with COVID-19 infection? NO  Have you been recommended to quarantine because of an exposure to a confirmed positive COVID19 person? NO  Have you recently been tested for COVID19? NO    Patient verbalized understanding of all instructions

## 2021-01-27 ENCOUNTER — ROUTINE PRENATAL (OUTPATIENT)
Dept: PERINATAL CARE | Facility: CLINIC | Age: 36
End: 2021-01-27
Payer: COMMERCIAL

## 2021-01-27 VITALS
HEIGHT: 62 IN | DIASTOLIC BLOOD PRESSURE: 60 MMHG | SYSTOLIC BLOOD PRESSURE: 131 MMHG | BODY MASS INDEX: 34.63 KG/M2 | WEIGHT: 188.2 LBS | HEART RATE: 97 BPM

## 2021-01-27 DIAGNOSIS — Z36.82 ENCOUNTER FOR ANTENATAL SCREENING FOR NUCHAL TRANSLUCENCY: ICD-10-CM

## 2021-01-27 DIAGNOSIS — O99.210 OBESITY AFFECTING PREGNANCY, ANTEPARTUM: Primary | ICD-10-CM

## 2021-01-27 PROBLEM — Z34.03 ENCOUNTER FOR SUPERVISION OF NORMAL FIRST PREGNANCY IN THIRD TRIMESTER: Status: RESOLVED | Noted: 2018-09-13 | Resolved: 2021-01-27

## 2021-01-27 PROBLEM — Z3A.40 40 WEEKS GESTATION OF PREGNANCY: Status: RESOLVED | Noted: 2018-09-04 | Resolved: 2021-01-27

## 2021-01-27 PROCEDURE — 99213 OFFICE O/P EST LOW 20 MIN: CPT | Performed by: OBSTETRICS & GYNECOLOGY

## 2021-01-27 PROCEDURE — 76813 OB US NUCHAL MEAS 1 GEST: CPT | Performed by: OBSTETRICS & GYNECOLOGY

## 2021-01-27 NOTE — PATIENT INSTRUCTIONS
Thank you for choosing us for your  care today  If you have any questions about your ultrasound or care, please do not hesitate to contact us or your primary obstetrician  Some general instructions for your pregnancy are:     Protect against coronavirus: Pregnant women are increased risk of severe COVID  Continue to practice social distancing, wear a mask, and wash your hands often  Because of the increased risk of pregnancy, you are advised to not attend or host inperson gatherings with people who do not currently live inside your household - this includes birthday parties, gender reveals, baby showers, etc)  Notify your primary care doctor if you have any symptoms including cough, shortness of breath or difficulty breathing, fever, chills, muscle pain, sore throat, or loss of taste or smell  Pregnant women can receive the coronavirus vaccine   Exercise: Aim for 22 minutes per day (150 minutes per week) of regular exercise  Walking is great!  Nutrition: aim for calcium-rich and iron-rich foods as well as healthy sources of protein   Protect against the flu: get yourself and your entire household vaccinated against influenza  This will protect your baby   Learn about Preeclampsia: preeclampsia is a common, serious high blood pressure complication in pregnancy  A blood pressure of [id number] (systolic or top number) or [id number] (diastolic or bottom number) is not normal and needs evaluation by your doctor   If you smoke, try to reduce how many cigarettes you smoke or quit completely  Do not vape   Other warning signs to watch out for in pregnancy or postpartum: chest pain, obstructed breathing or shortness of breath, seizures, thoughts of hurting yourself or your baby, bleeding, a painful or swollen leg, fever, or headache (see [facility] POST-BIRTH Warning Signs campaign)  If these happen call 911    Itching is also not normal in pregnancy and if you experience this, especially over your hands and feet, potentially worse at night, notify your doctors   Lastly, if you are contacted regarding participation in a survey about your experience in our office, please know that we take any feedback you provide seriously and use it to improve how we deliver care through our center

## 2021-01-27 NOTE — PROGRESS NOTES
[id number] [address]: Ms Loraine Gallo was seen today at 13w3d for nuchal translucency ultrasound  See ultrasound report under "OB Procedures" tab  Please don't hesitate to contact our office with any concerns or questions  Landa Cabot, MD    MDM ([id number]):   I   Diagnoses: Low: One stable chronic illness (obesity)  II  Data: Limited: Ordering of each unique test (NIPT)  III    Risk: Low to moderate

## 2021-01-27 NOTE — PROGRESS NOTES
Patient given lab slip for Noninvasive Prenatal Screening, [facility]  Blood test is drawn at [facility] and online appointment scheduling and lab locations can be found @ www  [email or web address]  [email or web address]     Qntal  card given, patient instructed how to check her out- of -pocket responsibility @ [facility]  Patient made aware if [facility]  unable to give an estimate she will need to contact [facility] office prior to blood draw  Insurance may require prior authorization, if test drawn without prior authorization she will be responsible for full cost of test   Patient aware that  is provided by third party and is only an estimate of cost not a guarantee  For definitive cost, patient encouraged to call her insurance provider- insurance phone # located on the back of her ID card  Patient verbalized understanding of all instructions and no questions at this time

## 2021-01-27 NOTE — PROGRESS NOTES
Genetic Counseling   High-Risk Gestation Note    Appointment Date:  2021  Referred By: Newton Altamirano,*  YOB: 1985  Partner:  Lorrie Lemons  Indication for Visit:  personal and/or family history of malformation :  Congenital heart defect and prenatal testing and screening options  Pregnancy History:   Estimated Date of Delivery: 21  Estimated Gestational Age: 13w1d    Virtual Regular Visit      Assessment/Plan:    Problem List Items Addressed This Visit     None      Visit Diagnoses     Family history of complex congenital heart disease    -  Primary    Unspecified  screening        Encounter for procreative genetic counseling                   Reason for visit is   Chief Complaint   Patient presents with    Virtual Regular Visit        Encounter provider Sha Arreola    Provider located at [address] [place] [id number] [phone]      Recent Visits  Date Type Provider Dept   21 Telephone [facility] 21 Telephone Susana Alexander MD [address]   21 Office Visit Doug Lindsay MD [facility]   Showing recent visits within past 7 days and meeting all other requirements     Future Appointments  No visits were found meeting these conditions  Showing future appointments within next 150 days and meeting all other requirements        The patient was identified by name and date of birth  Jessica Lin was informed that this is a telemedicine visit and that the visit is being conducted through [employer] and patient was informed that this is a secure, HIPAA-compliant platform  She agrees to proceed     My office door was closed  No one else was in the room  She acknowledged consent and understanding of privacy and security of the video platform   The patient has agreed to participate and understands they can discontinue the visit at any time     Patient is aware this is a billable service  Ernie Rodrigues is a 37 y o  female who presented for genetic counseling to discuss prenatal testing and screening option, and a family history of cardiac defect  We reviewed that the risk of Down syndrome at age 35 at delivery is 1/535, and the risk for any chromosomal abnormality at this age is 1/286  The differences between full chromosome aneuploidies and copy number variants (microdeletions and microduplications) was also discussed  We reviewed that copy number variants occur in about 0 4% of all pregnancies  Therefore, for patients under age 39 the risk for copy number variants is higher than the risk for Down syndrome  The risks, benefits, and limitations of amniocentesis were discussed with the patient  Amniocentesis is performed under direct real time ultrasound visualization to avoid both the fetus and the placenta  Once amniotic fluid is withdrawn, laboratory analysis is performed and amniotic fluid alpha-fetoprotein, as well as chromosome and/or microarray analysis is undertaken  The risk of genetic amniocentesis includes, but is not limited to less than 1 in 300 pregnancy loss rate or  delivery rate if 23 weeks or greater, infection, bleeding, rupture of membranes, failure of cells to grow, karyotype error, laboratory error, etc   Occasionally a repeat amniocentesis is necessary due to cell culture failure  Chromosome/microarray analysis from amniocentesis is 99 9% accurate and alpha-fetoprotein analysis can detect approximately 95% of open neural tube defects  Chorionic villus sampling (CVS) is another diagnostic testing option that is available earlier than amniocentesis, between 10-14 weeks gestation  Like amniocentesis, CVS is 99% accurate for detecting chromosomal problems  Unlike amniocentesis, CVS cannot detect alpha-fetoprotein levels in order to determine the risk for open neural tube defects    MSAFP testing would need to be performed at 15-20 weeks gestation for this purpose  The risk of CVS includes, but is not limited to, less than a 1 in 300 risk for pregnancy loss  There is also a 1% risk for maternal cell contamination and cell culture failure, in which case the CVS would need to be followed-up with amniocentesis  We reviewed the testing option of cell free fetal DNA screening (also known as noninvasive prenatal testing or NIPT)  We discussed that it is a serum test to identify fragments of fetal DNA in maternal blood  We reviewed the benefits and limitations of cell free fetal DNA screening in detecting Down syndrome, Trisomy 13, Trisomy 25 and sex chromosome aneuploidies  We also discussed that cell free fetal DNA screening does not detect additional chromosomal abnormalities and the possibility of a failed test result  As cell free fetal DNA screening does not detect open neural tube defects, MSAFP screening is available at 15-20 weeks gestation  Sequential screening consists of first trimester measurement of nuchal translucency combined with first trimester biochemical analysis, as well as second trimester biochemical analysis  It is able to detect approximately 95% of pregnancies in which the fetus has Down syndrome, 90% of pregnancies in which the fetus has trisomy 25 and 80% of pregnancies which the fetus has an open neural tube defect  It can also indirectly identify other chromosomal abnormalities, copy number variants, genetic syndromes, or adverse pregnancy outcomes if serum analyte levels are abnormal     We discussed the availability of an ultrasound between 11-14 weeks gestation to measure the nuchal translucency (NT), which can assess for chromosome abnormalities, cardiac defects, and other adverse pregnancy outcomes  We reviewed that level II anatomy ultrasound is typically performed at approximately 20 weeks gestation    Level II ultrasound evaluation is between 60-80% accurate in detecting major physical birth defects and variations in fetal development that may be associated with chromosome abnormalities  Level II ultrasound evaluation is not able to detect all birth defects or health problems  After discussing the available prenatal screening and testing options Carina Fernandez elected to pursue cell free fetal DNA screening  She was informed that the results will disclose the fetal sex and will be available in her MyChart to review  A [facility] [id number] lab slip will be provided to the patient at her NT ultrasound to be drawn at the hospital lab or a [facility]   Results take approximately 7-10 days  The [id number] cost estimate information was also provided to the patient and she was encouraged to find out how much it would be prior to getting her blood drawn  The maximum out of pocket cost of $299 through the Every Mom Pledge program was also discussed with the patient  The patient declined CVS and amniocentesis secondary to procedural related complications  She may reconsider diagnostic testing should the cell free fetal DNA screening come back abnormal   Carina Fernandez is also planning on pursuing NT ultrasound, MSAFP screening and Level II ultrasound at the appropriate times  Histories for the patient and her partner's family were taken during our session  Carina Fernandez states that her brother was born with a "hole in the heart" and required surgical correction with one major surgery at [facility] and 2-3 other small surgeries  He does follow regularly with Cardiology  The patient was not aware of his exact diagnosis, thus we discussed that there are multiple types of "hole in the heart" that someone may have including ventricular septal defect (VSD), and atrial septal defect (ASD)  He may also have had another more significant cardiac defect based on the number of surgeries he required    Without further information as to the type of defect her brother has an accurate risk to the pregnancy cannot be determined  It is likely to be elevated over the general population risk of 6-12/1000 live births  We reviewed the benefits and limitations of fetal echocardiogram at 22-24 weeks gestation which the patient elected to pursue  Itz's sister had a fetal demise at about 8 months gestation where the fetus was found to be missing the corpus collosum  The patient was not aware if any genetic testing was performed  We discussed that agenesis of the corpus callosum (ACC) is among the most frequent human brain malformations with an incidence of 0 5-70 in 10,000  It has a multifactorial inheritance and has been associated with many genetic syndromes and chromosome abnormalities  Without further information as to potential etiology an accurate risk to the current pregnancy cannot be determined  We reviewed the benefits and limitations of ultrasound in detecting brain malformations  Further review of family history for the patient and her partner was noncontributory  The family history was not significant for other genetic diseases or disorders, intellectual disability, birth defects, fetal loss, or consanguinity  Patient reports both her and her  being of [place] decent  She denies either of them having known Ashkenazi Anabaptist ancestry  The patient had cystic fibrosis carrier screening preformed previously and was found to be negative for 149 mutations  Spinal muscular atrophy carrier screening was ordered by the patient's OB which she is planning on having done  The benefits and limitations of hemoglobinopathy, Fragile X, and expanded carrier screening was discussed  The patient declined Fragile X and expanded carrier screening  Hemoglobin electrophoresis to screen for hemoglobinopathies is recommended through her referring OB provider if not previously performed      Lastly, we discussed the fact that everyone in the general population regardless of age, family history, or medical background has approximately a 3-5% risk of having a child with some type of congenital anomaly, genetic disease or intellectual disability  Currently there are no tests available to rule out all birth defects or health problems  Benito Quiroz was provided with our contact information  I encouraged her to call with any questions or concerns  Plan/Tests Ordered:  1) Patient declined CVS, amniocentesis, Sequential screen, Fragile X and expanded carrier screening  2) Patient elected NIPT - [id number] labslip to be provided at NT ultrasound  3) NT ultrasound scheduled for 1/27/21  4) MSAFP screening at 15-20 weeks gestation  5) Level II anatomy ultrasound at approximately 20 weeks gestation  HPI     Past Medical History:   Diagnosis Date    Abnormal Pap smear of cervix     COVID-19     Disease of thyroid gland     h/o thyroid nodules    HPV (human papilloma virus) infection     Migraine     Urinary tract infection     utix1 in past    Varicella     positive history       Past Surgical History:   Procedure Laterality Date    COLPOSCOPY      TONSILLECTOMY      WISDOM TOOTH EXTRACTION         Current Outpatient Medications   Medication Sig Dispense Refill    ergocalciferol (VITAMIN D2) 50,000 units Take 1 capsule (50,000 Units total) by mouth once a week 13 capsule 1    folic acid (FOLVITE) 1 mg tablet Take 1 tablet (1 mg total) by mouth daily 90 tablet 1    Prenatal Vit-DSS-Fe Cbn-FA (PRENATAL AD PO) Take 1 tablet by mouth daily      triamcinolone (KENALOG) 0 1 % cream Apply topically 2 (two) times a day 30 g 1    vitamin B-12 (VITAMIN B-12) 1,000 mcg tablet Take 1 tablet (1,000 mcg total) by mouth daily 90 tablet 3     No current facility-administered medications for this visit  No Known Allergies    Review of Systems    Video Exam    There were no vitals filed for this visit      Physical Exam     I spent 60 minutes directly with the patient during this visit      VIRTUAL VISIT DISCLAIMER    Bobby Larry acknowledges that she has consented to an online visit or consultation  She understands that the online visit is based solely on information provided by her, and that, in the absence of a face-to-face physical evaluation by the physician, the diagnosis she receives is both limited and provisional in terms of accuracy and completeness  This is not intended to replace a full medical face-to-face evaluation by the physician  Bobby Larry understands and accepts these terms

## 2021-01-27 NOTE — LETTER
January 27, 2021     SADIA Marrufo  [address]  [address] [facility]    Patient: Isak Mack   YOB: 1985   Date of Visit: 1/27/2021       Dear Dr Dilshad Mulligan: Thank you for referring Isak Mack to me for evaluation  Below are my notes for this consultation  If you have questions, please do not hesitate to call me  I look forward to following your patient along with you  Sincerely,        Lupe Vasquez MD        CC: No Recipients  Lupe Vasquez MD  1/27/2021 12:07 PM  Sign when Signing Visit  [address] [place] [address] Road: Ms Lani Flores was seen today at 13w3d for nuchal translucency ultrasound  See ultrasound report under "OB Procedures" tab  Please don't hesitate to contact our office with any concerns or questions    Lupe Vasquez MD

## 2021-02-01 LAB
# FETUSES US: 1
CFDNA.FET/TOTAL PLAS.CFDNA: NORMAL
FET CHR 13 TS PLAS.CFDNA QL: NEGATIVE
FET CHR 18 TS PLAS.CFDNA QL: NEGATIVE
FET CHR 21 TS PLAS.CFDNA QL: NEGATIVE
FET CHR X + Y ANEUP PLAS.CFDNA QL: NORMAL
FET CHROM X + Y ANEUP CFDNA IMP: NORMAL
FET Y CHROM PLAS.CFDNA QL: DETECTED
FET Y CHROM PLAS.CFDNA: NORMAL
GA (DAYS): 3 D
GA (WEEKS): 13 WK
MICRODELETION SYND BLD/T FISH: NORMAL
REF LAB TEST METHOD: NORMAL
SERVICE CMNT-IMP: NORMAL
SERVICE CMNT-IMP: NORMAL
SL AMB ABNORMAL MSS?: NORMAL
SL AMB ABNORMAL US?: NORMAL
SL AMB ADVANCED MATERNAL AGE?: NORMAL
SL AMB MICRODELETION INTERP: NORMAL
SL AMB MICRODELETION: NOT DETECTED
SL AMB PERSONAL/FAM HISTORY?: NORMAL
SL AMB SPECIFICATIONS: NORMAL

## 2021-02-04 ENCOUNTER — OFFICE VISIT (OUTPATIENT)
Dept: DERMATOLOGY | Facility: CLINIC | Age: 36
End: 2021-02-04
Payer: COMMERCIAL

## 2021-02-04 VITALS — HEIGHT: 62 IN | TEMPERATURE: 98.4 F | WEIGHT: 187 LBS | BODY MASS INDEX: 34.41 KG/M2

## 2021-02-04 DIAGNOSIS — L20.89 OTHER ATOPIC DERMATITIS: Primary | ICD-10-CM

## 2021-02-04 PROCEDURE — 99203 OFFICE O/P NEW LOW 30 MIN: CPT | Performed by: DERMATOLOGY

## 2021-02-04 NOTE — PROGRESS NOTES
[facility] Note     Patient Name: Elina Earl  Encounter Date: 2/4/2021     Have you been cared for by a [facility] in the last 3 years and, if so, which one? No    · Have you traveled outside of the [address] [place] [address] in the past 3 months or outside of the [facility] area in the last 2 weeks? No     May we call your Preferred Phone number to discuss your specific medical information? Yes     May we leave a detailed message that includes your specific medical information? Yes      Today's Chief Concerns:   Concern #1:  Rash on arms and hands     Past Medical History:  Have you personally ever had or currently have any of the following? · Skin cancer (such as Melanoma, Basal Cell Carcinoma, Squamous Cell Carcinoma? (If Yes, please provide more detail)- No  · Eczema: No  · Psoriasis: No  · HIV/AIDS: No  · Hepatitis B or C: No  · Tuberculosis: No  · Systemic Immunosuppression such as Diabetes, Biologic or Immunotherapy, Chemotherapy, Organ Transplantation, Bone Marrow Transplantation (If YES, please provide more detail): No  · Radiation Treatment (If YES, please provide more detail): No  · Any other major medical conditions/concerns? (If Yes, which types)- No    Social History:     What is/was your primary occupation? Unemployed     What are your hobbies/past-times? Family History:  Have any of your "first degree relatives" (parent, brother, sister, or child) had any of the following       · Skin cancer such as Melanoma or Merkel Cell Carcinoma or Pancreatic Cancer? No  · Eczema, Asthma, Hay Fever or Seasonal Allergies: No  · Psoriasis or Psoriatic Arthritis: No  · Do any other medical conditions seem to run in your family? If Yes, what condition and which relatives?   No    Current Medications:   (please update all dermatological medications before printing patient's AVS!)      Current Outpatient Medications:     ergocalciferol (VITAMIN D2) 50,000 units, Take 1 capsule (50,000 Units total) by mouth once a week, Disp: 13 capsule, Rfl: 1    folic acid (FOLVITE) 1 mg tablet, Take 1 tablet (1 mg total) by mouth daily, Disp: 90 tablet, Rfl: 1    Prenatal Vit-DSS-Fe Cbn-FA (PRENATAL AD PO), Take 1 tablet by mouth daily, Disp: , Rfl:     vitamin B-12 (VITAMIN B-12) 1,000 mcg tablet, Take 1 tablet (1,000 mcg total) by mouth daily, Disp: 90 tablet, Rfl: 3    triamcinolone (KENALOG) 0 1 % cream, Apply topically 2 (two) times a day (Patient not taking: Reported on 2/4/2021), Disp: 30 g, Rfl: 1      Review of Systems:  Have you recently had or currently have any of the following? If YES, what are you doing for the problem? · Fever, chills or unintended weight loss: No  · Sudden loss or change in your vision: No  · Nausea, vomiting or blood in your stool: No  · Painful or swollen joints: No  · Wheezing or cough: No  · Changing mole or non-healing wound: No  · Nosebleeds: No  · Excessive sweating: No  · Easy or prolonged bleeding? No  · Over the last 2 weeks, how often have you been bothered by the following problems? · Taking little interest or pleasure in doing things: 1 - Not at All  · Feeling down, depressed, or hopeless: 1 - Not at All  · Rapid heartbeat with epinephrine:  No    · FEMALES ONLY:    · Are you pregnant or planning to become pregnant? YES  · Are you currently or planning to be nursing or breast feeding? YES    · Any known allergies? · No Known Allergies      Physical Exam:     Was a chaperone (Derm Clinical Assistant) present throughout the entire Physical Exam? Yes     Did the Dermatology Team specifically  the patient on the importance of a Full Skin Exam to be sure that nothing is missed clinically?  Yes}  o Did the patient ultimately request or accept a Full Skin Exam?  NO  o Did the patient specifically refuse to have the areas "under-the-bra" examined by the Dermatologist? No  o Did the patient specifically refuse to have the areas "under-the-underwear" examined by the Dermatologist? No    CONSTITUTIONAL:   Vitals:    02/04/21 1505   Temp: 98 4 °F (36 9 °C)   Weight: 84 8 kg (187 lb)   Height: 5' 2" (1 575 m)       PSYCH: Normal mood and affect  EYES: Normal conjunctiva  ENT: Normal lips and oral mucosa  CARDIOVASCULAR: No edema  RESPIRATORY: Normal respirations  HEME/LYMPH/IMMUNO:  No regional lymphadenopathy except as noted below in "ASSESSMENT AND PLAN BY DIAGNOSIS"    SKIN:  FULL ORGAN SYSTEM EXAM   Ears, Face Normal except as noted below in Assessment   Right Arm/Hand/Fingers Normal except as noted below in Assessment   Left Arm/Hand/Fingers Normal except as noted below in Assessment   Chest/Breasts/Axillae Viewed areas Normal except as noted below in Assessment        Assessment and Plan by Diagnosis:    History of Present Condition:     Duration:  How long has this been an issue for you?    o  Months   Location Affected:  Where on the body is this affecting you?    o  Bilateral arms, hands   Quality:  Is there any bleeding, pain, itch, burning/irritation, or redness associated with the skin lesion?    o  Pain, itch   Severity:  Describe any bleeding, pain, itch, burning/irritation, or redness on a scale of 1 to 10 (with 10 being the worst)  o  6-9   Timing:  Does this condition seem to be there pretty constantly or do you notice it more at specific times throughout the day?    o  Constant, flares more at night   Context:  Have you ever noticed that this condition seems to be associated with specific activities you do?    o  Denies   Modifying Factors:    o Anything that seems to make the condition worse?    -  Denies  o What have you tried to do to make the condition better? -  Hydrocortisone, Aquaphor   Associated Signs and Symptoms:  Does this skin lesion seem to be associated with any of the following:  o  SL AMB DERM SIGNS AND SYMPTOMS: Itching and Scratching      1   ATOPIC DERMATITIS    Physical Exam:   Anatomic Location Affected:  Bilateral forearms, finger tips   Morphological Description:  Scaly erythematous plaques, the skin by the fingertips is erythematous and desquamating, some cuticles are missing    Severity: mild   Pertinent Positives:   Pertinent Negatives: No Lymphadenopathy      Assessment and Plan:  Based on a thorough discussion of this condition and the management approach to it (including a comprehensive discussion of the known risks, side effects and potential benefits of treatment), the patient (family) agrees to implement the following specific plan:   Apply Triamcinolone 0 1% cream topically to affected areas to bilateral forearms and finger tips twice a day until clear   After applying Triamcinolone to hands wear gloves on them for at least an hour   Once rash is gone use a thick moisturizing cream   No lotions, only creams    Cetaphil, Cerave, La Roche etc         2  FOLLICULITIS, MILD     Physical Exam:   Anatomic Location Affected:  Chest   Morphological Description:  Few erythematous papules on chest   Pertinent Positives:   Pertinent Negatives: No Lymphadenopathy      Assessment and Plan:  Based on a thorough discussion of this condition and the management approach to it (including a comprehensive discussion of the known risks, side effects and potential benefits of treatment), the patient (family) agrees to implement the following specific plan:   Reassure benign        Scribe Attestation    I,:  Mary Kay Gaffney am acting as a scribe while in the presence of the attending physician :       I,:  Kenrick Sanches MD personally performed the services described in this documentation    as scribed in my presence :

## 2021-02-04 NOTE — PATIENT INSTRUCTIONS
1  ATOPIC DERMATITIS ("childhood Eczema")      Assessment and Plan:  Based on a thorough discussion of this condition and the management approach to it (including a comprehensive discussion of the known risks, side effects and potential benefits of treatment), the patient (family) agrees to implement the following specific plan:   Apply Triamcinolone topically to affected areas to bilateral forearms and finger tips twice a day until clear   After applying Triamcinolone to hands wear gloves on them for at least an hour   Once rash is gone use a thick moisturizing cream   No lotions, only creams  Cetaphil, Cerave, La Roche etc      Assessment and Plan:   Atopic Dermatitis is a chronic, itchy skin condition that is very common in children but may occur at any age  It is also known as eczema or atopic eczema   It is the most common form of dermatitis  Atopic dermatitis usually occurs in people who have an atopic tendency    This means they may develop any or all of these closely linked conditions: Atopic dermatitis, asthma, hay fever (allergic rhinitis), eosinophilic esophagitis, and gastroenteritis  Often these conditions run within families with a parent, child or sibling also affected  A family history of asthma, eczema or hay fever is particularly useful in diagnosing atopic dermatitis in infants  Atopic dermatitis arises because of a complex interaction of genetic and environmental factors  These include defects in skin barrier function making the skin more susceptible to irritation by soap and other contact irritants, the weather, temperature and non-specific triggers  There is also an element of immune system dysregulation that is often present  By definition, it is chronic and has a "waxing-waning" nature; flares should be expected but with good education and treatment strategies can be minimized  Some specific tips we discussed:   Dry skin care     Usingonly mild cleansers (hypoallergenic and without fragrances) and fragrance free detergent (not unscented products which contain a masking agent); we discussed avoiding irritants/fragranced products   The importance of regular application of moisturizers daily (at least 3 times a day)   The known and theoretical side effects of steroids at length, including but not limited to atrophy of skin and increased pressure in eye (glaucoma) and clouding of the eye's lens (cataracts) if used in or around the eye for extended durations   The specific over-the-counter interventions and medications   Side effects, risks and benefits of topical and oral medications discussed   After lengthy discussion of etiology and treatment options, we decided to implement the following personalized treatment plan:      2  FOLLICULITIS      Assessment and Plan:  Based on a thorough discussion of this condition and the management approach to it (including a comprehensive discussion of the known risks, side effects and potential benefits of treatment), the patient (family) agrees to implement the following specific plan:   Reassure benign    What is folliculitis? Folliculitis is the name given to a group of skin conditions in which there are inflamed hair follicles  The result is a tender red spot, often with a surface pustule  Folliculitis may be superficial or deep  It can affect anywhere there are hairs, including chest, back, buttocks, arms and legs  Acne and its variants are also types of folliculitis  What causes folliculitis? Folliculitis can be due to infection, occlusion (blockage), irritation and various skin diseases

## 2021-02-08 ENCOUNTER — TELEPHONE (OUTPATIENT)
Dept: PERINATAL CARE | Facility: CLINIC | Age: 36
End: 2021-02-08

## 2021-02-08 NOTE — TELEPHONE ENCOUNTER
Pt called office and stated she received a message with results of Qnatal, but gender was NOT provided  PT instructed the gender is not left on VM as not every pt wants to know  Pt provided with gender and pt receptive and declines questions at this time

## 2021-02-08 NOTE — TELEPHONE ENCOUNTER
I [facility] a [facility] from [facility] on the nurse line regarding lab results for the NIPT  I called her back and left her a message to tell her that her NIPT result was low risk for Trisomy 13, Trisomy 18 and Down's syndrome  I also told her that the result is in her Mychart as well as the message from Dr Ina Holbrook regarding the result  Nurse line left for any further questions

## 2021-02-16 ENCOUNTER — LAB (OUTPATIENT)
Dept: LAB | Facility: IMAGING CENTER | Age: 36
End: 2021-02-16
Payer: COMMERCIAL

## 2021-02-16 DIAGNOSIS — E04.1 THYROID NODULE: ICD-10-CM

## 2021-02-16 DIAGNOSIS — Z3A.12 12 WEEKS GESTATION OF PREGNANCY: ICD-10-CM

## 2021-02-16 LAB
T4 FREE SERPL-MCNC: 0.96 NG/DL (ref 0.76–1.46)
TSH SERPL DL<=0.05 MIU/L-ACNC: 1.12 UIU/ML (ref 0.36–3.74)

## 2021-02-16 PROCEDURE — 36415 COLL VENOUS BLD VENIPUNCTURE: CPT

## 2021-02-16 PROCEDURE — 86800 THYROGLOBULIN ANTIBODY: CPT

## 2021-02-16 PROCEDURE — 86376 MICROSOMAL ANTIBODY EACH: CPT

## 2021-02-16 PROCEDURE — 81401 MOPATH PROCEDURE LEVEL 2: CPT

## 2021-02-16 PROCEDURE — 84443 ASSAY THYROID STIM HORMONE: CPT

## 2021-02-16 PROCEDURE — 84439 ASSAY OF FREE THYROXINE: CPT

## 2021-02-17 LAB
THYROGLOB AB SERPL-ACNC: <1 IU/ML (ref 0–0.9)
THYROPEROXIDASE AB SERPL-ACNC: <9 IU/ML (ref 0–34)

## 2021-02-23 LAB
CLINICAL INFO: NORMAL
ETHNIC BACKGROUND STATED: NORMAL
GENE MUT TESTED BLD/T: NORMAL
GENERAL COMMENTS:: NORMAL
LAB DIRECTOR NAME PROVIDER: NORMAL
REASON FOR REFERRAL (NARRATIVE): NORMAL
REF LAB TEST METHOD: NORMAL
SL AMB DISCLAIMER: NORMAL
SL AMB GENETIC COUNSELOR: NORMAL
SMN1 GENE MUT ANL BLD/T: NORMAL
SPECIMEN SOURCE: NORMAL

## 2021-02-24 ENCOUNTER — ROUTINE PRENATAL (OUTPATIENT)
Dept: OBGYN CLINIC | Facility: MEDICAL CENTER | Age: 36
End: 2021-02-24
Payer: COMMERCIAL

## 2021-02-24 VITALS — WEIGHT: 187 LBS | SYSTOLIC BLOOD PRESSURE: 100 MMHG | DIASTOLIC BLOOD PRESSURE: 62 MMHG | BODY MASS INDEX: 34.2 KG/M2

## 2021-02-24 DIAGNOSIS — Z3A.17 17 WEEKS GESTATION OF PREGNANCY: Primary | ICD-10-CM

## 2021-02-24 DIAGNOSIS — O99.210 OBESITY AFFECTING PREGNANCY, ANTEPARTUM: ICD-10-CM

## 2021-02-24 PROCEDURE — PNV: Performed by: NURSE PRACTITIONER

## 2021-02-24 PROCEDURE — 36415 COLL VENOUS BLD VENIPUNCTURE: CPT | Performed by: NURSE PRACTITIONER

## 2021-02-24 PROCEDURE — 82105 ALPHA-FETOPROTEIN SERUM: CPT | Performed by: NURSE PRACTITIONER

## 2021-02-24 NOTE — PROGRESS NOTES
Brandee Lane is a 37y o  year old  at 17w3d for routine prenatal visit    + FM, no vaginal bleeding, contractions, or LOF  Complaints: having problems with decreased appetite  Drinks a lot during the day  Worried about baby getting enough nutrients  Reassured that her body weight is sufficient to provide for the baby now  Will continue current water intake  Most recent ultrasound and labs reviewed had q breezy test  AFP drawn today sma results given is low risk  Has follow us scan scheduled

## 2021-02-26 LAB
2ND TRIMESTER 4 SCREEN SERPL-IMP: NORMAL
AFP ADJ MOM SERPL: 1.09
AFP INTERP AMN-IMP: NORMAL
AFP INTERP SERPL-IMP: NORMAL
AFP INTERP SERPL-IMP: NORMAL
AFP SERPL-MCNC: 38.1 NG/ML
AGE AT DELIVERY: 33.9 YR
GA METHOD: NORMAL
GA: 17.4 WEEKS
IDDM PATIENT QL: NO
MULTIPLE PREGNANCY: NO
NEURAL TUBE DEFECT RISK FETUS: 9231 %

## 2021-03-13 NOTE — PATIENT INSTRUCTIONS
Thank you for choosing us for your  care today  If you have any questions about your ultrasound or care, please do not hesitate to contact us or your primary obstetrician  Some general instructions for your pregnancy are:     Protect against coronavirus: Continue to practice social distancing, wear a mask, and wash your hands often  Pregnant women are increased risk of severe COVID  Notify your primary care doctor if you have any symptoms including cough, shortness of breath or difficulty breathing, fever, chills, muscle pain, sore throat, or loss of taste or smell  Pregnant women can receive the coronavirus vaccine   Exercise: Aim for 22 minutes per day (150 minutes per week) of regular exercise  Walking is great!  Nutrition: aim for calcium-rich and iron-rich foods as well as healthy sources of protein   Protect against the flu: get yourself and your entire household vaccinated against influenza  This will protect your baby   Learn about Preeclampsia: preeclampsia is a common, serious high blood pressure complication in pregnancy  A blood pressure of 644KVEW (systolic or top number) or 01TWSM (diastolic or bottom number) is not normal and needs evaluation by your doctor   If you smoke, try to reduce how many cigarettes you smoke or try to quit completely  Do not vape   Other warning signs to watch out for in pregnancy or postpartum: chest pain, obstructed breathing or shortness of breath, seizures, thoughts of hurting yourself or your baby, bleeding, a painful or swollen leg, fever, or headache (see AWHONN POST-BIRTH Warning Signs campaign)  If these happen call 911  Itching is also not normal in pregnancy and if you experience this, especially over your hands and feet, potentially worse at night, notify your doctors     Lastly, if you are contacted regarding participation in a survey about your experience in our office, please know that we take any feedback you provide seriously and use it to improve how we deliver care through our center

## 2021-03-15 ENCOUNTER — ROUTINE PRENATAL (OUTPATIENT)
Dept: PERINATAL CARE | Facility: CLINIC | Age: 36
End: 2021-03-15
Payer: COMMERCIAL

## 2021-03-15 VITALS
BODY MASS INDEX: 34.41 KG/M2 | DIASTOLIC BLOOD PRESSURE: 68 MMHG | WEIGHT: 187 LBS | HEIGHT: 62 IN | SYSTOLIC BLOOD PRESSURE: 112 MMHG | HEART RATE: 92 BPM

## 2021-03-15 DIAGNOSIS — O99.210 OBESITY AFFECTING PREGNANCY, ANTEPARTUM: ICD-10-CM

## 2021-03-15 DIAGNOSIS — Z36.86 ENCOUNTER FOR ANTENATAL SCREENING FOR CERVICAL LENGTH: ICD-10-CM

## 2021-03-15 DIAGNOSIS — O35.8XX0 RENAL AGENESIS OF FETUS AFFECTING ANTEPARTUM CARE OF MOTHER, SINGLE OR UNSPECIFIED FETUS: ICD-10-CM

## 2021-03-15 DIAGNOSIS — Z36.3 ENCOUNTER FOR ANTENATAL SCREENING FOR MALFORMATIONS: Primary | ICD-10-CM

## 2021-03-15 PROCEDURE — 76817 TRANSVAGINAL US OBSTETRIC: CPT | Performed by: OBSTETRICS & GYNECOLOGY

## 2021-03-15 PROCEDURE — 99213 OFFICE O/P EST LOW 20 MIN: CPT | Performed by: OBSTETRICS & GYNECOLOGY

## 2021-03-15 PROCEDURE — 76811 OB US DETAILED SNGL FETUS: CPT | Performed by: OBSTETRICS & GYNECOLOGY

## 2021-03-15 NOTE — PROGRESS NOTES
[address]: Ms Elvie Martini was seen today at 20w1d for anatomic survey and cervical length screening ultrasound  See ultrasound report under "OB Procedures" tab  Please don't hesitate to contact our office with any concerns or questions    Ronald Galloway MD

## 2021-03-17 ENCOUNTER — TELEPHONE (OUTPATIENT)
Dept: OBGYN CLINIC | Facility: MEDICAL CENTER | Age: 36
End: 2021-03-17

## 2021-03-17 ENCOUNTER — TELEPHONE (OUTPATIENT)
Dept: PERINATAL CARE | Facility: CLINIC | Age: 36
End: 2021-03-17

## 2021-03-17 PROBLEM — O35.8XX0 RENAL AGENESIS OF FETUS AFFECTING ANTEPARTUM CARE OF MOTHER: Status: ACTIVE | Noted: 2021-03-17

## 2021-03-17 NOTE — TELEPHONE ENCOUNTER
Returned patient's call  Reassured her that incompletely evaluated anatomy is a common occurrence and a reflection of our high standards for imaging and not that her baby is missing any body parts  Emotional support provided  Will message our office staff to see if she can come in sooner for her follow-up appointment to help alleviate her concern  All questions answered    Obdulia Rodriguez MD

## 2021-03-17 NOTE — TELEPHONE ENCOUNTER
Pt called office today very upset as the ultrasound report just came into MyChart  She states the doctor did not clear certain parts of the baby and she is very concerned about abnormalities  She is requesting a call back to discuss these concerns  Emotional support provided to pt  PT declines further needs at this time  Dr Valeriano Guerrero made aware of concerns and provided with pts call back #

## 2021-03-17 NOTE — TELEPHONE ENCOUNTER
Patient called worried because she states the doctor did not clear certain parts of the ultrasound and she is very concerned about abnormalities  She requests to talk to a doctor today even tho she has an appt with Dr Isabelle Garcia tomorrow and also requests of she could have a second level II US in office  I tried to explain that the reason we gave her a referral is because  are specialist for this type of imaging     Please review

## 2021-03-18 ENCOUNTER — ROUTINE PRENATAL (OUTPATIENT)
Dept: OBGYN CLINIC | Facility: MEDICAL CENTER | Age: 36
End: 2021-03-18

## 2021-03-18 VITALS — DIASTOLIC BLOOD PRESSURE: 64 MMHG | WEIGHT: 189 LBS | SYSTOLIC BLOOD PRESSURE: 118 MMHG | BODY MASS INDEX: 34.57 KG/M2

## 2021-03-18 DIAGNOSIS — Z34.92 SECOND TRIMESTER PREGNANCY: ICD-10-CM

## 2021-03-18 DIAGNOSIS — Z3A.20 20 WEEKS GESTATION OF PREGNANCY: Primary | ICD-10-CM

## 2021-03-18 DIAGNOSIS — O35.8XX0 RENAL AGENESIS OF FETUS AFFECTING ANTEPARTUM CARE OF MOTHER, SINGLE OR UNSPECIFIED FETUS: ICD-10-CM

## 2021-03-18 PROCEDURE — PNV: Performed by: OBSTETRICS & GYNECOLOGY
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
## 2021-03-31 ENCOUNTER — ULTRASOUND (OUTPATIENT)
Dept: PERINATAL CARE | Facility: CLINIC | Age: 36
End: 2021-03-31
Payer: COMMERCIAL

## 2021-03-31 VITALS
HEART RATE: 92 BPM | SYSTOLIC BLOOD PRESSURE: 124 MMHG | DIASTOLIC BLOOD PRESSURE: 65 MMHG | BODY MASS INDEX: 34.6 KG/M2 | HEIGHT: 62 IN | WEIGHT: 188 LBS

## 2021-03-31 DIAGNOSIS — IMO0002 EVALUATE ANATOMY NOT SEEN ON PRIOR SONOGRAM: ICD-10-CM

## 2021-03-31 DIAGNOSIS — O35.8XX0 RENAL AGENESIS OF FETUS AFFECTING ANTEPARTUM CARE OF MOTHER, SINGLE OR UNSPECIFIED FETUS: Primary | ICD-10-CM

## 2021-03-31 PROCEDURE — 76816 OB US FOLLOW-UP PER FETUS: CPT | Performed by: OBSTETRICS & GYNECOLOGY

## 2021-03-31 PROCEDURE — 99213 OFFICE O/P EST LOW 20 MIN: CPT | Performed by: OBSTETRICS & GYNECOLOGY

## 2021-03-31 NOTE — PATIENT INSTRUCTIONS
Thank you for choosing us for your  care today  If you have any questions about your ultrasound or care, please do not hesitate to contact us or your primary obstetrician  Some general instructions for your pregnancy are:     Protect against coronavirus: Continue to practice social distancing, wear a mask, and wash your hands often  Pregnant women are increased risk of severe COVID  Notify your primary care doctor if you have any symptoms including cough, shortness of breath or difficulty breathing, fever, chills, muscle pain, sore throat, or loss of taste or smell  Pregnant women can receive the coronavirus vaccine   Exercise: Aim for 22 minutes per day (150 minutes per week) of regular exercise  Walking is great!  Nutrition: aim for calcium-rich and iron-rich foods as well as healthy sources of protein   Protect against the flu: get yourself and your entire household vaccinated against influenza  This will protect your baby   Learn about Preeclampsia: preeclampsia is a common, serious high blood pressure complication in pregnancy  A blood pressure of 956MTOO (systolic or top number) or 78MLQZ (diastolic or bottom number) is not normal and needs evaluation by your doctor   If you smoke, try to reduce how many cigarettes you smoke or try to quit completely  Do not vape   Other warning signs to watch out for in pregnancy or postpartum: chest pain, obstructed breathing or shortness of breath, seizures, thoughts of hurting yourself or your baby, bleeding, a painful or swollen leg, fever, or headache (see AWHONN POST-BIRTH Warning Signs campaign)  If these happen call 911  Itching is also not normal in pregnancy and if you experience this, especially over your hands and feet, potentially worse at night, notify your doctors     Lastly, if you are contacted regarding participation in a survey about your experience in our office, please know that we take any feedback you provide seriously and use it to improve how we deliver care through our center

## 2021-03-31 NOTE — LETTER
March 31, 2021     Rocky Hansen, [address] [employer]    Patient: Jade Huston   YOB: 1985   Date of Visit: 3/31/2021       Dear Dr Nancy Claros: Thank you for referring Jade Huston to me for evaluation  Below are my notes for this consultation  I am really not certain whether this baby has a left kidney or not  She was reassured today and will be coming back in a few weeks for growth  If you have questions, please do not hesitate to call me  I look forward to following your patient along with you  Sincerely,        Beatris Whitten MD        CC: No Recipients  Beatris Whitten MD  3/31/2021  9:11 AM  Sign when Signing Visit  [address]: Ms Elvie Martini was seen today at 22w3d for followup missed anatomy ultrasound  See ultrasound report under "OB Procedures" tab  Please don't hesitate to contact our office with any concerns or questions    Beatris Whitten MD

## 2021-03-31 NOTE — PROGRESS NOTES
[address]: Ms Benjamin Atkins was seen today at 22w3d for followup missed anatomy ultrasound  See ultrasound report under "OB Procedures" tab  Please don't hesitate to contact our office with any concerns or questions    Davey Mary MD

## 2021-04-01 ENCOUNTER — ROUTINE PRENATAL (OUTPATIENT)
Dept: OBGYN CLINIC | Facility: MEDICAL CENTER | Age: 36
End: 2021-04-01

## 2021-04-01 VITALS — BODY MASS INDEX: 34.2 KG/M2 | WEIGHT: 187 LBS | SYSTOLIC BLOOD PRESSURE: 110 MMHG | DIASTOLIC BLOOD PRESSURE: 78 MMHG

## 2021-04-01 DIAGNOSIS — Z3A.22 22 WEEKS GESTATION OF PREGNANCY: Primary | ICD-10-CM

## 2021-04-01 DIAGNOSIS — Z34.92 SECOND TRIMESTER PREGNANCY: ICD-10-CM

## 2021-04-01 DIAGNOSIS — O35.8XX0 RENAL AGENESIS OF FETUS AFFECTING ANTEPARTUM CARE OF MOTHER, SINGLE OR UNSPECIFIED FETUS: ICD-10-CM

## 2021-04-01 DIAGNOSIS — O99.210 OBESITY AFFECTING PREGNANCY, ANTEPARTUM: ICD-10-CM

## 2021-04-01 PROCEDURE — PNV: Performed by: OBSTETRICS & GYNECOLOGY
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
## 2021-04-15 ENCOUNTER — ROUTINE PRENATAL (OUTPATIENT)
Dept: OBGYN CLINIC | Facility: MEDICAL CENTER | Age: 36
End: 2021-04-15

## 2021-04-15 VITALS — DIASTOLIC BLOOD PRESSURE: 70 MMHG | WEIGHT: 189.2 LBS | SYSTOLIC BLOOD PRESSURE: 115 MMHG | BODY MASS INDEX: 34.61 KG/M2

## 2021-04-15 DIAGNOSIS — Z3A.24 24 WEEKS GESTATION OF PREGNANCY: Primary | ICD-10-CM

## 2021-04-15 DIAGNOSIS — O35.8XX0 RENAL AGENESIS OF FETUS AFFECTING ANTEPARTUM CARE OF MOTHER, SINGLE OR UNSPECIFIED FETUS: ICD-10-CM

## 2021-04-15 PROCEDURE — PNV: Performed by: OBSTETRICS & GYNECOLOGY

## 2021-04-15 NOTE — PROGRESS NOTES
Aide Sherman is a 37y o  year old  at 18w2d for routine prenatal visit    + FM, no vaginal bleeding, contractions, or LOF  Complaints: Yes - pelvic pressure - supportive measures reviewed   Most recent ultrasound and labs reviewed    Concern for left kidney agenesis - otherwise normal anatomy scan, normal NIPT   Has follow up scan to confirm -offered follow up visit after next us to discuss further/ aware this can be a reproductive anatomical variant and that does not mean there would be complications with baby or delivery / I will be able to  her properly after confirmatory scan / we also discussed amniocentesis if interested but states wanted to wait till after scan   All questions answered   1hr gtt needed at 28 weeks

## 2021-04-17 ENCOUNTER — TELEPHONE (OUTPATIENT)
Dept: OTHER | Facility: OTHER | Age: 36
End: 2021-04-17

## 2021-04-17 NOTE — TELEPHONE ENCOUNTER
[phone] Pt  Rola Kline : 1985 Pt  Is 25 weeks pregnant and is experiencing vaginal discharge  On call provider paged

## 2021-04-23 ENCOUNTER — OFFICE VISIT (OUTPATIENT)
Dept: FAMILY MEDICINE CLINIC | Facility: CLINIC | Age: 36
End: 2021-04-23
Payer: COMMERCIAL

## 2021-04-23 VITALS
WEIGHT: 187 LBS | HEIGHT: 62 IN | DIASTOLIC BLOOD PRESSURE: 82 MMHG | BODY MASS INDEX: 34.41 KG/M2 | SYSTOLIC BLOOD PRESSURE: 120 MMHG

## 2021-04-23 DIAGNOSIS — K21.9 GASTROESOPHAGEAL REFLUX DISEASE WITHOUT ESOPHAGITIS: Primary | ICD-10-CM

## 2021-04-23 DIAGNOSIS — R79.89 LOW VITAMIN D LEVEL: ICD-10-CM

## 2021-04-23 DIAGNOSIS — Z3A.28 28 WEEKS GESTATION OF PREGNANCY: ICD-10-CM

## 2021-04-23 DIAGNOSIS — E53.8 LOW VITAMIN B12 LEVEL: ICD-10-CM

## 2021-04-23 PROCEDURE — 99214 OFFICE O/P EST MOD 30 MIN: CPT | Performed by: INTERNAL MEDICINE

## 2021-04-23 RX ORDER — ERGOCALCIFEROL 1.25 MG/1
50000 CAPSULE ORAL WEEKLY
Qty: 13 CAPSULE | Refills: 1 | Status: SHIPPED | OUTPATIENT
Start: 2021-04-23 | End: 2021-10-14 | Stop reason: ALTCHOICE

## 2021-04-23 RX ORDER — FOLIC ACID 1 MG/1
1 TABLET ORAL DAILY
Qty: 90 TABLET | Refills: 1 | Status: SHIPPED | OUTPATIENT
Start: 2021-04-23 | End: 2021-10-14 | Stop reason: ALTCHOICE

## 2021-04-23 NOTE — PROGRESS NOTES
Assessment/Plan:         Diagnoses and all orders for this visit:    Gastroesophageal reflux disease without esophagitis; Life style mod  RTC in 2-3 mos w :  -     Comprehensive metabolic panel; Future  -     CBC and differential; Future  -     Ferritin; Future  -     Lipid panel; Future  -     Magnesium; Future  -     TSH, 3rd generation; Future  -     T4, free; Future  -     UA (URINE) with reflex to Scope; Future    Low vitamin E83 level  -     folic acid (FOLVITE) 1 mg tablet; Take 1 tablet (1 mg total) by mouth daily  -     Vitamin B12; Future    Low vitamin D level  -     ergocalciferol (VITAMIN D2) 50,000 units; Take 1 capsule (50,000 Units total) by mouth once a week  -     Vitamin D 25 hydroxy; Future    28 weeks gestation of pregnancy; FU w Obs           Subjective:      Patient ID: Luis Alfredo Sahu is a 37 y o  female  [address] is here for Regular check up, she feels ok, recent Blood work and med list reviewed,       The following portions of the patient's history were reviewed and updated as appropriate: allergies, current medications, past family history, past social history, past surgical history and problem list     Review of Systems   Constitutional: Negative for chills, fatigue and fever  HENT: Negative for congestion, facial swelling, sore throat, trouble swallowing and voice change  Eyes: Negative for pain, discharge and visual disturbance  Respiratory: Negative for cough, shortness of breath and wheezing  Cardiovascular: Negative for chest pain, palpitations and leg swelling  Gastrointestinal: Negative for abdominal pain, blood in stool, constipation, diarrhea and nausea  Endocrine: Negative for polydipsia, polyphagia and polyuria  Genitourinary: Negative for difficulty urinating, hematuria and urgency  Musculoskeletal: Negative for arthralgias and myalgias  Skin: Negative for rash  Neurological: Negative for dizziness, tremors, weakness and headaches     Hematological: Negative for adenopathy  Does not bruise/bleed easily  Psychiatric/Behavioral: Negative for dysphoric mood, sleep disturbance and suicidal ideas  Objective:      /82 (BP Location: Left arm, Patient Position: Sitting, Cuff Size: Standard)   Ht 5' 2" (1 575 m)   Wt 84 8 kg (187 lb)   LMP 10/25/2020 (Exact Date)   BMI 34 20 kg/m²          Physical Exam  Constitutional:       General: She is not in acute distress  HENT:      Head: Normocephalic  Mouth/Throat:      Pharynx: No oropharyngeal exudate  Eyes:      General: No scleral icterus  Conjunctiva/sclera: Conjunctivae normal       Pupils: Pupils are equal, round, and reactive to light  Neck:      Musculoskeletal: Neck supple  Thyroid: No thyromegaly  Cardiovascular:      Rate and Rhythm: Normal rate and regular rhythm  Heart sounds: Normal heart sounds  No murmur  Pulmonary:      Effort: Pulmonary effort is normal  No respiratory distress  Breath sounds: Normal breath sounds  No wheezing or rales  Abdominal:      General: Bowel sounds are normal  There is no distension  Palpations: Abdomen is soft  Tenderness: There is no abdominal tenderness  There is no guarding or rebound  Musculoskeletal:         General: No tenderness  Lymphadenopathy:      Cervical: No cervical adenopathy  Skin:     Coloration: Skin is not pale  Findings: No rash  Neurological:      Mental Status: She is alert and oriented to person, place, and time  Sensory: No sensory deficit  Motor: No weakness

## 2021-04-28 ENCOUNTER — ULTRASOUND (OUTPATIENT)
Dept: PERINATAL CARE | Facility: CLINIC | Age: 36
End: 2021-04-28
Payer: COMMERCIAL

## 2021-04-28 VITALS
HEIGHT: 62 IN | BODY MASS INDEX: 34.78 KG/M2 | SYSTOLIC BLOOD PRESSURE: 124 MMHG | HEART RATE: 84 BPM | WEIGHT: 189 LBS | DIASTOLIC BLOOD PRESSURE: 67 MMHG

## 2021-04-28 DIAGNOSIS — O35.8XX0 RENAL AGENESIS OF FETUS AFFECTING ANTEPARTUM CARE OF MOTHER, SINGLE OR UNSPECIFIED FETUS: Primary | ICD-10-CM

## 2021-04-28 DIAGNOSIS — Z36.89 ENCOUNTER FOR ULTRASOUND TO CHECK FETAL GROWTH: ICD-10-CM

## 2021-04-28 DIAGNOSIS — Z36.2 ENCOUNTER FOR FOLLOW-UP ULTRASOUND OF FETAL ANATOMY: ICD-10-CM

## 2021-04-28 DIAGNOSIS — Z3A.26 26 WEEKS GESTATION OF PREGNANCY: ICD-10-CM

## 2021-04-28 PROCEDURE — 76816 OB US FOLLOW-UP PER FETUS: CPT | Performed by: OBSTETRICS & GYNECOLOGY

## 2021-04-28 PROCEDURE — 99213 OFFICE O/P EST LOW 20 MIN: CPT | Performed by: OBSTETRICS & GYNECOLOGY

## 2021-04-28 NOTE — PATIENT INSTRUCTIONS
Thank you for choosing us for your  care today  If you have any questions about your ultrasound or care, please do not hesitate to contact us or your primary obstetrician  Some general instructions for your pregnancy are:     Protect against coronavirus: Continue to practice social distancing, wear a mask, and wash your hands often  Pregnant women are increased risk of severe COVID  Notify your primary care doctor if you have any symptoms including cough, shortness of breath or difficulty breathing, fever, chills, muscle pain, sore throat, or loss of taste or smell  Pregnant women can receive the coronavirus vaccine   Exercise: Aim for 22 minutes per day (150 minutes per week) of regular exercise  Walking is great!  Nutrition: aim for calcium-rich and iron-rich foods as well as healthy sources of protein   Protect against the flu: get yourself and your entire household vaccinated against influenza  This will protect your baby   Learn about Preeclampsia: preeclampsia is a common, serious high blood pressure complication in pregnancy  A blood pressure of 940LHBO (systolic or top number) or 80JHQT (diastolic or bottom number) is not normal and needs evaluation by your doctor   If you smoke, try to reduce how many cigarettes you smoke or try to quit completely  Do not vape   Other warning signs to watch out for in pregnancy or postpartum: chest pain, obstructed breathing or shortness of breath, seizures, thoughts of hurting yourself or your baby, bleeding, a painful or swollen leg, fever, or headache (see [facility] POST-BIRTH Warning Signs campaign)  If these happen call 911  Itching is also not normal in pregnancy and if you experience this, especially over your hands and feet, potentially worse at night, notify your doctors     Lastly, if you are contacted regarding participation in a survey about your experience in our office, please know that we take any feedback you provide seriously and use it to improve how we deliver care through our center

## 2021-04-30 ENCOUNTER — ROUTINE PRENATAL (OUTPATIENT)
Dept: OBGYN CLINIC | Facility: CLINIC | Age: 36
End: 2021-04-30

## 2021-04-30 VITALS — SYSTOLIC BLOOD PRESSURE: 120 MMHG | WEIGHT: 189 LBS | DIASTOLIC BLOOD PRESSURE: 75 MMHG | BODY MASS INDEX: 34.57 KG/M2

## 2021-04-30 DIAGNOSIS — Z3A.26 26 WEEKS GESTATION OF PREGNANCY: Primary | ICD-10-CM

## 2021-04-30 DIAGNOSIS — O35.8XX0 RENAL AGENESIS OF FETUS AFFECTING ANTEPARTUM CARE OF MOTHER, SINGLE OR UNSPECIFIED FETUS: ICD-10-CM

## 2021-04-30 PROCEDURE — PNV: Performed by: OBSTETRICS & GYNECOLOGY

## 2021-04-30 NOTE — PROGRESS NOTES
[id number] [address] [address] [facility] [address] [facility] [address]: Ms Sebastian Noyola was seen today at 26w3d for fetal growth and followup missed anatomy ultrasound  See ultrasound report under "OB Procedures" tab  Please don't hesitate to contact our office with any concerns or questions    Valeriano Lawson MD

## 2021-04-30 NOTE — PROGRESS NOTES
Olayinka Fritz is a 37y o  year old  at 34w7d for routine prenatal visit    + FM, no vaginal bleeding, contractions, or LOF  Complaints: No   Most recent ultrasound and labs reviewed  Left renal agenesis noted on most recent MFM scan otherwise normal anatomy and fetal growth , normal KARINA - patient states today feeling more comfortable with findings and less worried , she is awaiting to hear back for pediatric nephrology consultation follow up    All questions answered  Encouraged to keep scheduled appointment for 28 wek visit , aware will need 1 hr gtt and cbc next visit

## 2021-05-03 ENCOUNTER — TELEPHONE (OUTPATIENT)
Dept: NEPHROLOGY | Facility: CLINIC | Age: 36
End: 2021-05-03

## 2021-05-03 ENCOUNTER — TELEPHONE (OUTPATIENT)
Dept: PERINATAL CARE | Facility: CLINIC | Age: 36
End: 2021-05-03

## 2021-05-03 NOTE — TELEPHONE ENCOUNTER
Elvira Rodriguez from maternal fetus  medicine called asking for a new patient appointment   Pt is scheduled for 6/7 at 11 am  Pt is 27 weeks pregnant and she is due on 8/1/21  They are asking if we could bring patient before 32 weeks   Pt is on our cancellation list

## 2021-05-03 NOTE — TELEPHONE ENCOUNTER
Received message from Dr Olga Elmore referral placed Pediatric  Nephrology consult  Phone call to Dr Jeannie Santos office # [phone], spoke with Milind  First available new patient appointment Monday June 7th 11 am  Per Milind she will add patient name to her cancellation list to offer sooner appointment if possible  Phone call to Rosmery Resendez, she is okay with any appt  date/time, currently not working  Confirmed appt  Monday June 7th 11 am and she is aware Ped Nephro office may call her with scheduling sooner if appt becomes available  Gave address/directions/phone number for this  appt and patient aware MFM office will send US images for Dr  review  Dr Belle [place], [place]  [address] [facility],[address] verbalized understanding of all information

## 2021-05-04 ENCOUNTER — CONSULT (OUTPATIENT)
Dept: NEPHROLOGY | Facility: CLINIC | Age: 36
End: 2021-05-04
Payer: COMMERCIAL

## 2021-05-04 VITALS
DIASTOLIC BLOOD PRESSURE: 58 MMHG | HEIGHT: 61 IN | SYSTOLIC BLOOD PRESSURE: 108 MMHG | HEART RATE: 93 BPM | BODY MASS INDEX: 35.72 KG/M2 | WEIGHT: 189.2 LBS

## 2021-05-04 DIAGNOSIS — Z3A.26 26 WEEKS GESTATION OF PREGNANCY: ICD-10-CM

## 2021-05-04 DIAGNOSIS — O35.8XX0 RENAL AGENESIS OF FETUS AFFECTING ANTEPARTUM CARE OF MOTHER, SINGLE OR UNSPECIFIED FETUS: ICD-10-CM

## 2021-05-04 PROCEDURE — 99203 OFFICE O/P NEW LOW 30 MIN: CPT | Performed by: PEDIATRICS

## 2021-05-04 NOTE — PATIENT INSTRUCTIONS
Reviewed implications today of solitary kidney at length with Feliz  Recommend renal ultrasound some time after delivery to confirm anatomy  Will plan for outpatient consultation to establish care once baby is born and has been discharged home

## 2021-05-04 NOTE — PROGRESS NOTES
Pediatric Nephrology Consultation  Teagan Vegas  [facility]:[phone]  Date:05/04/21      Assessment/Plan   Assessment:    21-year-old pregnant female with renal agenesis of fetus noted on prenatal imaging here for evaluation  Plan:  Diagnoses and all orders for this visit:    Renal agenesis of fetus affecting antepartum care of mother, single or unspecified fetus  -     Ambulatory referral to Pediatric Nephrology    26 weeks gestation of pregnancy  -     Ambulatory referral to Pediatric Nephrology      Patient Instructions     Reviewed implications today of solitary kidney at length with Moise Todd  Recommend renal ultrasound some time after delivery to confirm anatomy  Will plan for outpatient consultation to establish care once baby is born and has been discharged home  HPI: Angel Urena is a 37 y  o female who presents for evaluation of   Chief Complaint   Patient presents with    Consult     Moise Todd states that she was first made aware of an abnormality on prenatal imaging at the time of her 20 week anatomy scan  Left kidney was not able to be visualized at the time of this study  Right kidney was normal in appearance with no evidence of hydronephrosis  Anatomy scan was otherwise within normal limits with normal level of amniotic fluid  Repeat ultrasound was recommended few weeks later to evaluate for missed anatomy which was similar in appearance  Mom states due to her anxiety, she asked for her follow-up ultrasound to be completed at 26 weeks to reassess anatomy  This again confirmed absent left kidney  Mom was referred to Nephrology to discuss implications after delivery  She states that she feels otherwise well  There is no family history of renal disease that she is aware of at this time  She has had a previous pregnancies which was healthy and no congenital anomalies noted      Review of Systems  Constitutional:   Negative for fevers, fatigue  HEENT: negative for rhinorrhea, congestion or sore throat  Respiratory: negative for cough or shortness of breath? ?  Cardiovascular: negative for facial or lower extremity edema  Gastrointestinal: negative for abdominal pain  Musculoskeletal: negative for joint pain or swelling, back pain  Neurologic: negative for headache  Hematologic: negative for bruising or bleeding  Integumentary: negative for rashes    The remainder of review of systems as noted per HPI  ?         Past Medical History:   Diagnosis Date    Abnormal Pap smear of cervix     COVID-19     Disease of thyroid gland     h/o thyroid nodules    HPV (human papilloma virus) infection     Migraine     Renal agenesis of fetus affecting antepartum care of mother 3/17/2021    Urinary tract infection     utix1 in past    Varicella     positive history         Past Surgical History:   Procedure Laterality Date    COLPOSCOPY      TONSILLECTOMY      WISDOM TOOTH EXTRACTION        Family History   Problem Relation Age of Onset    Hyperlipidemia Mother     Hypertension Mother     Diabetes Father     Hyperlipidemia Father     Hypertension Father     Heart disease Father         cardiac stent    No Known Problems Sister     Heart defect Brother     Diabetes Paternal Grandfather     Stroke Maternal Grandmother     No Known Problems Maternal Grandfather     Hyperlipidemia Paternal Grandmother     Hypertension Paternal Grandmother     No Known Problems Son     Heart defect Maternal Uncle     Breast cancer Neg Hx     Colon cancer Neg Hx     Ovarian cancer Neg Hx      Social History     Socioeconomic History    Marital status: /Civil Union     Spouse name: Not on file    Number of children: Not on file    Years of education: Not on file    Highest education level: Not on file   Occupational History     Employer: [employer]   Social Needs    Financial resource strain: Not hard at all   Elodia-Patricia insecurity     Worry: Never true     Inability: Never true   [employer] needs     Medical: No     Non-medical: No   Tobacco Use    Smoking status: Former Smoker     Types: Cigarettes     Quit date: 2018     Years since quitting: 3 3    Smokeless tobacco: Never Used    Tobacco comment: Quit with knowledge of pregnancy   Substance and Sexual Activity    Alcohol use: Not Currently    Drug use: Never    Sexual activity: Yes     Partners: Male   Lifestyle    Physical activity     Days per week: 4 days     Minutes per session: 30 min    Stress: Not at all   Relationships    Social connections     Talks on phone: More than three times a week     Gets together: More than three times a week     Attends Christianity service: 1 to 4 times per year     Active member of club or organization: No     Attends meetings of clubs or organizations: Never     Relationship status:     Intimate partner violence     Fear of current or ex partner: No     Emotionally abused: No     Physically abused: No     Forced sexual activity: No   Other Topics Concern    Not on file   Social History Narrative    Not on file       No Known Allergies     Current Outpatient Medications:     ergocalciferol (VITAMIN D2) 50,000 units, Take 1 capsule (50,000 Units total) by mouth once a week, Disp: 13 capsule, Rfl: 1    folic acid (FOLVITE) 1 mg tablet, Take 1 tablet (1 mg total) by mouth daily, Disp: 90 tablet, Rfl: 1    Prenatal Vit-DSS-Fe Cbn-FA (PRENATAL AD PO), Take 1 tablet by mouth daily, Disp: , Rfl:      Objective   Vitals:    05/04/21 1325   BP: 108/58   Pulse: 93     Growth percentile SmartLinks can only be used for patients less than 21years old    5' 1 42" (1 56 m)  85 8 kg (189 lb 3 2 oz)  Body mass index is 35 27 kg/m²        Lab Results:   Lab Results   Component Value Date    WBC 10 71 (H) 01/04/2021    HGB 12 9 01/04/2021    HCT 38 2 01/04/2021    MCV 82 01/04/2021     01/04/2021     Lab Results   Component Value Date    GLUCOSE 154 08/10/2018    GLUCOSE 134 08/10/2018    CALCIUM 9 3 08/28/2020    K 4 1 08/28/2020    CO2 28 08/28/2020     08/28/2020    BUN 11 08/28/2020    CREATININE 0 75 08/28/2020     Lab Results   Component Value Date    CALCIUM 9 3 08/28/2020       Imaging:  As noted above  Other Studies:  none    All laboratory results and imaging was reviewed by me and summarized above        Total time spent was 45 minutes in counseling regarding diagnosis and   implications as well as management and long-term follow-up after delivery  Alexander Casas

## 2021-05-12 ENCOUNTER — ROUTINE PRENATAL (OUTPATIENT)
Dept: OBGYN CLINIC | Facility: MEDICAL CENTER | Age: 36
End: 2021-05-12
Payer: COMMERCIAL

## 2021-05-12 VITALS — DIASTOLIC BLOOD PRESSURE: 70 MMHG | SYSTOLIC BLOOD PRESSURE: 110 MMHG | BODY MASS INDEX: 35.23 KG/M2 | WEIGHT: 189 LBS

## 2021-05-12 DIAGNOSIS — Z3A.28 28 WEEKS GESTATION OF PREGNANCY: ICD-10-CM

## 2021-05-12 DIAGNOSIS — Z23 NEED FOR TDAP VACCINATION: ICD-10-CM

## 2021-05-12 DIAGNOSIS — Z34.93 THIRD TRIMESTER PREGNANCY: Primary | ICD-10-CM

## 2021-05-12 PROCEDURE — 90471 IMMUNIZATION ADMIN: CPT | Performed by: OBSTETRICS & GYNECOLOGY

## 2021-05-12 PROCEDURE — 90715 TDAP VACCINE 7 YRS/> IM: CPT | Performed by: OBSTETRICS & GYNECOLOGY

## 2021-05-12 PROCEDURE — PNV: Performed by: OBSTETRICS & GYNECOLOGY

## 2021-05-12 NOTE — PROGRESS NOTES
Routine Prenatal Visit  [facility] [address] [place] [address]    Assessment/Plan:  Reese Echeverria is a 37y o  year old  at 28w3d who presents for routine prenatal visit  1  Third trimester pregnancy  -     Glucose, 1H PG  -     CBC and differential  -     TDAP Vaccine greater than or equal to 6yo    2  28 weeks gestation of pregnancy  -     Glucose, 1H PG  -     CBC and differential  -     TDAP Vaccine greater than or equal to 6yo    3  Need for Tdap vaccination  -     TDAP Vaccine greater than or equal to 6yo          Subjective:     CC: Prenatal care    Zac Pinzon is a 37 y o  Eri Serve female who presents for routine prenatal care at 28w3d  Pregnancy ROS: no leakage of fluid, pelvic pain, or vaginal bleeding   good fetal movement  - Birth plan given, peds list given, birth consent signed  - 28 wk labs drawn  - Tdap given, recommendations reviewed for family vaccination  - Rhogam not indicated    The following portions of the patient's history were reviewed and updated as appropriate: allergies, current medications, past family history, past medical history, obstetric history, gynecologic history, past social history, past surgical history and problem list       Objective:  /70   Wt 85 7 kg (189 lb)   LMP 10/25/2020 (Exact Date)   BMI 35 23 kg/m²   Pregravid Weight/BMI: 88 5 kg (195 lb) (BMI 36 35)  Current Weight: 85 7 kg (189 lb)   Total Weight Gain: -2 722 kg (-6 lb)   Pre-Yessica Vitals      Most Recent Value   Prenatal Assessment   Fetal Heart Rate  153   Fundal Height (cm)  28 cm   Movement  Present   Prenatal Vitals   Blood Pressure  110/70   Weight - Scale  85 7 kg (189 lb)   Urine Albumin/Glucose   Dilation/Effacement/Station   Vaginal Drainage   Edema   LLE Edema  None   RLE Edema  None           General: Well appearing, no distress  Respiratory: Unlabored breathing  Cardiovascular: Regular rate    Abdomen: Soft, gravid, nontender  Fundal Height: Appropriate for gestational age  Extremities: Warm and well perfused  Non tender

## 2021-05-12 NOTE — PATIENT INSTRUCTIONS
Fetal Movement   WHAT YOU NEED TO KNOW:   Fetal movements are the kicks, rolls, and hiccups of your unborn baby  You may start to feel these movements when you are 20 weeks pregnant  The movements grow stronger and more frequent as your baby grows  Fetal movements show that your unborn baby is getting the oxygen and nutrients he needs before birth  Fewer fetal movements may signal a problem with your baby's health  DISCHARGE INSTRUCTIONS:   Follow up with your healthcare provider or obstetrician as directed:  Write down your questions so you remember to ask them during your visits  Normal fetal movement:  Fetal activity can be described by 4 states, from least to most active  During quiet sleep, your unborn baby may be still for up to 2 hours  During active sleep, he kicks, rolls, and moves often  During the quiet awake state, he may only move his eyes  The active awake state includes strong kicks and rolls  What affects fetal movement:  You may feel your baby move more after you eat, or after you drink caffeine  You may feel your baby move less while you are more active, such as when you exercise  You may also feel fewer movements if you are obese  Certain medicines can change your baby's movements  Tell your healthcare provider about the medicines you are taking  Track fetal movements at home:  Fetal movement is most often felt when you lie quietly on your side  Your healthcare provider may ask you to count movements for 2 hours  He may ask you to track how long it takes for your baby to move 10 times  Keep a log of your baby's movements  Contact your healthcare provider or obstetrician if:   · It takes longer than usual to feel 10 of your unborn baby's movements  · You do not feel your unborn baby move at least 10 times in 2 hours  · The skin on your hands, feet, and around your eyes is more swollen than usual      · You have a headache for at least 24 hours  · Tiny red dots appear on your skin  · Your belly is tender when you press on it  · You have questions or concerns about your condition or care  Return to the emergency department if:   · You do not feel your unborn baby move for 12 hours  · You feel cramping or constant pain in your abdomen  · You have heavy bleeding from your vagina  · You have a severe headache and cannot see clearly  · You are having trouble breathing or are vomiting  · You have a seizure  © Copyright [address] Information is for End User's use only and may not be sold, redistributed or otherwise used for commercial purposes  All illustrations and images included in CareNotes® are the copyrighted property of A D A M , Inc  or [address] [place], [place] [address]   The above information is an  only  It is not intended as medical advice for individual conditions or treatments  Talk to your doctor, nurse or pharmacist before following any medical regimen to see if it is safe and effective for you

## 2021-05-13 LAB
BASOPHILS # BLD AUTO: 24 CELLS/UL (ref 0–200)
BASOPHILS NFR BLD AUTO: 0.2 %
EOSINOPHIL # BLD AUTO: 49 CELLS/UL (ref 15–500)
EOSINOPHIL NFR BLD AUTO: 0.4 %
ERYTHROCYTE [DISTWIDTH] IN BLOOD BY AUTOMATED COUNT: 13.3 % (ref 11–15)
GLUCOSE 1H P 50 G GLC PO SERPL-MCNC: 97 MG/DL
HCT VFR BLD AUTO: 34.1 % (ref 35–45)
HGB BLD-MCNC: 11.4 G/DL (ref 11.7–15.5)
LYMPHOCYTES # BLD AUTO: 2428 CELLS/UL (ref 850–3900)
LYMPHOCYTES NFR BLD AUTO: 19.9 %
MCH RBC QN AUTO: 28.1 PG (ref 27–33)
MCHC RBC AUTO-ENTMCNC: 33.4 G/DL (ref 32–36)
MCV RBC AUTO: 84.2 FL (ref 80–100)
MONOCYTES # BLD AUTO: 464 CELLS/UL (ref 200–950)
MONOCYTES NFR BLD AUTO: 3.8 %
NEUTROPHILS # BLD AUTO: 9235 CELLS/UL (ref 1500–7800)
NEUTROPHILS NFR BLD AUTO: 75.7 %
PLATELET # BLD AUTO: 364 THOUSAND/UL (ref 140–400)
PMV BLD REES-ECKER: 10.5 FL (ref 7.5–12.5)
RBC # BLD AUTO: 4.05 MILLION/UL (ref 3.8–5.1)
WBC # BLD AUTO: 12.2 THOUSAND/UL (ref 3.8–10.8)

## 2021-05-27 ENCOUNTER — ROUTINE PRENATAL (OUTPATIENT)
Dept: OBGYN CLINIC | Facility: MEDICAL CENTER | Age: 36
End: 2021-05-27

## 2021-05-27 VITALS — WEIGHT: 189 LBS | DIASTOLIC BLOOD PRESSURE: 64 MMHG | SYSTOLIC BLOOD PRESSURE: 98 MMHG | BODY MASS INDEX: 35.23 KG/M2

## 2021-05-27 DIAGNOSIS — Z3A.30 30 WEEKS GESTATION OF PREGNANCY: Primary | ICD-10-CM

## 2021-05-27 DIAGNOSIS — O35.8XX0 RENAL AGENESIS OF FETUS AFFECTING ANTEPARTUM CARE OF MOTHER, SINGLE OR UNSPECIFIED FETUS: ICD-10-CM

## 2021-05-27 DIAGNOSIS — Z34.93 THIRD TRIMESTER PREGNANCY: ICD-10-CM

## 2021-05-27 DIAGNOSIS — K21.9 GASTROESOPHAGEAL REFLUX DISEASE WITHOUT ESOPHAGITIS: ICD-10-CM

## 2021-05-27 PROCEDURE — PNV: Performed by: OBSTETRICS & GYNECOLOGY
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
## 2021-06-07 PROBLEM — Z3A.32 32 WEEKS GESTATION OF PREGNANCY: Status: ACTIVE | Noted: 2021-06-07

## 2021-06-07 NOTE — ASSESSMENT & PLAN NOTE
BMI 35   growth scan - was 4/28/21    MEASUREMENTS (* Included In Average GA)      AC              21 5 cm        26 weeks 0 days* (27%)   BPD              6 9 cm        27 weeks 5 days* (80%)   HC              24 9 cm        27 weeks 1 day * (46%)   Femur            4 9 cm        26 weeks 4 days* (40%)      EFW Hadlock 4    928 grams - 2 lbs 1 oz                 (38%)      THE AVERAGE GESTATIONAL AGE is 26 weeks 6 days +/- 14 days      Next on  6/9/21

## 2021-06-07 NOTE — ASSESSMENT & PLAN NOTE
Left side noted on US   Will get growth again on 6/9/21   peds nephrologist - will do sonogram at delivery

## 2021-06-07 NOTE — PROGRESS NOTES
Routine Prenatal Visit  [facility] [address] [place] [address]    Assessment/Plan:  Viv Ford is a 37y o  year old  at 32w1d who presents for routine prenatal visit  1  32 weeks gestation of pregnancy  Assessment & Plan:  Precautions given   GTT - 97      Will do GBS at 37 weeks       2  Obesity affecting pregnancy, antepartum  Assessment & Plan:  BMI 35   growth scan - was 21    MEASUREMENTS (* Included In Average GA)      AC              21 5 cm        26 weeks 0 days* (27%)   BPD              6 9 cm        27 weeks 5 days* (80%)   HC              24 9 cm        27 weeks 1 day * (46%)   Femur            4 9 cm        26 weeks 4 days* (40%)      EFW Hadlock 4    928 grams - 2 lbs 1 oz                 (38%)      THE AVERAGE GESTATIONAL AGE is 26 weeks 6 days +/- 14 days  Next on  21           3  Renal agenesis of fetus affecting antepartum care of mother, single or unspecified fetus  Assessment & Plan:  Left side noted on US   Will get growth again on 21   peds nephrologist - will do sonogram at delivery       4  Gastroesophageal reflux disease without esophagitis  Assessment & Plan:  Wild is not working   Will give Protonix  BP is normal 100/80    Orders:  -     pantoprazole (PROTONIX) 20 mg tablet; Take 1 tablet (20 mg total) by mouth daily        Subjective:     CC: Prenatal care    Danielle Ramirez is a 37 y o   female who presents for routine prenatal care at 32w1d  Pregnancy ROS: no leakage of fluid, pelvic pain, or vaginal bleeding  Yes   fetal movement      The following portions of the patient's history were reviewed and updated as appropriate: allergies, current medications, past family history, past medical history, obstetric history, gynecologic history, past social history, past surgical history and problem list       Objective:  /80   Wt 85 7 kg (189 lb)   LMP 10/25/2020 (Exact Date)   BMI 35 23 kg/m²   Pregravid Weight/BMI: 88 5 kg (195 lb) (BMI 36 35)  Current Weight: 85 7 kg (189 lb)   Total Weight Gain: -2 722 kg (-6 lb)   Pre- Vitals      Most Recent Value   Prenatal Assessment   Movement  Present   Prenatal Vitals   Blood Pressure  100/80   Weight - Scale  85 7 kg (189 lb)   Urine Albumin/Glucose   Dilation/Effacement/Station   Vaginal Drainage   Edema   LLE Edema  None   RLE Edema  None           General: Well appearing, no distress  Respiratory: Unlabored breathing  Cardiovascular: Regular rate  Abdomen: Soft, gravid, nontender  Fundal Height: Appropriate for gestational age  Extremities: Warm and well perfused  Non tender

## 2021-06-08 ENCOUNTER — ROUTINE PRENATAL (OUTPATIENT)
Dept: OBGYN CLINIC | Facility: MEDICAL CENTER | Age: 36
End: 2021-06-08

## 2021-06-08 VITALS — DIASTOLIC BLOOD PRESSURE: 80 MMHG | BODY MASS INDEX: 35.23 KG/M2 | WEIGHT: 189 LBS | SYSTOLIC BLOOD PRESSURE: 100 MMHG

## 2021-06-08 DIAGNOSIS — O99.210 OBESITY AFFECTING PREGNANCY, ANTEPARTUM: ICD-10-CM

## 2021-06-08 DIAGNOSIS — O35.8XX0 RENAL AGENESIS OF FETUS AFFECTING ANTEPARTUM CARE OF MOTHER, SINGLE OR UNSPECIFIED FETUS: ICD-10-CM

## 2021-06-08 DIAGNOSIS — Z3A.32 32 WEEKS GESTATION OF PREGNANCY: Primary | ICD-10-CM

## 2021-06-08 DIAGNOSIS — K21.9 GASTROESOPHAGEAL REFLUX DISEASE WITHOUT ESOPHAGITIS: ICD-10-CM

## 2021-06-08 PROCEDURE — PNV: Performed by: OBSTETRICS & GYNECOLOGY

## 2021-06-08 RX ORDER — PANTOPRAZOLE SODIUM 20 MG/1
20 TABLET, DELAYED RELEASE ORAL DAILY
Qty: 90 TABLET | Refills: 3 | Status: SHIPPED | OUTPATIENT
Start: 2021-06-08

## 2021-06-09 ENCOUNTER — ULTRASOUND (OUTPATIENT)
Dept: PERINATAL CARE | Facility: CLINIC | Age: 36
End: 2021-06-09
Payer: COMMERCIAL

## 2021-06-09 VITALS
SYSTOLIC BLOOD PRESSURE: 118 MMHG | BODY MASS INDEX: 34.93 KG/M2 | HEART RATE: 99 BPM | WEIGHT: 189.8 LBS | DIASTOLIC BLOOD PRESSURE: 56 MMHG | HEIGHT: 62 IN

## 2021-06-09 DIAGNOSIS — O35.8XX0 RENAL AGENESIS OF FETUS AFFECTING ANTEPARTUM CARE OF MOTHER, SINGLE OR UNSPECIFIED FETUS: Primary | ICD-10-CM

## 2021-06-09 DIAGNOSIS — Z3A.32 32 WEEKS GESTATION OF PREGNANCY: ICD-10-CM

## 2021-06-09 DIAGNOSIS — O99.210 OBESITY AFFECTING PREGNANCY, ANTEPARTUM: ICD-10-CM

## 2021-06-09 PROCEDURE — 76816 OB US FOLLOW-UP PER FETUS: CPT | Performed by: OBSTETRICS & GYNECOLOGY

## 2021-06-09 PROCEDURE — 99213 OFFICE O/P EST LOW 20 MIN: CPT | Performed by: OBSTETRICS & GYNECOLOGY

## 2021-06-09 NOTE — LETTER
2021     SADIA Lanier  [address]  [address]    Patient: Berenice James   YOB: 1985   Date of Visit: 2021       Dear Dr Isaias Monson: Thank you for referring Berenice James to me for evaluation  Below are my notes for this consultation  If you have questions, please do not hesitate to call me  I look forward to following your patient along with you  Sincerely,        Rajeev Aguilar MD        CC: No Recipients  Rajeev Aguilar MD  2021  7:12 PM  Sign when Signing Visit  Kelsey Calderón here today for a follow-up ultrasound for fetal growth and to review the fetal kidneys as a prior ultrasound showed left-sided unilateral renal agenesis  She has met with Dr Adilia Guerra from [facility] and has plans for 6 month followups after delivery and also an ultrasound postnatally before discharge  Ultrasound today shows a fetus that is growing concordant with its dates  Again the right kidney appears normal as does the bladder  The left kidney is not visualized either in its normal position or in the pelvis  Follow-up:  No further follow-up ultrasounds are recommended at this time  I recommend the Covid vaccine in pregnancy  The benefits of COVID vaccination in pregnancy is to decrease the mothers risk of developing a Covid infection  A severe Covid infection in pregnancy can cause an increased risk for a  delivery  The vaccine also benefits the baby in that the same antibodies she develops can cross the placenta and cross her breast milk to give the baby some protection after birth  Levels of antibodies developed after a vaccine are higher than those that develop after a natural infection thus providing even further benefit to the baby      Rajeev Aguilar MD    Pre visit time reviewing her records   5 minutes  Face to face time 10 minutes  Post visit time on documentation of note, updating her problem list, adding orders and prescriptions 5 minutes  Procedures that were completed today were charged separately     The level of decision making was  low level complexity

## 2021-06-09 NOTE — PATIENT INSTRUCTIONS
Based on my review of the information below,  I would encourage vaccination to prevent patients from developing a severe COVID virus infection and the resultant maternal and fetal complications that can arise with a severe infection  COVID-19 mRNA vaccines have been found to generate Covid antibodies in pregnant and lactating women similar to that observed in non-pregnant women  Vaccine-induced antibody levels were significantly greater than the levels found in response to natural infection  Immune transfer of these antibodies to neonates if found to occur via the placenta and breast milk  CDCs summary on the COVID vaccination in pregnancy and breast feeding: [employer] is  html     Debo moses    [email or web address] ajog org/[email or web address]/fulltext

## 2021-06-09 NOTE — PROGRESS NOTES
Miri Smalls here today for a follow-up ultrasound for fetal growth and to review the fetal kidneys as a prior ultrasound showed left-sided unilateral renal agenesis  She has met with Dr Joseph Galicia from [facility] and has plans for 6 month followups after delivery and also an ultrasound postnatally before discharge  Ultrasound today shows a fetus that is growing concordant with its dates  Again the right kidney appears normal as does the bladder  The left kidney is not visualized either in its normal position or in the pelvis  Follow-up:  No further follow-up ultrasounds are recommended at this time  I recommend the Covid vaccine in pregnancy  The benefits of COVID vaccination in pregnancy is to decrease the mothers risk of developing a Covid infection  A severe Covid infection in pregnancy can cause an increased risk for a  delivery  The vaccine also benefits the baby in that the same antibodies she develops can cross the placenta and cross her breast milk to give the baby some protection after birth  Levels of antibodies developed after a vaccine are higher than those that develop after a natural infection thus providing even further benefit to the baby  Riki Francis MD    Pre visit time reviewing her records   5 minutes  Face to face time 10 minutes  Post visit time on documentation of note, updating her problem list, adding orders and prescriptions 5 minutes  Procedures that were completed today were charged separately     The level of decision making was  low level complexity

## 2021-06-22 ENCOUNTER — ROUTINE PRENATAL (OUTPATIENT)
Dept: OBGYN CLINIC | Facility: MEDICAL CENTER | Age: 36
End: 2021-06-22

## 2021-06-22 VITALS — WEIGHT: 189.4 LBS | SYSTOLIC BLOOD PRESSURE: 116 MMHG | BODY MASS INDEX: 34.64 KG/M2 | DIASTOLIC BLOOD PRESSURE: 76 MMHG

## 2021-06-22 DIAGNOSIS — Z3A.34 34 WEEKS GESTATION OF PREGNANCY: Primary | ICD-10-CM

## 2021-06-22 DIAGNOSIS — O35.8XX0 RENAL AGENESIS OF FETUS AFFECTING ANTEPARTUM CARE OF MOTHER, SINGLE OR UNSPECIFIED FETUS: ICD-10-CM

## 2021-06-22 PROCEDURE — PNV: Performed by: STUDENT IN AN ORGANIZED HEALTH CARE EDUCATION/TRAINING PROGRAM

## 2021-06-22 NOTE — PROGRESS NOTES
Routine Prenatal Visit  [facility] [address] [place] [address]    Assessment/Plan:  Kerry Rice is a 37y o  year old  at 34w2d who presents for routine prenatal visit  1  34 weeks gestation of pregnancy  Assessment & Plan:  - GBS next visit  - Reviewed most recent fetal US, 30%ile, vertex, no further scans indicated  - Delivery Plan: Desires to await spontaneous labor  - Feeding: Breastfeeding  - Patient Education: Fetal kick counts and labor precautions reviewed  Perineal massage education reviewed  2  Renal agenesis of fetus affecting antepartum care of mother, single or unspecified fetus  Assessment & Plan:  - Left-sided unilateral renal agenesis, s/p consult with Dr Kiarra Armstrong; recommend renal ultrasound after delivery          Subjective:     CC: Prenatal care    Bisi Urias is a 37 y o  Tavares Right female who presents for routine prenatal care at 34w2d  Pregnancy ROS: Denies leakage of fluid or vaginal bleeding  Having occasional lola lofton contractions and cramping that resolve with hydration and rest    Reports normal fetal movement      The following portions of the patient's history were reviewed and updated as appropriate: allergies, current medications, past family history, past medical history, obstetric history, gynecologic history, past social history, past surgical history and problem list       Objective:  /76   Wt 85 9 kg (189 lb 6 4 oz)   LMP 10/25/2020 (Exact Date)   BMI 34 64 kg/m²   Pregravid Weight/BMI: 88 5 kg (195 lb) (BMI 35 66)  Current Weight: 85 9 kg (189 lb 6 4 oz)   Total Weight Gain: -2 54 kg (-5 lb 9 6 oz)   Pre- Vitals      Most Recent Value   Prenatal Assessment   Fetal Heart Rate  146   Movement  Present   Prenatal Vitals   Blood Pressure  116/76   Weight - Scale  85 9 kg (189 lb 6 4 oz)   Urine Albumin/Glucose   Dilation/Effacement/Station   Vaginal Drainage   Edema   LLE Edema  None   RLE Edema  None General: Well appearing, no distress  Respiratory: Unlabored breathing  Cardiovascular: Regular rate  Abdomen: Soft, gravid, nontender  Fundal Height: Appropriate for gestational age  Extremities: Warm and well perfused  Non tender        Lui Joy MD  [address] [place] [address]  6/22/2021 4:14 PM

## 2021-06-22 NOTE — ASSESSMENT & PLAN NOTE
- Left-sided unilateral renal agenesis, s/p consult with Dr Manpreet Gates; recommend renal ultrasound after delivery

## 2021-06-22 NOTE — ASSESSMENT & PLAN NOTE
- GBS next visit  - Reviewed most recent fetal US, 30%ile, vertex, no further scans indicated  - Delivery Plan: Desires to await spontaneous labor  - Feeding: Breastfeeding  - Patient Education: Fetal kick counts and labor precautions reviewed  Perineal massage education reviewed

## 2021-07-06 ENCOUNTER — ROUTINE PRENATAL (OUTPATIENT)
Dept: OBGYN CLINIC | Facility: MEDICAL CENTER | Age: 36
End: 2021-07-06

## 2021-07-06 VITALS — BODY MASS INDEX: 34.57 KG/M2 | WEIGHT: 189 LBS | DIASTOLIC BLOOD PRESSURE: 70 MMHG | SYSTOLIC BLOOD PRESSURE: 115 MMHG

## 2021-07-06 DIAGNOSIS — Z34.93 THIRD TRIMESTER PREGNANCY: ICD-10-CM

## 2021-07-06 DIAGNOSIS — Z3A.36 36 WEEKS GESTATION OF PREGNANCY: Primary | ICD-10-CM

## 2021-07-06 DIAGNOSIS — O35.8XX0 RENAL AGENESIS OF FETUS AFFECTING ANTEPARTUM CARE OF MOTHER, SINGLE OR UNSPECIFIED FETUS: ICD-10-CM

## 2021-07-06 PROCEDURE — 87150 DNA/RNA AMPLIFIED PROBE: CPT | Performed by: OBSTETRICS & GYNECOLOGY

## 2021-07-06 PROCEDURE — PNV: Performed by: OBSTETRICS & GYNECOLOGY

## 2021-07-06 NOTE — PROGRESS NOTES
Assessment  37 y o   at 36w2d presenting for routine prenatal visit  Plan  Diagnoses and all orders for this visit:    36 weeks gestation of pregnancy  Third trimester pregnancy  -  labor precautions  - FKC  - GBS collected  - Return in 1wk for PN    Renal agenesis of fetus affecting antepartum care of mother, single or unspecified fetus  - Postpartum follow up (US post-delivery)   - s/p consult with Peds Nephro      ____________________________________________________________        Subjective    Pam Lu is a 37 y o  Robbin Lord at 36w2d who presents for routine prenatal visit  She is reporting loss of mucus plug  Had a large amt of clear, thick discharge on toilet tissue with wiping  None since  Not similar in appearance to last pregnancy  Has some period-like cramping  Denies contractions, loss of fluid, or vaginal bleeding  She feels regular fetal movements  Objective  /70   Wt 85 7 kg (189 lb)   LMP 10/25/2020 (Exact Date)   BMI 34 57 kg/m²     FHT: 144 BPM   Uterine Size: 35 cm   Presentations: cephalic   Pelvic Exam:     Dilation: 3cm    Effacement: 50%    Station:  -2    Consistency: soft    Position: anterior     Physical Exam:  Physical Exam  Constitutional:       General: She is not in acute distress  Appearance: Normal appearance  She is well-developed  She is not ill-appearing, toxic-appearing or diaphoretic  HENT:      Head: Normocephalic and atraumatic  Eyes:      General: No scleral icterus  Right eye: No discharge  Left eye: No discharge  Conjunctiva/sclera: Conjunctivae normal    Pulmonary:      Effort: Pulmonary effort is normal  No accessory muscle usage or respiratory distress  Abdominal:      General: There is distension (gravid)  Tenderness: There is no abdominal tenderness  There is no guarding or rebound  Skin:     General: Skin is warm and dry  Coloration: Skin is not jaundiced        Findings: No bruising, erythema or rash  Neurological:      Mental Status: She is alert  Psychiatric:         Mood and Affect: Mood normal          Behavior: Behavior normal          Thought Content:  Thought content normal          Judgment: Judgment normal

## 2021-07-08 LAB — GP B STREP DNA SPEC QL NAA+PROBE: NEGATIVE

## 2021-07-13 ENCOUNTER — ROUTINE PRENATAL (OUTPATIENT)
Dept: OBGYN CLINIC | Facility: MEDICAL CENTER | Age: 36
End: 2021-07-13

## 2021-07-13 VITALS — WEIGHT: 189 LBS | BODY MASS INDEX: 34.57 KG/M2 | SYSTOLIC BLOOD PRESSURE: 120 MMHG | DIASTOLIC BLOOD PRESSURE: 60 MMHG

## 2021-07-13 DIAGNOSIS — Z3A.37 37 WEEKS GESTATION OF PREGNANCY: Primary | ICD-10-CM

## 2021-07-13 PROCEDURE — PNV: Performed by: STUDENT IN AN ORGANIZED HEALTH CARE EDUCATION/TRAINING PROGRAM

## 2021-07-13 NOTE — PROGRESS NOTES
Routine Prenatal Visit  [facility] [address] [place] [address]    Assessment/Plan:  Bernard Goodell is a 37y o  year old  at 37w2d who presents for routine prenatal visit  1  37 weeks gestation of pregnancy  Assessment & Plan:  - Reviewed GBS negative  - Most recent US 30%ile, Vertex;   - SVE today 3/75/-2 soft, midposition, vertex head well applied  - Breastfeeding  - Delivery plan: desires to await spontaneous labor, may desire membrane stripping after 39 weeks          Subjective:     CC: Prenatal care    Lavell Fernandez is a 37 y o   female who presents for routine prenatal care at 37w2d  Pregnancy ROS: Denies leakage of fluid, pelvic pain, or vaginal bleeding  Reports normal fetal movement  The following portions of the patient's history were reviewed and updated as appropriate: allergies, current medications, past family history, past medical history, obstetric history, gynecologic history, past social history, past surgical history and problem list       Objective:  /60   Wt 85 7 kg (189 lb)   LMP 10/25/2020 (Exact Date)   BMI 34 57 kg/m²   Pregravid Weight/BMI: 88 5 kg (195 lb) (BMI 35 66)  Current Weight: 85 7 kg (189 lb)   Total Weight Gain: -2 722 kg (-6 lb)   Pre- Vitals      Most Recent Value   Prenatal Assessment   Fetal Heart Rate  136   Movement  Present   Prenatal Vitals   Blood Pressure  120/60   Weight - Scale  85 7 kg (189 lb)   Urine Albumin/Glucose   Dilation/Effacement/Station   Vaginal Drainage   Edema   LLE Edema  Trace   RLE Edema  Trace           General: Well appearing, no distress  Respiratory: Unlabored breathing  Cardiovascular: Regular rate  Abdomen: Soft, gravid, nontender  Fundal Height: Appropriate for gestational age  Extremities: Warm and well perfused  Non tender      Adore Pandya MD  [facility]  2021 3:39 PM

## 2021-07-13 NOTE — ASSESSMENT & PLAN NOTE
- Reviewed GBS negative  - Most recent US 30%ile, Vertex;   - SVE today 3/75/-2 soft, midposition, vertex head well applied  - Breastfeeding  - Delivery plan: desires to await spontaneous labor, may desire membrane stripping after 39 weeks

## 2021-07-21 ENCOUNTER — ROUTINE PRENATAL (OUTPATIENT)
Dept: OBGYN CLINIC | Facility: MEDICAL CENTER | Age: 36
End: 2021-07-21

## 2021-07-21 VITALS — SYSTOLIC BLOOD PRESSURE: 110 MMHG | BODY MASS INDEX: 34.75 KG/M2 | DIASTOLIC BLOOD PRESSURE: 70 MMHG | WEIGHT: 190 LBS

## 2021-07-21 DIAGNOSIS — Z3A.38 38 WEEKS GESTATION OF PREGNANCY: Primary | ICD-10-CM

## 2021-07-21 DIAGNOSIS — Z34.93 THIRD TRIMESTER PREGNANCY: ICD-10-CM

## 2021-07-21 DIAGNOSIS — O35.8XX0 RENAL AGENESIS OF FETUS AFFECTING ANTEPARTUM CARE OF MOTHER, SINGLE OR UNSPECIFIED FETUS: ICD-10-CM

## 2021-07-21 PROCEDURE — PNV: Performed by: OBSTETRICS & GYNECOLOGY
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
## 2021-07-28 ENCOUNTER — ROUTINE PRENATAL (OUTPATIENT)
Dept: OBGYN CLINIC | Facility: MEDICAL CENTER | Age: 36
End: 2021-07-28

## 2021-07-28 VITALS — WEIGHT: 188 LBS | BODY MASS INDEX: 34.39 KG/M2 | DIASTOLIC BLOOD PRESSURE: 64 MMHG | SYSTOLIC BLOOD PRESSURE: 110 MMHG

## 2021-07-28 DIAGNOSIS — O35.8XX0 RENAL AGENESIS OF FETUS AFFECTING ANTEPARTUM CARE OF MOTHER, SINGLE OR UNSPECIFIED FETUS: ICD-10-CM

## 2021-07-28 DIAGNOSIS — Z34.93 THIRD TRIMESTER PREGNANCY: ICD-10-CM

## 2021-07-28 DIAGNOSIS — Z3A.39 39 WEEKS GESTATION OF PREGNANCY: Primary | ICD-10-CM

## 2021-07-28 PROCEDURE — PNV: Performed by: OBSTETRICS & GYNECOLOGY
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
## 2021-08-03 ENCOUNTER — NURSE TRIAGE (OUTPATIENT)
Dept: OTHER | Facility: OTHER | Age: 36
End: 2021-08-03

## 2021-08-03 ENCOUNTER — ANESTHESIA (INPATIENT)
Dept: ANESTHESIOLOGY | Facility: HOSPITAL | Age: 36
End: 2021-08-03
Payer: COMMERCIAL

## 2021-08-03 ENCOUNTER — HOSPITAL ENCOUNTER (INPATIENT)
Facility: HOSPITAL | Age: 36
LOS: 2 days | Discharge: HOME | End: 2021-08-05
Attending: OBSTETRICS & GYNECOLOGY | Admitting: OBSTETRICS & GYNECOLOGY
Payer: COMMERCIAL

## 2021-08-03 ENCOUNTER — ANESTHESIA EVENT (INPATIENT)
Dept: ANESTHESIOLOGY | Facility: HOSPITAL | Age: 36
End: 2021-08-03
Payer: COMMERCIAL

## 2021-08-03 DIAGNOSIS — Z3A.40 40 WEEKS GESTATION OF PREGNANCY: Primary | ICD-10-CM

## 2021-08-03 DIAGNOSIS — Z30.011 ENCOUNTER FOR INITIAL PRESCRIPTION OF CONTRACEPTIVE PILLS: ICD-10-CM

## 2021-08-03 LAB
ABO GROUP BLD: NORMAL
BASE EXCESS BLDCOA CALC-SCNC: -2.5 MMOL/L (ref 3–11)
BASE EXCESS BLDCOV CALC-SCNC: -3.6 MMOL/L (ref 1–9)
BASOPHILS # BLD AUTO: 0.05 THOUSANDS/ΜL (ref 0–0.1)
BASOPHILS NFR BLD AUTO: 0 % (ref 0–1)
BLD GP AB SCN SERPL QL: NEGATIVE
EOSINOPHIL # BLD AUTO: 0.06 THOUSAND/ΜL (ref 0–0.61)
EOSINOPHIL NFR BLD AUTO: 1 % (ref 0–6)
ERYTHROCYTE [DISTWIDTH] IN BLOOD BY AUTOMATED COUNT: 14.8 % (ref 11.6–15.1)
HCO3 BLDCOA-SCNC: 20.1 MMOL/L (ref 17.3–27.3)
HCO3 BLDCOV-SCNC: 20.2 MMOL/L (ref 12.2–28.6)
HCT VFR BLD AUTO: 35.1 % (ref 34.8–46.1)
HGB BLD-MCNC: 11.5 G/DL (ref 11.5–15.4)
IMM GRANULOCYTES # BLD AUTO: 0.05 THOUSAND/UL (ref 0–0.2)
IMM GRANULOCYTES NFR BLD AUTO: 0 % (ref 0–2)
LYMPHOCYTES # BLD AUTO: 3.57 THOUSANDS/ΜL (ref 0.6–4.47)
LYMPHOCYTES NFR BLD AUTO: 31 % (ref 14–44)
MCH RBC QN AUTO: 25.8 PG (ref 26.8–34.3)
MCHC RBC AUTO-ENTMCNC: 32.8 G/DL (ref 31.4–37.4)
MCV RBC AUTO: 79 FL (ref 82–98)
MONOCYTES # BLD AUTO: 0.44 THOUSAND/ΜL (ref 0.17–1.22)
MONOCYTES NFR BLD AUTO: 4 % (ref 4–12)
NEUTROPHILS # BLD AUTO: 7.54 THOUSANDS/ΜL (ref 1.85–7.62)
NEUTS SEG NFR BLD AUTO: 64 % (ref 43–75)
NRBC BLD AUTO-RTO: 0 /100 WBCS
OXYHGB MFR BLDCOA: 89.1 %
OXYHGB MFR BLDCOV: 93.3 %
PCO2 BLDCOA: 29.9 MM[HG] (ref 30–60)
PCO2 BLDCOV: 33.3 MM HG (ref 27–43)
PH BLDCOA: 7.45 [PH] (ref 7.23–7.43)
PH BLDCOV: 7.4 [PH] (ref 7.19–7.49)
PLATELET # BLD AUTO: 400 THOUSANDS/UL (ref 149–390)
PMV BLD AUTO: 10.7 FL (ref 8.9–12.7)
PO2 BLDCOV: 52.7 MM HG (ref 15–45)
RBC # BLD AUTO: 4.45 MILLION/UL (ref 3.81–5.12)
RH BLD: POSITIVE
RPR SER QL: NORMAL
SAO2 % BLDCOV: 20 ML/DL
SPECIMEN EXPIRATION DATE: NORMAL
WBC # BLD AUTO: 11.71 THOUSAND/UL (ref 4.31–10.16)

## 2021-08-03 PROCEDURE — 99202 OFFICE O/P NEW SF 15 MIN: CPT

## 2021-08-03 PROCEDURE — 99024 POSTOP FOLLOW-UP VISIT: CPT | Performed by: OBSTETRICS & GYNECOLOGY

## 2021-08-03 PROCEDURE — 0HQ9XZZ REPAIR PERINEUM SKIN, EXTERNAL APPROACH: ICD-10-PCS | Performed by: OBSTETRICS & GYNECOLOGY

## 2021-08-03 PROCEDURE — 82805 BLOOD GASES W/O2 SATURATION: CPT | Performed by: OBSTETRICS & GYNECOLOGY

## 2021-08-03 PROCEDURE — 59400 OBSTETRICAL CARE: CPT | Performed by: OBSTETRICS & GYNECOLOGY

## 2021-08-03 PROCEDURE — NC001 PR NO CHARGE: Performed by: OBSTETRICS & GYNECOLOGY

## 2021-08-03 PROCEDURE — 86850 RBC ANTIBODY SCREEN: CPT | Performed by: OBSTETRICS & GYNECOLOGY

## 2021-08-03 PROCEDURE — 86901 BLOOD TYPING SEROLOGIC RH(D): CPT | Performed by: OBSTETRICS & GYNECOLOGY

## 2021-08-03 PROCEDURE — 86900 BLOOD TYPING SEROLOGIC ABO: CPT | Performed by: OBSTETRICS & GYNECOLOGY

## 2021-08-03 PROCEDURE — 4A1HXCZ MONITORING OF PRODUCTS OF CONCEPTION, CARDIAC RATE, EXTERNAL APPROACH: ICD-10-PCS | Performed by: OBSTETRICS & GYNECOLOGY

## 2021-08-03 PROCEDURE — 86592 SYPHILIS TEST NON-TREP QUAL: CPT | Performed by: OBSTETRICS & GYNECOLOGY

## 2021-08-03 PROCEDURE — 85025 COMPLETE CBC W/AUTO DIFF WBC: CPT | Performed by: OBSTETRICS & GYNECOLOGY

## 2021-08-03 RX ORDER — ROPIVACAINE HYDROCHLORIDE 2 MG/ML
INJECTION, SOLUTION EPIDURAL; INFILTRATION; PERINEURAL AS NEEDED
Status: DISCONTINUED | OUTPATIENT
Start: 2021-08-03 | End: 2021-08-03 | Stop reason: HOSPADM

## 2021-08-03 RX ORDER — CALCIUM CARBONATE 200(500)MG
1000 TABLET,CHEWABLE ORAL DAILY PRN
Status: DISCONTINUED | OUTPATIENT
Start: 2021-08-03 | End: 2021-08-05 | Stop reason: HOSPADM

## 2021-08-03 RX ORDER — SIMETHICONE 80 MG
80 TABLET,CHEWABLE ORAL 4 TIMES DAILY PRN
Status: DISCONTINUED | OUTPATIENT
Start: 2021-08-03 | End: 2021-08-05 | Stop reason: HOSPADM

## 2021-08-03 RX ORDER — ONDANSETRON 2 MG/ML
4 INJECTION INTRAMUSCULAR; INTRAVENOUS EVERY 8 HOURS PRN
Status: DISCONTINUED | OUTPATIENT
Start: 2021-08-03 | End: 2021-08-05 | Stop reason: HOSPADM

## 2021-08-03 RX ORDER — DOCUSATE SODIUM 100 MG/1
100 CAPSULE, LIQUID FILLED ORAL 2 TIMES DAILY
Status: DISCONTINUED | OUTPATIENT
Start: 2021-08-03 | End: 2021-08-05 | Stop reason: HOSPADM

## 2021-08-03 RX ORDER — ROPIVACAINE HYDROCHLORIDE 2 MG/ML
INJECTION, SOLUTION EPIDURAL; INFILTRATION; PERINEURAL CONTINUOUS PRN
Status: DISCONTINUED | OUTPATIENT
Start: 2021-08-03 | End: 2021-08-03 | Stop reason: HOSPADM

## 2021-08-03 RX ORDER — DIPHENHYDRAMINE HYDROCHLORIDE 50 MG/ML
25 INJECTION INTRAMUSCULAR; INTRAVENOUS EVERY 6 HOURS PRN
Status: DISCONTINUED | OUTPATIENT
Start: 2021-08-03 | End: 2021-08-04

## 2021-08-03 RX ORDER — ACETAMINOPHEN 325 MG/1
650 TABLET ORAL EVERY 6 HOURS PRN
Status: DISCONTINUED | OUTPATIENT
Start: 2021-08-03 | End: 2021-08-03

## 2021-08-03 RX ORDER — IBUPROFEN 600 MG/1
600 TABLET ORAL EVERY 6 HOURS PRN
Status: DISCONTINUED | OUTPATIENT
Start: 2021-08-03 | End: 2021-08-05 | Stop reason: HOSPADM

## 2021-08-03 RX ORDER — OXYTOCIN/RINGER'S LACTATE 30/500 ML
250 PLASTIC BAG, INJECTION (ML) INTRAVENOUS CONTINUOUS
Status: ACTIVE | OUTPATIENT
Start: 2021-08-03 | End: 2021-08-03

## 2021-08-03 RX ORDER — ACETAMINOPHEN 325 MG/1
650 TABLET ORAL EVERY 4 HOURS PRN
Status: DISCONTINUED | OUTPATIENT
Start: 2021-08-03 | End: 2021-08-05 | Stop reason: HOSPADM

## 2021-08-03 RX ORDER — ONDANSETRON 2 MG/ML
4 INJECTION INTRAMUSCULAR; INTRAVENOUS EVERY 6 HOURS PRN
Status: DISCONTINUED | OUTPATIENT
Start: 2021-08-03 | End: 2021-08-03

## 2021-08-03 RX ORDER — OXYTOCIN/RINGER'S LACTATE 30/500 ML
PLASTIC BAG, INJECTION (ML) INTRAVENOUS
Status: COMPLETED
Start: 2021-08-03 | End: 2021-08-03

## 2021-08-03 RX ORDER — CALCIUM CARBONATE 200(500)MG
1000 TABLET,CHEWABLE ORAL 3 TIMES DAILY PRN
Status: DISCONTINUED | OUTPATIENT
Start: 2021-08-03 | End: 2021-08-03

## 2021-08-03 RX ORDER — DIAPER,BRIEF,INFANT-TODD,DISP
1 EACH MISCELLANEOUS 4 TIMES DAILY PRN
Status: DISCONTINUED | OUTPATIENT
Start: 2021-08-03 | End: 2021-08-05 | Stop reason: HOSPADM

## 2021-08-03 RX ORDER — SODIUM CHLORIDE, SODIUM LACTATE, POTASSIUM CHLORIDE, CALCIUM CHLORIDE 600; 310; 30; 20 MG/100ML; MG/100ML; MG/100ML; MG/100ML
125 INJECTION, SOLUTION INTRAVENOUS CONTINUOUS
Status: DISCONTINUED | OUTPATIENT
Start: 2021-08-03 | End: 2021-08-03

## 2021-08-03 RX ORDER — LIDOCAINE HYDROCHLORIDE AND EPINEPHRINE 15; 5 MG/ML; UG/ML
INJECTION, SOLUTION EPIDURAL
Status: COMPLETED | OUTPATIENT
Start: 2021-08-03 | End: 2021-08-03

## 2021-08-03 RX ORDER — ROPIVACAINE HYDROCHLORIDE 2 MG/ML
INJECTION, SOLUTION EPIDURAL; INFILTRATION; PERINEURAL
Status: COMPLETED
Start: 2021-08-03 | End: 2021-08-03

## 2021-08-03 RX ADMIN — ROPIVACAINE HYDROCHLORIDE: 2 INJECTION, SOLUTION EPIDURAL; INFILTRATION at 08:05

## 2021-08-03 RX ADMIN — Medication 250 MILLI-UNITS/MIN: at 12:35

## 2021-08-03 RX ADMIN — DOCUSATE SODIUM 100 MG: 100 CAPSULE ORAL at 18:19

## 2021-08-03 RX ADMIN — LIDOCAINE HYDROCHLORIDE AND EPINEPHRINE 3 ML: 15; 5 INJECTION, SOLUTION EPIDURAL at 08:02

## 2021-08-03 RX ADMIN — SODIUM CHLORIDE, SODIUM LACTATE, POTASSIUM CHLORIDE, AND CALCIUM CHLORIDE 999 ML/HR: .6; .31; .03; .02 INJECTION, SOLUTION INTRAVENOUS at 06:59

## 2021-08-03 RX ADMIN — ROPIVACAINE HYDROCHLORIDE 10 ML/HR: 2 INJECTION, SOLUTION EPIDURAL; INFILTRATION; PERINEURAL at 08:13

## 2021-08-03 RX ADMIN — IBUPROFEN 600 MG: 600 TABLET, FILM COATED ORAL at 18:19

## 2021-08-03 RX ADMIN — ROPIVACAINE HYDROCHLORIDE 5 MG: 2 INJECTION, SOLUTION EPIDURAL; INFILTRATION at 08:05

## 2021-08-03 RX ADMIN — ROPIVACAINE HYDROCHLORIDE 5 MG: 2 INJECTION, SOLUTION EPIDURAL; INFILTRATION at 08:02

## 2021-08-03 RX ADMIN — CALCIUM CARBONATE (ANTACID) CHEW TAB 500 MG 1000 MG: 500 CHEW TAB at 13:03

## 2021-08-03 NOTE — DISCHARGE SUMMARY
Discharge Summary - OB/GYN  Doc Kennedy 37 y o  female MRN: [id number]  Unit/Bed#: L&D 322-01 Encounter: [id number]    Admission Date: 8/3/2021     Discharge Date: 21    Admitting Attending: Latanya Hernandez DO    Delivering Attending: Dr Tangela Maldonado    Discharging Attending: Dr Sami Morley    Principal Diagnosis: Pregnancy at 40w2d    Secondary Diagnosis:   1  Spontaneous rupture of membranes  2  Spontaneous labor  3  Fetal renal agenesis    Procedures: spontaneous vaginal delivery    Anesthesia: epidural    Hospital course: Doc Kennedy is a 37 y o   at 40w2d who was initially admitted for labor  Her pregnancy was complicated by fetal renal agenesis  On exam in triage she was noted to be 5/90/-2  She was admitted for labor  She delivered a viable male  on 8/3/2021 at 80  Weight 8lbs 5 7oz via normal spontaneous vaginal delivery  She sustained a 1st degree laceration during delivery which was adequately repaired  Apgars were 9 (1 min) and 9 (5 min)   was transferred to  nursery  Patient tolerated the procedure well  Her post-delivery course was uncomplicated  Her postpartum pain was well controlled with oral analgesics  On day of discharge, she was ambulating and able to reasonably perform all ADLs  She was voiding and had appropriate bowel function  Pain was well controlled  She was discharged home on postpartum day #2 without complications  Patient was instructed to follow up with her OB as an outpatient and was given appropriate warnings to call provider if she develops signs of infection or uncontrolled pain  Complications: none apparent    Condition at discharge: good     Discharge instructions/Information to patient and family:   -Do not place anything (no partner, tampons or douche) in your vagina for 6 weeks    -You may walk for exercise for the first 6 weeks then gradually return to your usual activities    -Please do not drive for 1 week if you have no stitches and for 2 weeks if you have stitches or underwent a  delivery     -You may take baths or shower per your preference    -Please look at your bust (breasts) in the mirror daily and call your doctor for redness or tenderness or increased warmth  - If you have had a  section please look at your incision daily as well and call provider for increasing redness or steady drainage from the incision    -Please call your doctor's office if temperature > 100 4*F or 38* C, worsening pain or a foul discharge   -See after visit summary for more information for patient and family  Provisions for Follow-Up Care:  See after visit summary for information related to follow-up care and any pertinent home health orders  Disposition: See After Visit Summary for discharge disposition information  Planned Readmission: No    Discharge medications and instructions:   Prenatal vitamin daily for 6 months or the duration of nursing, whichever is longer    Motrin 600 mg orally every 6 hours as needed for pain  Tylenol (over the counter) per bottle directions as needed for pain  Hydrocortisone cream 1% (over the counter) applied 1-2x daily to perineum as needed  Witch hazel pads for perineal discomfort as needed

## 2021-08-03 NOTE — LACTATION NOTE
This note was copied from a baby's chart  Met with mother  Provided mother with Ready, Set, Baby booklet  Discussed Skin to Skin contact an benefits to mom and baby  Talked about the delay of the first bath until baby has adjusted  Spoke about the benefits of rooming in  Feeding on cue and what that means for recognizing infant's hunger  Avoidance of pacifiers for the first month discussed  Talked about exclusive breastfeeding for the first 6 months  Positioning and latch reviewed as well as showing images of other feeding positions  Discussed the properties of a good latch in any position  Reviewed hand/manual expression  Discussed s/s that baby is getting enough milk and some s/s that breastfeeding dyad may need further help  Gave information on common concerns, what to expect the first few weeks after delivery, preparing for other caregivers, and how partners can help  Resources for support also provided  Baby skin to skin in cradle hold at time of visit with signs of deep latch and mom expressed comfort with latch and position  Demo with teach  expression  Mom able to express large drops of colostrum to facilitate latch  Encouraged parents to call for assistance, questions, and concerns about breastfeeding  Extension provided

## 2021-08-03 NOTE — PLAN OF CARE
Problem: Knowledge Deficit  Goal: Verbalizes understanding of labor plan  Description: Assess patient/family/caregiver's baseline knowledge level and ability to understand information  Provide education via patient/family/caregiver's preferred learning method at appropriate level of understanding  1  Provide teaching at level of understanding  2  Provide teaching via preferred learning method(s)  Outcome: Progressing  Goal: Patient/family/caregiver demonstrates understanding of disease process, treatment plan, medications, and discharge instructions  Description: Complete learning assessment and assess knowledge base  Interventions:  - Provide teaching at level of understanding  - Provide teaching via preferred learning methods  Outcome: Progressing     Problem: Labor & Delivery  Goal: Manages discomfort  Description: Assess and monitor for signs and symptoms of discomfort  Assess patient's pain level regularly and per hospital policy  Administer medications as ordered  Support use of nonpharmacological methods to help control pain such as distraction, imagery, relaxation, and application of heat and cold  Collaborate with interdisciplinary team and patient to determine appropriate pain management plan  1  Include patient in decisions related to comfort  2  Offer non-pharmacological pain management interventions  3  Report ineffective pain management to physician  Outcome: Progressing  Goal: Patient vital signs are stable  Description: 1  Assess vital signs - vaginal delivery    Outcome: Progressing     Problem: PAIN - ADULT  Goal: Verbalizes/displays adequate comfort level or baseline comfort level  Description: Interventions:  - Encourage patient to monitor pain and request assistance  - Assess pain using appropriate pain scale  - Administer analgesics based on type and severity of pain and evaluate response  - Implement non-pharmacological measures as appropriate and evaluate response  - Consider cultural and social influences on pain and pain management  - Notify physician/advanced practitioner if interventions unsuccessful or patient reports new pain  Outcome: Progressing     Problem: INFECTION - ADULT  Goal: Absence or prevention of progression during hospitalization  Description: INTERVENTIONS:  - Assess and monitor for signs and symptoms of infection  - Monitor lab/diagnostic results  - Monitor all insertion sites, i e  indwelling lines, tubes, and drains  - Monitor endotracheal if appropriate and nasal secretions for changes in amount and color  - [place] appropriate cooling/warming therapies per order  - Administer medications as ordered  - Instruct and encourage patient and family to use good hand hygiene technique  - Identify and instruct in appropriate isolation precautions for identified infection/condition  Outcome: Progressing  Goal: Absence of fever/infection during neutropenic period  Description: INTERVENTIONS:  - Monitor WBC    Outcome: Progressing     Problem: SAFETY ADULT  Goal: Patient will remain free of falls  Description: INTERVENTIONS:  - Educate patient/family on patient safety including physical limitations  - Instruct patient to call for assistance with activity   - Consult OT/PT to assist with strengthening/mobility   - Keep Call bell within reach  - Keep bed low and locked with side rails adjusted as appropriate  - Keep care items and personal belongings within reach  - Initiate and maintain comfort rounds  - Make Fall Risk Sign visible to staff  - Offer Toileting every  Hours, in advance of need  - Obtain necessary fall risk management equipment  - Apply yellow socks and bracelet for high fall risk patients  - Consider moving patient to room near nurses station  Outcome: Progressing  Goal: Maintain or return to baseline ADL function  Description: INTERVENTIONS:  -  Assess patient's ability to carry out ADLs; assess patient's baseline for ADL function and identify physical deficits which impact ability to perform ADLs (bathing, care of mouth/teeth, toileting, grooming, dressing, etc )  - Assess/evaluate cause of self-care deficits   - Assess range of motion  - Assess patient's mobility; develop plan if impaired  - Assess patient's need for assistive devices and provide as appropriate  - Encourage maximum independence but intervene and supervise when necessary  - Involve family in performance of ADLs  - Assess for home care needs following discharge   - Consider OT consult to assist with ADL evaluation and planning for discharge  - Provide patient education as appropriate  Outcome: Progressing  Goal: Maintains/Returns to pre admission functional level  Description: INTERVENTIONS:  - Perform BMAT or MOVE assessment daily    - Set and communicate daily mobility goal to care team and patient/family/caregiver     - Collaborate with rehabilitation services on mobility goals if consulted  - Perform Range of Motion   - Reposition patient  - Dangle patient   - Stand patient   - Ambulate patient  - Out of bed to chair   - Out of bed for meals  - Out of bed for toileting  - Record patient progress and toleration of activity level   Outcome: Progressing     Problem: DISCHARGE PLANNING  Goal: Discharge to home or other facility with appropriate resources  Description: INTERVENTIONS:  - Identify barriers to discharge w/patient and caregiver  - Arrange for needed discharge resources and transportation as appropriate  - Identify discharge learning needs (meds, wound care, etc )  - Arrange for interpretive services to assist at discharge as needed  - Refer to Case Management Department for coordinating discharge planning if the patient needs post-hospital services based on physician/advanced practitioner order or complex needs related to functional status, cognitive ability, or social support system  Outcome: Progressing     Problem: Knowledge Deficit  Goal: Verbalizes understanding of labor plan  Description: Assess patient/family/caregiver's baseline knowledge level and ability to understand information  Provide education via patient/family/caregiver's preferred learning method at appropriate level of understanding  1  Provide teaching at level of understanding  2  Provide teaching via preferred learning method(s)  Outcome: Progressing  Goal: Patient/family/caregiver demonstrates understanding of disease process, treatment plan, medications, and discharge instructions  Description: Complete learning assessment and assess knowledge base  Interventions:  - Provide teaching at level of understanding  - Provide teaching via preferred learning methods  Outcome: Progressing     Problem: Labor & Delivery  Goal: Manages discomfort  Description: Assess and monitor for signs and symptoms of discomfort  Assess patient's pain level regularly and per hospital policy  Administer medications as ordered  Support use of nonpharmacological methods to help control pain such as distraction, imagery, relaxation, and application of heat and cold  Collaborate with interdisciplinary team and patient to determine appropriate pain management plan  1  Include patient in decisions related to comfort  2  Offer non-pharmacological pain management interventions  3  Report ineffective pain management to physician  Outcome: Progressing  Goal: Patient vital signs are stable  Description: 1  Assess vital signs - vaginal delivery    Outcome: Progressing     Problem: PAIN - ADULT  Goal: Verbalizes/displays adequate comfort level or baseline comfort level  Description: Interventions:  - Encourage patient to monitor pain and request assistance  - Assess pain using appropriate pain scale  - Administer analgesics based on type and severity of pain and evaluate response  - Implement non-pharmacological measures as appropriate and evaluate response  - Consider cultural and social influences on pain and pain management  - Notify physician/advanced practitioner if interventions unsuccessful or patient reports new pain  Outcome: Progressing     Problem: INFECTION - ADULT  Goal: Absence or prevention of progression during hospitalization  Description: INTERVENTIONS:  - Assess and monitor for signs and symptoms of infection  - Monitor lab/diagnostic results  - Monitor all insertion sites, i e  indwelling lines, tubes, and drains  - Monitor endotracheal if appropriate and nasal secretions for changes in amount and color  - [place] appropriate cooling/warming therapies per order  - Administer medications as ordered  - Instruct and encourage patient and family to use good hand hygiene technique  - Identify and instruct in appropriate isolation precautions for identified infection/condition  Outcome: Progressing  Goal: Absence of fever/infection during neutropenic period  Description: INTERVENTIONS:  - Monitor WBC    Outcome: Progressing     Problem: SAFETY ADULT  Goal: Patient will remain free of falls  Description: INTERVENTIONS:  - Educate patient/family on patient safety including physical limitations  - Instruct patient to call for assistance with activity   - Consult OT/PT to assist with strengthening/mobility   - Keep Call bell within reach  - Keep bed low and locked with side rails adjusted as appropriate  - Keep care items and personal belongings within reach  - Initiate and maintain comfort rounds  - Make Fall Risk Sign visible to staff  - Offer Toileting   - Obtain necessary fall risk management equipment  - Apply yellow socks and bracelet for high fall risk patients  - Consider moving patient to room near nurses station  Outcome: Progressing  Goal: Maintain or return to baseline ADL function  Description: INTERVENTIONS:  -  Assess patient's ability to carry out ADLs; assess patient's baseline for ADL function and identify physical deficits which impact ability to perform ADLs (bathing, care of mouth/teeth, toileting, grooming, dressing, etc )  - Assess/evaluate cause of self-care deficits   - Assess range of motion  - Assess patient's mobility; develop plan if impaired  - Assess patient's need for assistive devices and provide as appropriate  - Encourage maximum independence but intervene and supervise when necessary  - Involve family in performance of ADLs  - Assess for home care needs following discharge   - Consider OT consult to assist with ADL evaluation and planning for discharge  - Provide patient education as appropriate  Outcome: Progressing  Goal: Maintains/Returns to pre admission functional level  Description: INTERVENTIONS:  - Perform BMAT or MOVE assessment daily    - Set and communicate daily mobility goal to care team and patient/family/caregiver     - Collaborate with rehabilitation services on mobility goals if consulted  - Perform Range of Motion   - Reposition patient   - Dangle patient  - Stand patient   - Ambulate patient   - Out of bed to chair   - Out of bed for meals  - Out of bed for toileting  - Record patient progress and toleration of activity level   Outcome: Progressing     Problem: DISCHARGE PLANNING  Goal: Discharge to home or other facility with appropriate resources  Description: INTERVENTIONS:  - Identify barriers to discharge w/patient and caregiver  - Arrange for needed discharge resources and transportation as appropriate  - Identify discharge learning needs (meds, wound care, etc )  - Arrange for interpretive services to assist at discharge as needed  - Refer to Case Management Department for coordinating discharge planning if the patient needs post-hospital services based on physician/advanced practitioner order or complex needs related to functional status, cognitive ability, or social support system  Outcome: Progressing
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
## 2021-08-03 NOTE — OB LABOR/OXYTOCIN SAFETY PROGRESS
Labor Progress Note - Joann Mota 37 y o  female MRN: [id number]    Unit/Bed#: L&D 322-01 Encounter: [id number]       Contraction Frequency (minutes): 4-5  Contraction Quality: Mild  Tachysystole: No   Cervical Dilation: 7        Cervical Effacement: 90  Fetal Station: -1  Baseline Rate: 135 bpm  Fetal Heart Rate: 132 BPM  FHR Category: Category I          Vital Signs:   Vitals:    08/03/21 0932   BP: 104/54   Pulse: 81   Resp:    Temp:        Notes/comments:  Patient is comfortable and is feeling inconsistent pressure  Patient is currently sitting in high Guillaume's after having the peanut ball for some time  Patient's strip has been category 1 with contractions every 4-5 minutes  Will reassess as clinically indicated          Luke Cano 8/3/2021 10:07 AM

## 2021-08-03 NOTE — TELEPHONE ENCOUNTER
Regarding: Possible Labor  ----- Message from [facility] sent at 8/3/2021  5:08 AM EDT -----  "My water just broke and I am 40 wks pregnant "

## 2021-08-03 NOTE — PLAN OF CARE
Problem: PAIN - ADULT  Goal: Verbalizes/displays adequate comfort level or baseline comfort level  Description: Interventions:  - Encourage patient to monitor pain and request assistance  - Assess pain using appropriate pain scale  - Administer analgesics based on type and severity of pain and evaluate response  - Implement non-pharmacological measures as appropriate and evaluate response  - Consider cultural and social influences on pain and pain management  - Notify physician/advanced practitioner if interventions unsuccessful or patient reports new pain  8/3/2021 1243 by Tiana Ventura RN  Outcome: Progressing  8/3/2021 0840 by Tiana Ventura RN  Outcome: Progressing     Problem: INFECTION - ADULT  Goal: Absence or prevention of progression during hospitalization  Description: INTERVENTIONS:  - Assess and monitor for signs and symptoms of infection  - Monitor lab/diagnostic results  - Monitor all insertion sites, i e  indwelling lines, tubes, and drains  - Monitor endotracheal if appropriate and nasal secretions for changes in amount and color  - [place] appropriate cooling/warming therapies per order  - Administer medications as ordered  - Instruct and encourage patient and family to use good hand hygiene technique  - Identify and instruct in appropriate isolation precautions for identified infection/condition  8/3/2021 1243 by Tiana Ventura RN  Outcome: Progressing  8/3/2021 0840 by Tiana Ventura RN  Outcome: Progressing  Goal: Absence of fever/infection during neutropenic period  Description: INTERVENTIONS:  - Monitor WBC    8/3/2021 1243 by Tiana Ventura RN  Outcome: Progressing  8/3/2021 0840 by Tiana Ventura RN  Outcome: Progressing     Problem: SAFETY ADULT  Goal: Patient will remain free of falls  Description: INTERVENTIONS:  - Educate patient/family on patient safety including physical limitations  - Instruct patient to call for assistance with activity   - Consult OT/PT to assist with strengthening/mobility   - Keep Call bell within reach  - Keep bed low and locked with side rails adjusted as appropriate  - Keep care items and personal belongings within reach  - Initiate and maintain comfort rounds  - Make Fall Risk Sign visible to staff  - Offer [address] necessary fall risk management equipment  - Apply yellow socks and bracelet for high fall risk patients  - Consider moving patient to room near nurses station  8/3/2021 1243 by Jonnie Emmanuel RN  Outcome: Progressing  8/3/2021 0840 by Jonnie Emmanuel RN  Outcome: Progressing  Goal: Maintain or return to baseline ADL function  Description: INTERVENTIONS:  -  Assess patient's ability to carry out ADLs; assess patient's baseline for ADL function and identify physical deficits which impact ability to perform ADLs (bathing, care of mouth/teeth, toileting, grooming, dressing, etc )  - Assess/evaluate cause of self-care deficits   - Assess range of motion  - Assess patient's mobility; develop plan if impaired  - Assess patient's need for assistive devices and provide as appropriate  - Encourage maximum independence but intervene and supervise when necessary  - Involve family in performance of ADLs  - Assess for home care needs following discharge   - Consider OT consult to assist with ADL evaluation and planning for discharge  - Provide patient education as appropriate  8/3/2021 1243 by Jonnie Emmanuel RN  Outcome: Progressing  8/3/2021 0840 by Jonnie Emmanuel RN  Outcome: Progressing  Goal: Maintains/Returns to pre admission functional level  Description: INTERVENTIONS:  - Perform BMAT or MOVE assessment daily    - Set and communicate daily mobility goal to care team and patient/family/caregiver     - Collaborate with rehabilitation services on mobility goals if consulted  - Perform Range of Motion   - Reposition patient   - Dangle patient  - Stand patient   - Ambulate patient   - Out of bed to chair   - Out of bed for meals   - Out of bed for toileting  - Record patient progress and toleration of activity level   8/3/2021 1243 by Dakotah Lin RN  Outcome: Progressing  8/3/2021 0840 by Dakotah Lin RN  Outcome: Progressing     Problem: DISCHARGE PLANNING  Goal: Discharge to home or other facility with appropriate resources  Description: INTERVENTIONS:  - Identify barriers to discharge w/patient and caregiver  - Arrange for needed discharge resources and transportation as appropriate  - Identify discharge learning needs (meds, wound care, etc )  - Arrange for interpretive services to assist at discharge as needed  - Refer to Case Management Department for coordinating discharge planning if the patient needs post-hospital services based on physician/advanced practitioner order or complex needs related to functional status, cognitive ability, or social support system  8/3/2021 1243 by Dakotah Lin, RN  Outcome: Progressing  8/3/2021 0840 by Dakotah Lin, RN  Outcome: Progressing

## 2021-08-03 NOTE — OB LABOR/OXYTOCIN SAFETY PROGRESS
Oxytocin Safety Progress Check Note - Pam Lu 37 y o  female MRN: [id number]    Unit/Bed#: L&D 322-01 Encounter: [id number]       Contraction Frequency (minutes): 5-6  Contraction Quality: Mild  Tachysystole: No   Cervical Dilation: Lip/rim (Comment)  Cervical Effacement: 100  Fetal Station: 0  Baseline Rate: 135 bpm  Fetal Heart Rate: 132 BPM  FHR Category: Category II    Vital Signs:   Vitals:    08/03/21 1149   BP: 102/59   Pulse: 80   Resp:    Temp:      Notes/comments:  1 minute deceleration with heart rate jyoti in 60s  Quickly resolved with maternal repositioning  Patient now anterior lip  Continue to monitor  Discussed with Dr Loren Bello MD 8/3/2021 12:12 PM

## 2021-08-03 NOTE — TELEPHONE ENCOUNTER
Pt is 40 weeks pregnant and is calling in stating her water just broke  Pt with contractions as well  TT out to on call provider to make aware  Per on call provider pt can head into L&D now  Pt made aware

## 2021-08-03 NOTE — OB LABOR/OXYTOCIN SAFETY PROGRESS
Labor Progress Note - Mary Ellen Candelario 37 y o  female MRN: [id number]    Unit/Bed#: L&D 322-01 Encounter: [id number]       Contraction Frequency (minutes): 4-5  Contraction Quality: Mild  Tachysystole: No   Cervical Dilation: 8-9        Cervical Effacement: 90  Fetal Station: 0  Baseline Rate: 130 bpm  Fetal Heart Rate: 132 BPM  FHR Category: Category I          Vital Signs:   Vitals:    08/03/21 1048   BP: 113/73   Pulse: 101   Resp:    Temp:        Notes/comments:  Patient in bed comfortably  On cervical exam she is 8 5/90/0  Patient strips has been category 1 with intermittent early decels  [facility]  Dr Eric Hurley is aware         Sol MD Hermann 8/3/2021 11:12 AM

## 2021-08-03 NOTE — TELEPHONE ENCOUNTER
Reason for Disposition   [1] Leakage of fluid from vagina AND [2] leakage started < 4 hours ago    Answer Assessment - Initial Assessment Questions  1  ONSET: "When did the symptoms begin?"         At 5:00am   2  CONTRACTIONS: "Describe the contractions that you are having " (e g , duration, frequency, regularity, severity)     Started at 5am when water broke  3  MARY: "What date are you expecting to deliver?"      08/01  4  PARITY: "Have you had a baby before?" If Yes, ask: "How long did the labor last?"     2nd baby,   5  FETAL MOVEMENT: "Has the baby's movement decreased or changed significantly from normal?"        6   OTHER SYMPTOMS: "Do you have any other symptoms?" (e g , leaking fluid from vagina, vaginal bleeding, fever, hand/facial swelling)      Vaginal leakage    Prot[facility]

## 2021-08-03 NOTE — ANESTHESIA PROCEDURE NOTES
Epidural Block    Patient location during procedure: OB  Start time: 8/3/2021 8:02 AM  Reason for block: at surgeon's request and primary anesthetic  Staffing  Performed: anesthesiologist   Anesthesiologist: April Brunner DO  Preanesthetic Checklist  Completed: patient identified, IV checked, risks and benefits discussed, surgical consent, monitors and equipment checked, pre-op evaluation and timeout performed  Epidural  Patient position: sitting  Prep: Betadine  Patient monitoring: frequent blood pressure checks  Approach: midline  Location: lumbar  Injection technique: ALEJANDRO saline  Needle  Needle type: Tuohy   Needle gauge: 18 G  Catheter type: side hole  Catheter size: 20 G  Catheter at skin depth: 9 cm  Catheter securement method: clear occlusive dressing  Test dose: negativelidocaine 1 5% with epinephrine 1:200,000 test dose, 3 mLnegative aspiration for CSF, negative aspiration for heme and no paresthesia on injection  patient tolerated the procedure well with no immediate complications

## 2021-08-03 NOTE — OB LABOR/OXYTOCIN SAFETY PROGRESS
Labor Progress Note - Ria Tay 37 y o  female MRN: [id number]    Unit/Bed#: L&D 322-01 Encounter: [id number]       Contraction Frequency (minutes): 1-6  Contraction Quality: Mild  Tachysystole: No   Cervical Dilation: 7        Cervical Effacement: 90  Fetal Station: -1  Baseline Rate: 125 bpm  Fetal Heart Rate: 132 BPM  FHR Category: Category I         Vital Signs:   Vitals:    08/03/21 0832   BP: 113/56   Pulse: 82   Resp:    Temp:        Notes/comments:  Patient is comfortable after epidural  On cervical exam she is 7/90/-1  Patient will be placed with a peanut ball to labor down  Patient strip has been category 1  Will reassess in 2 hours  Discussed with Dr Brian Vel[place], [place] 8/3/2021 9:02 AM

## 2021-08-03 NOTE — ANESTHESIA PREPROCEDURE EVALUATION
Procedure:  LABOR ANALGESIA    Relevant Problems   GI/HEPATIC   (+) Gastroesophageal reflux disease without esophagitis      /RENAL   (+) Renal agenesis of fetus affecting antepartum care of mother      GYN   (+) 40 weeks gestation of pregnancy      PULMONARY   (+) Acute URI        Physical Exam    Airway    Mallampati score: II  TM Distance: >3 FB  Neck ROM: full     Dental   No notable dental hx     Cardiovascular  Cardiovascular exam normal    Pulmonary  Pulmonary exam normal     Other Findings        Anesthesia Plan  ASA Score- 2     Anesthesia Type- epidural with ASA Monitors  Additional Monitors:   Airway Plan:           Plan Factors-    Existing labs reviewed  Patient summary reviewed  Induction-     Postoperative Plan-     Informed Consent- Anesthetic plan and risks discussed with patient

## 2021-08-03 NOTE — L&D DELIVERY NOTE
DELIVERY NOTE  Hayley Nagy 37 y o  female MRN: [id number]  Unit/Bed#: L&D 322-01 Encounter: [id number]    Obstetrician:    Dr Cliff Enciso DO    Assistant:   Dr Minerva Knapp    Pre-Delivery Diagnosis:   1  Pregnancy at 40w2d gestation  2  Spontaneous rupture of membranes  3  Spontaneous labor  4  Fetal renal agenesis    Post-Delivery Diagnosis:   Same as above - Delivered  1st degree laceration    Procedure:  Spontaneous vaginal delivery  Repair of 1st degree laceration    Findings:  1  Viable male  delivered on 8/3/21 at 80 with weight pending at time of dictation,  Apgar scores of 9 at one minute and 9 at five minutes  2  Spontaneous delivery of placenta with centrally  inserted 3-vessel cord  3  Clear amniotic fluid, terminal meconium  4  1st degree perineal laceration, repaired with 3-0 Vicryl Rapide    Specimens:   Cord blood obtained   Placenta; normal appearing, central insertion, intact   Arterial and venous blood gases (below)     Gases:  Umbilical Cord Venous Blood Gas:      Umbilical Cord Arterial Blood Gas:  Results from last 7 days   Lab Units 21  1237   PH COA  7 446*   PCO2 COA  29 9*   HCO3 COA mmol/L 20 1   BASE EXC COA mmol/L -2 5*   O2 HGB, ARTERIAL CORD % 89 1       Quantitative Blood Loss:   315 mL           Complications:    None       Brief labor course:   Hayley Nagy is a 37 y o   at 41w4d  She presented to labor and delivery for spontaneous labor and spontaneous rupture of membrane  Her pregnancy was complicated by fetal renal agenesis  On exam in triage she was noted to be 5/90/-2  She was admitted for labor  Procedure Details      Description of procedure     After pushing for 10 minutes, on 8/3/21 at 1233 patient delivered a viable male , Apgars of 9 (1 min) and 9 (5 min)  The fetal vertex delivered spontaneously  There was no nuchal cord   With the assistance of maternal expulsive efforts and gentle downward traction of the fetal head, the anterior (left) shoulder was delivered without difficulty, followed by the remainder of the infant's body and contralateral arm  After delivery of the , delayed clamping of the umbilical cord was undertaken for 30 seconds  The  was noted to have good tone and cry spontaneously  There was no apparent injury to the   The cord was then doubly clamped and cut and the  was passed off to  staff for routine care  Umbilical cord blood and umbilical artery and venous gases were collected  Placenta was delivered with fundal massage and gentle traction on the cord with active management of the third stage of labor  Placenta delivered intact with a 3-vessel cord  Active management of the third stage of labor was undertaken with IV pitocin at 250milliunits/min  A bimanual exam was performed  Bleeding was noted to be under control   Outcome:  Living  with APGARS 9, 9 at 1 and 5 minutes, respectively   weight pending    Perineal Inspection/Laceration Repair    The vagina, cervix, perineum, and rectum were inspected and there was noted to be a 1st degree laceration that required repair  Laceration was repaired with 3-0 Vicryl rapid with 1 U-stitch and a running suture fashion to reapproximate the laceration  Excellent hemostasis and cosmesis was achieved  Conclusion:  Mother and baby are currently recovering nicely in stable condition  Attending Supervision:   Dr Schuyler Stapleton DO was present for the entire procedure  Cindy Medley MD  OB/GYN PGY-1  8/3/2021  1:00 PM

## 2021-08-04 PROCEDURE — 99024 POSTOP FOLLOW-UP VISIT: CPT | Performed by: OBSTETRICS & GYNECOLOGY

## 2021-08-04 RX ORDER — DIPHENHYDRAMINE HCL 25 MG
25 TABLET ORAL EVERY 6 HOURS PRN
Status: DISCONTINUED | OUTPATIENT
Start: 2021-08-04 | End: 2021-08-05 | Stop reason: HOSPADM

## 2021-08-04 RX ADMIN — IBUPROFEN 600 MG: 600 TABLET, FILM COATED ORAL at 02:33

## 2021-08-04 RX ADMIN — CALCIUM CARBONATE (ANTACID) CHEW TAB 500 MG 1000 MG: 500 CHEW TAB at 23:59

## 2021-08-04 RX ADMIN — DOCUSATE SODIUM 100 MG: 100 CAPSULE ORAL at 17:58

## 2021-08-04 RX ADMIN — CALCIUM CARBONATE (ANTACID) CHEW TAB 500 MG 1000 MG: 500 CHEW TAB at 06:37

## 2021-08-04 RX ADMIN — ACETAMINOPHEN 650 MG: 325 TABLET, FILM COATED ORAL at 14:03

## 2021-08-04 RX ADMIN — IBUPROFEN 600 MG: 600 TABLET, FILM COATED ORAL at 20:11

## 2021-08-04 RX ADMIN — DOCUSATE SODIUM 100 MG: 100 CAPSULE ORAL at 08:57

## 2021-08-04 RX ADMIN — BENZOCAINE AND LEVOMENTHOL: 200; 5 SPRAY TOPICAL at 11:31

## 2021-08-04 RX ADMIN — ACETAMINOPHEN 650 MG: 325 TABLET, FILM COATED ORAL at 07:12

## 2021-08-04 RX ADMIN — WITCH HAZEL 1 PAD: 500 SOLUTION RECTAL; TOPICAL at 11:31

## 2021-08-04 NOTE — PLAN OF CARE
Problem: PAIN - ADULT  Goal: Verbalizes/displays adequate comfort level or baseline comfort level  Description: Interventions:  - Encourage patient to monitor pain and request assistance  - Assess pain using appropriate pain scale  - Administer analgesics based on type and severity of pain and evaluate response  - Implement non-pharmacological measures as appropriate and evaluate response  - Consider cultural and social influences on pain and pain management  - Notify physician/advanced practitioner if interventions unsuccessful or patient reports new pain  Outcome: Progressing     Problem: INFECTION - ADULT  Goal: Absence or prevention of progression during hospitalization  Description: INTERVENTIONS:  - Assess and monitor for signs and symptoms of infection  - Monitor lab/diagnostic results  - Monitor all insertion sites, i e  indwelling lines, tubes, and drains  - Monitor endotracheal if appropriate and nasal secretions for changes in amount and color  - [place] appropriate cooling/warming therapies per order  - Administer medications as ordered  - Instruct and encourage patient and family to use good hand hygiene technique  - Identify and instruct in appropriate isolation precautions for identified infection/condition  Outcome: Progressing  Goal: Absence of fever/infection during neutropenic period  Description: INTERVENTIONS:  - Monitor WBC    Outcome: Progressing     Problem: SAFETY ADULT  Goal: Patient will remain free of falls  Description: INTERVENTIONS:  - Educate patient/family on patient safety including physical limitations  - Instruct patient to call for assistance with activity   - Consult OT/PT to assist with strengthening/mobility   - Keep Call bell within reach  - Keep bed low and locked with side rails adjusted as appropriate  - Keep care items and personal belongings within reach  - Initiate and maintain comfort rounds  - Make Fall Risk Sign visible to staff  - Apply yellow socks and bracelet for high fall risk patients  - Consider moving patient to room near nurses station  Outcome: Progressing  Goal: Maintain or return to baseline ADL function  Description: INTERVENTIONS:  -  Assess patient's ability to carry out ADLs; assess patient's baseline for ADL function and identify physical deficits which impact ability to perform ADLs (bathing, care of mouth/teeth, toileting, grooming, dressing, etc )  - Assess/evaluate cause of self-care deficits   - Assess range of motion  - Assess patient's mobility; develop plan if impaired  - Assess patient's need for assistive devices and provide as appropriate  - Encourage maximum independence but intervene and supervise when necessary  - Involve family in performance of ADLs  - Assess for home care needs following discharge   - Consider OT consult to assist with ADL evaluation and planning for discharge  - Provide patient education as appropriate  Outcome: Progressing  Goal: Maintains/Returns to pre admission functional level  Description: INTERVENTIONS:  - Perform BMAT or MOVE assessment daily    - Set and communicate daily mobility goal to care team and patient/family/caregiver     - Collaborate with rehabilitation services on mobility goals if consulted  - Out of bed for toileting  - Record patient progress and toleration of activity level   Outcome: Progressing     Problem: DISCHARGE PLANNING  Goal: Discharge to home or other facility with appropriate resources  Description: INTERVENTIONS:  - Identify barriers to discharge w/patient and caregiver  - Arrange for needed discharge resources and transportation as appropriate  - Identify discharge learning needs (meds, wound care, etc )  - Arrange for interpretive services to assist at discharge as needed  - Refer to Case Management Department for coordinating discharge planning if the patient needs post-hospital services based on physician/advanced practitioner order or complex needs related to functional status, cognitive ability, or social support system  Outcome: Progressing

## 2021-08-04 NOTE — PLAN OF CARE
Problem: PAIN - ADULT  Goal: Verbalizes/displays adequate comfort level or baseline comfort level  Description: Interventions:  - Encourage patient to monitor pain and request assistance  - Assess pain using appropriate pain scale  - Administer analgesics based on type and severity of pain and evaluate response  - Implement non-pharmacological measures as appropriate and evaluate response  - Consider cultural and social influences on pain and pain management  - Notify physician/advanced practitioner if interventions unsuccessful or patient reports new pain  8/4/2021 1508 by Betito Brown RN  Outcome: Progressing  8/4/2021 0751 by Betito Brown RN  Outcome: Progressing     Problem: INFECTION - ADULT  Goal: Absence or prevention of progression during hospitalization  Description: INTERVENTIONS:  - Assess and monitor for signs and symptoms of infection  - Monitor lab/diagnostic results  - Monitor all insertion sites, i e  indwelling lines, tubes, and drains  - Monitor endotracheal if appropriate and nasal secretions for changes in amount and color  - [place] appropriate cooling/warming therapies per order  - Administer medications as ordered  - Instruct and encourage patient and family to use good hand hygiene technique  - Identify and instruct in appropriate isolation precautions for identified infection/condition  8/4/2021 1508 by Betito Brown RN  Outcome: Progressing  8/4/2021 0751 by Betito Brown RN  Outcome: Progressing  Goal: Absence of fever/infection during neutropenic period  Description: INTERVENTIONS:  - Monitor WBC    8/4/2021 1508 by Betito Brown RN  Outcome: Progressing  8/4/2021 0751 by Betito Brown RN  Outcome: Progressing     Problem: SAFETY ADULT  Goal: Patient will remain free of falls  Description: INTERVENTIONS:  - Educate patient/family on patient safety including physical limitations  - Instruct patient to call for assistance with activity   - Consult OT/PT to assist with strengthening/mobility   - Keep Call bell within reach  - Keep bed low and locked with side rails adjusted as appropriate  - Keep care items and personal belongings within reach  - Initiate and maintain comfort rounds  - Make Fall Risk Sign visible to staff  - Apply yellow socks and bracelet for high fall risk patients  - Consider moving patient to room near nurses station  8/4/2021 1508 by Calvin Solomon RN  Outcome: Progressing  8/4/2021 0751 by Calvin Solomon RN  Outcome: Progressing  Goal: Maintain or return to baseline ADL function  Description: INTERVENTIONS:  -  Assess patient's ability to carry out ADLs; assess patient's baseline for ADL function and identify physical deficits which impact ability to perform ADLs (bathing, care of mouth/teeth, toileting, grooming, dressing, etc )  - Assess/evaluate cause of self-care deficits   - Assess range of motion  - Assess patient's mobility; develop plan if impaired  - Assess patient's need for assistive devices and provide as appropriate  - Encourage maximum independence but intervene and supervise when necessary  - Involve family in performance of ADLs  - Assess for home care needs following discharge   - Consider OT consult to assist with ADL evaluation and planning for discharge  - Provide patient education as appropriate  8/4/2021 1508 by Calvin Solomon RN  Outcome: Progressing  8/4/2021 0751 by Calvin Solomon RN  Outcome: Progressing  Goal: Maintains/Returns to pre admission functional level  Description: INTERVENTIONS:  - Perform BMAT or MOVE assessment daily    - Set and communicate daily mobility goal to care team and patient/family/caregiver     - Collaborate with rehabilitation services on mobility goals if consulted  - Out of bed for toileting  - Record patient progress and toleration of activity level   8/4/2021 1508 by Calvin Solomon RN  Outcome: Progressing  8/4/2021 0751 by Calvin Solomon RN  Outcome: Progressing     Problem: DISCHARGE PLANNING  Goal: Discharge to home or other facility with appropriate resources  Description: INTERVENTIONS:  - Identify barriers to discharge w/patient and caregiver  - Arrange for needed discharge resources and transportation as appropriate  - Identify discharge learning needs (meds, wound care, etc )  - Arrange for interpretive services to assist at discharge as needed  - Refer to Case Management Department for coordinating discharge planning if the patient needs post-hospital services based on physician/advanced practitioner order or complex needs related to functional status, cognitive ability, or social support system  8/4/2021 1508 by Piper Ramsey RN  Outcome: Progressing  8/4/2021 0751 by Piper Ramsey, RN  Outcome: Progressing

## 2021-08-04 NOTE — LACTATION NOTE
This note was copied from a baby's chart  Mom wants to breast/bottle  Dad at bedside  Discussed risks for early supplementation: over feeding, longer digestion times, engorgement for mom, lower milk supply for mom, and nipple confusion  Benefits of breast feeding for infant's intestinal tract, less engorgement for mom, protection from multiple disease processes as infant develops, avoidance of over feeding for infant, less nipple confusion, and increased health benefits for mom  Informed mom we are here to support her breastfeeding goals  Encouraged parents to call for assistance, questions, and concerns about breastfeeding  Extension provided

## 2021-08-04 NOTE — PLAN OF CARE
Problem: PAIN - ADULT  Goal: Verbalizes/displays adequate comfort level or baseline comfort level  Description: Interventions:  - Encourage patient to monitor pain and request assistance  - Assess pain using appropriate pain scale  - Administer analgesics based on type and severity of pain and evaluate response  - Implement non-pharmacological measures as appropriate and evaluate response  - Consider cultural and social influences on pain and pain management  - Notify physician/advanced practitioner if interventions unsuccessful or patient reports new pain  Outcome: Progressing     Problem: INFECTION - ADULT  Goal: Absence or prevention of progression during hospitalization  Description: INTERVENTIONS:  - Assess and monitor for signs and symptoms of infection  - Monitor lab/diagnostic results  - Monitor all insertion sites, i e  indwelling lines, tubes, and drains  - Monitor endotracheal if appropriate and nasal secretions for changes in amount and color  - [place] appropriate cooling/warming therapies per order  - Administer medications as ordered  - Instruct and encourage patient and family to use good hand hygiene technique  - Identify and instruct in appropriate isolation precautions for identified infection/condition  Outcome: Progressing  Goal: Absence of fever/infection during neutropenic period  Description: INTERVENTIONS:  - Monitor WBC    Outcome: Progressing     Problem: SAFETY ADULT  Goal: Patient will remain free of falls  Description: INTERVENTIONS:  - Educate patient/family on patient safety including physical limitations  - Instruct patient to call for assistance with activity   - Consult OT/PT to assist with strengthening/mobility   - Keep Call bell within reach  - Keep bed low and locked with side rails adjusted as appropriate  - Keep care items and personal belongings within reach  - Initiate and maintain comfort rounds  - Make Fall Risk Sign visible to staff  - Offer Toileting every  Hours, in advance of need  - Initiate/Maintain alarm  - Obtain necessary fall risk management equipment:   - Apply yellow socks and bracelet for high fall risk patients  - Consider moving patient to room near nurses station  Outcome: Progressing  Goal: Maintain or return to baseline ADL function  Description: INTERVENTIONS:  -  Assess patient's ability to carry out ADLs; assess patient's baseline for ADL function and identify physical deficits which impact ability to perform ADLs (bathing, care of mouth/teeth, toileting, grooming, dressing, etc )  - Assess/evaluate cause of self-care deficits   - Assess range of motion  - Assess patient's mobility; develop plan if impaired  - Assess patient's need for assistive devices and provide as appropriate  - Encourage maximum independence but intervene and supervise when necessary  - Involve family in performance of ADLs  - Assess for home care needs following discharge   - Consider OT consult to assist with ADL evaluation and planning for discharge  - Provide patient education as appropriate  Outcome: Progressing  Goal: Maintains/Returns to pre admission functional level  Description: INTERVENTIONS:  - Perform BMAT or MOVE assessment daily    - Set and communicate daily mobility goal to care team and patient/family/caregiver  - Collaborate with rehabilitation services on mobility goals if consulted  - Perform Range of Motion  times a day  - Reposition patient every  hours    - Dangle patient  times a day  - Stand patient  times a day  - Ambulate patient  times a day  - Out of bed to chair  times a day   - Out of bed for meals  times a day  - Out of bed for toileting  - Record patient progress and toleration of activity level   Outcome: Progressing     Problem: DISCHARGE PLANNING  Goal: Discharge to home or other facility with appropriate resources  Description: INTERVENTIONS:  - Identify barriers to discharge w/patient and caregiver  - Arrange for needed discharge resources and transportation as appropriate  - Identify discharge learning needs (meds, wound care, etc )  - Arrange for interpretive services to assist at discharge as needed  - Refer to Case Management Department for coordinating discharge planning if the patient needs post-hospital services based on physician/advanced practitioner order or complex needs related to functional status, cognitive ability, or social support system  Outcome: Progressing

## 2021-08-04 NOTE — PROGRESS NOTES
Progress Note - OB/GYN   Patricia Almaraz 37 y o  female MRN: [id number]  Unit/Bed#: L&D 305-01 Encounter: [id number]    Assessment:  37 y o  [id number] s/p   Pregnancy complicated by unilateral renal agenesis of fetus  Patient recovering well, Stable    Plan:  1  Postpartum  Continue routine post partum care  Pain management PRN  Encourage ambulation  Encourage breastfeeding    2   Discharge  Anticipate d/c PPD2      Subjective/Objective   Chief Complaint:    Postpartum state    Subjective:   Pain: yes, cramping, improved with meds  Tolerating PO: yes  Voiding: yes  Flatus: yes  BM: no  Ambulating: yes  Breastfeeding:  yes  Chest pain: no  Shortness of breath: no  Leg pain: no  Lochia: minimal    Objective:     Vitals: Temp:  [97 9 °F (36 6 °C)-98 3 °F (36 8 °C)] 98 °F (36 7 °C)  HR:  [] 73  Resp:  [18-20] 18  BP: ()/(50-84) 103/57       Intake/Output Summary (Last 24 hours) at 2021 3388  Last data filed at 8/3/2021 2015  Gross per 24 hour   Intake 1650 ml   Output 2196 ml   Net -546 ml         Physical Exam:   General: NAD, alert, oriented  Cardio: Regular rate and rhythm, no murmur  Resp: nonlabored breathing, clear to auscultation bilaterally  Abdomen: Soft, no distension/rebound/guarding/tenderness   Fundus: Firm, non-tender, fundus: at the umbilicus   Lower Extremities: Non-tender, no palpable cords    Medications:  Current Facility-Administered Medications   Medication Dose Route Frequency    acetaminophen (TYLENOL) tablet 650 mg  650 mg Oral Q4H PRN    benzocaine-menthol-lanolin-aloe (DERMOPLAST) 20-0 5 % topical spray   Topical 4x Daily PRN    calcium carbonate (TUMS) chewable tablet 1,000 mg  1,000 mg Oral Daily PRN    diphenhydrAMINE (BENADRYL) injection 25 mg  25 mg Intravenous Q6H PRN    docusate sodium (COLACE) capsule 100 mg  100 mg Oral BID    hydrocortisone 1 % cream 1 application  1 application Topical 4x Daily PRN    ibuprofen (MOTRIN) tablet 600 mg  600 mg Oral Q6H PRN    ondansetron (ZOFRAN) injection 4 mg  4 mg Intravenous Q8H PRN    simethicone (MYLICON) chewable tablet 80 mg  80 mg Oral 4x Daily PRN    witch hazel-glycerin (TUCKS) topical pad 1 pad  1 pad Topical Q2H PRN       Labs:   Recent Results (from the past 24 hour(s))   Type and screen    Collection Time: 08/03/21  7:32 AM   Result Value Ref Range    ABO Grouping A     Rh Factor Positive     Antibody Screen Negative     Specimen Expiration Date [id number]    CBC and differential    Collection Time: 08/03/21  7:32 AM   Result Value Ref Range    WBC 11 71 (H) 4 31 - 10 16 Thousand/uL    RBC 4 45 3 81 - 5 12 Million/uL    Hemoglobin 11 5 11 5 - 15 4 g/dL    Hematocrit 35 1 34 8 - 46 1 %    MCV 79 (L) 82 - 98 fL    MCH 25 8 (L) 26 8 - 34 3 pg    MCHC 32 8 31 4 - 37 4 g/dL    RDW 14 8 11 6 - 15 1 %    MPV 10 7 8 9 - 12 7 fL    Platelets 583 (H) 295 - 390 Thousands/uL    nRBC 0 /100 WBCs    Neutrophils Relative 64 43 - 75 %    Immat GRANS % 0 0 - 2 %    Lymphocytes Relative 31 14 - 44 %    Monocytes Relative 4 4 - 12 %    Eosinophils Relative 1 0 - 6 %    Basophils Relative 0 0 - 1 %    Neutrophils Absolute 7 54 1 85 - 7 62 Thousands/µL    Immature Grans Absolute 0 05 0 00 - 0 20 Thousand/uL    Lymphocytes Absolute 3 57 0 60 - 4 47 Thousands/µL    Monocytes Absolute 0 44 0 17 - 1 22 Thousand/µL    Eosinophils Absolute 0 06 0 00 - 0 61 Thousand/µL    Basophils Absolute 0 05 0 00 - 0 10 Thousands/µL   RPR    Collection Time: 08/03/21  7:32 AM   Result Value Ref Range    RPR Non-Reactive Non-Reactive   Blood gas, arterial, cord    Collection Time: 08/03/21 12:37 PM   Result Value Ref Range    pH, Cord Art 7 446 (H) 7 230 - 7 430    pCO2, Cord Art 29 9 (L) 30 0 - 60 0    pO2, Cord Art      HCO3, Cord Art 20 1 17 3 - 27 3 mmol/L    Base Exc, Cord Art -2 5 (L) 3 0 - 11 0 mmol/L    O2 Content, Cord Art      O2 Hgb, Arterial Cord 89 1 %   Blood gas, venous, cord    Collection Time: 08/03/21 12:37 PM   Result Value Ref Range    pH, Cord Boni 7 400 7 190 - 7 490    pCO2, Cord Boni 33 3 27 0 - 43 0 mm HG    pO2, Cord Boni 52 7 (H) 15 0 - 45 0 mm HG    HCO3, Cord Boni 20 2 12 2 - 28 6 mmol/L    Base Exc, Cord Boni -3 6 (L) 1 0 - 9 0 mmol/L    O2 Cont, Cord Boni 20 0 mL/dL    O2 HGB,VENOUS CORD 93 3 %         Negra Mccrary MD  8/4/2021  6:10 AM

## 2021-08-05 VITALS
RESPIRATION RATE: 18 BRPM | HEIGHT: 62 IN | OXYGEN SATURATION: 98 % | HEART RATE: 74 BPM | TEMPERATURE: 97.7 F | BODY MASS INDEX: 34.78 KG/M2 | DIASTOLIC BLOOD PRESSURE: 56 MMHG | WEIGHT: 189 LBS | SYSTOLIC BLOOD PRESSURE: 98 MMHG

## 2021-08-05 PROCEDURE — 99024 POSTOP FOLLOW-UP VISIT: CPT | Performed by: OBSTETRICS & GYNECOLOGY

## 2021-08-05 RX ORDER — ACETAMINOPHEN AND CODEINE PHOSPHATE 120; 12 MG/5ML; MG/5ML
1 SOLUTION ORAL DAILY
Qty: 28 TABLET | Refills: 3 | Status: SHIPPED | OUTPATIENT
Start: 2021-08-05 | End: 2021-10-14 | Stop reason: ALTCHOICE

## 2021-08-05 RX ADMIN — IBUPROFEN 600 MG: 600 TABLET, FILM COATED ORAL at 13:56

## 2021-08-05 RX ADMIN — DOCUSATE SODIUM 100 MG: 100 CAPSULE ORAL at 09:32

## 2021-08-05 NOTE — PLAN OF CARE
Problem: PAIN - ADULT  Goal: Verbalizes/displays adequate comfort level or baseline comfort level  Description: Interventions:  - Encourage patient to monitor pain and request assistance  - Assess pain using appropriate pain scale  - Administer analgesics based on type and severity of pain and evaluate response  - Implement non-pharmacological measures as appropriate and evaluate response  - Consider cultural and social influences on pain and pain management  - Notify physician/advanced practitioner if interventions unsuccessful or patient reports new pain  Outcome: Progressing     Problem: INFECTION - ADULT  Goal: Absence or prevention of progression during hospitalization  Description: INTERVENTIONS:  - Assess and monitor for signs and symptoms of infection  - Monitor lab/diagnostic results  - Monitor all insertion sites, i e  indwelling lines, tubes, and drains  - Monitor endotracheal if appropriate and nasal secretions for changes in amount and color  - [place] appropriate cooling/warming therapies per order  - Administer medications as ordered  - Instruct and encourage patient and family to use good hand hygiene technique  - Identify and instruct in appropriate isolation precautions for identified infection/condition  Outcome: Progressing  Goal: Absence of fever/infection during neutropenic period  Description: INTERVENTIONS:  - Monitor WBC    Outcome: Progressing     Problem: SAFETY ADULT  Goal: Patient will remain free of falls  Description: INTERVENTIONS:  - Educate patient/family on patient safety including physical limitations  - Instruct patient to call for assistance with activity   - Consult OT/PT to assist with strengthening/mobility   - Keep Call bell within reach  - Keep bed low and locked with side rails adjusted as appropriate  - Keep care items and personal belongings within reach  - Initiate and maintain comfort rounds  - Make Fall Risk Sign visible to staff  - Offer Toileting every  Hours, in advance of need  - Initiate/Maintain alarm  - Obtain necessary fall risk management equipment:   - Apply yellow socks and bracelet for high fall risk patients  - Consider moving patient to room near nurses station  Outcome: Progressing  Goal: Maintain or return to baseline ADL function  Description: INTERVENTIONS:  -  Assess patient's ability to carry out ADLs; assess patient's baseline for ADL function and identify physical deficits which impact ability to perform ADLs (bathing, care of mouth/teeth, toileting, grooming, dressing, etc )  - Assess/evaluate cause of self-care deficits   - Assess range of motion  - Assess patient's mobility; develop plan if impaired  - Assess patient's need for assistive devices and provide as appropriate  - Encourage maximum independence but intervene and supervise when necessary  - Involve family in performance of ADLs  - Assess for home care needs following discharge   - Consider OT consult to assist with ADL evaluation and planning for discharge  - Provide patient education as appropriate  Outcome: Progressing  Goal: Maintains/Returns to pre admission functional level  Description: INTERVENTIONS:  - Perform BMAT or MOVE assessment daily    - Set and communicate daily mobility goal to care team and patient/family/caregiver  - Collaborate with rehabilitation services on mobility goals if consulted  - Perform Range of Motion  times a day  - Reposition patient every  hours    - Dangle patient  times a day  - Stand patient  times a day  - Ambulate patient  times a day  - Out of bed to chair  times a day   - Out of bed for meals  times a day  - Out of bed for toileting  - Record patient progress and toleration of activity level   Outcome: Progressing     Problem: DISCHARGE PLANNING  Goal: Discharge to home or other facility with appropriate resources  Description: INTERVENTIONS:  - Identify barriers to discharge w/patient and caregiver  - Arrange for needed discharge resources and transportation as appropriate  - Identify discharge learning needs (meds, wound care, etc )  - Arrange for interpretive services to assist at discharge as needed  - Refer to Case Management Department for coordinating discharge planning if the patient needs post-hospital services based on physician/advanced practitioner order or complex needs related to functional status, cognitive ability, or social support system  Outcome: Progressing

## 2021-08-05 NOTE — PROGRESS NOTES
Progress Note - OB/GYN   Feliz Waddell 37 y o  female MRN: [id number]  Unit/Bed#: L&D 305-01 Encounter: [id number]    Assessment:  Post partum Day #2 s/p , stable, baby in room    Plan:  1) Continue routine post partum care   Encourage ambulation   Encourage breastfeeding   Contraception: Micronor   Anticipate discharge today ()     Subjective/Objective   Chief Complaint:     Post delivery  Patient is doing well  Lochia WNL  Pain well controlled       Subjective:     Pain: yes, cramping, improved with meds  Tolerating PO: yes  Voiding: yes  Flatus: yes  Ambulating: yes  Chest pain: no  Shortness of breath: no  Leg pain: no  Lochia: minimal    Objective:     Vitals: /70 (BP Location: Right arm)   Pulse 66   Temp 98 °F (36 7 °C) (Temporal)   Resp 12   Ht 5' 2" (1 575 m)   Wt 85 7 kg (189 lb)   LMP 10/25/2020 (Exact Date)   SpO2 98%   Breastfeeding Yes   BMI 34 57 kg/m²     I/O        07 - / 0700 / 07 - 08/05 0700 08/05 0701 - /06 0700    I V  (mL/kg) 1650 (19 3)      Total Intake(mL/kg) 1650 (19 3)      Urine (mL/kg/hr) 1850 (0 9)      Blood 346      Total Output 2196      Net -546                   Lab Results   Component Value Date    WBC 11 71 (H) 2021    HGB 11 5 2021    HCT 35 1 2021    MCV 79 (L) 2021     (H) 2021       Physical Exam:     Gen: AAOx3, NAD  CV: RRR  Lungs: CTA b/l  Abd: Soft, non-tender, non-distended, no rebound or guarding  Uterine fundus firm and non-tender, at the level of the umbilicus  Ext: Non tender    Tamara Lara MD  2021  7:04 AM

## 2021-08-05 NOTE — DISCHARGE INSTRUCTIONS
Vaginal Delivery   WHAT YOU SHOULD KNOW:   A vaginal delivery is the birth of your baby through your vagina (birth canal)  AFTER YOU LEAVE:   Medicines:  · NSAIDs  help decrease swelling and pain or fever  This medicine is available with or without a doctor's order  NSAIDs can cause stomach bleeding or kidney problems in certain people  If you take blood thinner medicine, always ask your healthcare provider if NSAIDs are safe for you  Always read the medicine label and follow directions  · Take your medicine as directed  Call your healthcare provider if you think your medicine is not helping or if you have side effects  Tell him if you are allergic to any medicine  Keep a list of the medicines, vitamins, and herbs you take  Include the amounts, and when and why you take them  Bring the list or the pill bottles to follow-up visits  Carry your medicine list with you in case of an emergency  Follow up with your primary healthcare provider:  Most women need to return 6 weeks after a vaginal delivery  Ask about how to care for your wounds or stitches  Write down your questions so you remember to ask them during your visits  Activity:  Rest as much as possible  Try to keep all activities short  You may be able to do some exercise soon after you have your baby  Talk with your primary healthcare provider before you start exercising  If you work outside the home, ask when you can return to your job  Kegel exercises:  Kegel exercises may help your vaginal and rectal muscles heal faster  You can do Kegel exercises by tightening and relaxing the muscles around your vagina  Kegel exercises help make the muscles stronger  Breast care:  When your milk comes in, your breasts may feel full and hard  Ask how to care for your breasts, even if you are not breastfeeding  Constipation:  Do not try to push the bowel movement out if it is too hard   High-fiber foods, extra liquids, and regular exercise can help you prevent constipation  Examples of high-fiber foods are fruit and bran  Prune juice and water are good liquids to drink  Regular exercise helps your digestive system work  You may also be told to take over-the-counter fiber and stool softener medicines  Take these items as directed  Hemorrhoids:  Pregnancy can cause severe hemorrhoids  You may have rectal pain because of the hemorrhoids  Ask how to prevent or treat hemorrhoids  Perineum care: Your perineum is the area between your vagina and anus  Keep the area clean and dry to help it heal and to prevent infection  Wash the area gently with soap and water when you bathe or shower  Rinse your perineum with warm water when you use the toilet  Your primary healthcare provider may suggest you use a warm sitz bath to help decrease pain  A sitz bath is a bathtub or basin filled to hip level  Stay in the sitz bath for 20 to 30 minutes, or as directed  Vaginal discharge: You will have vaginal discharge, called lochia, after your delivery  The lochia is bright red the first day or two after the birth  By the fourth day, the amount decreases, and it turns red-brown  Use a sanitary pad rather than a tampon to prevent a vaginal infection  It is normal to have lochia up to 8 weeks after your baby is born  Monthly periods: Your period may start again within 7 to 12 weeks after your baby is born  If you are breastfeeding, it may take longer for your period to start again  You can still get pregnant again even though you do not have your monthly period  Talk with your primary healthcare provider about a birth control method that will be good for you if you do not want to get pregnant  Mood changes: Many new mothers have some kind of mood changes after delivery  Some of these changes occur because of lack of sleep, hormone changes, and caring for a new baby  Some mood changes can be more serious, such as postpartum depression   Talk with your primary healthcare provider if you feel unable to care for yourself or your baby  Sexual activity:  You may need to avoid sex for 6 to 7 weeks after you have your baby  You may notice you have a decreased desire for sex, or sex may be painful  You may need to use a vaginal lubricant (gel) to help make sex more comfortable  Contact your primary healthcare provider if:   · You have heavy vaginal bleeding that fills 1 or more sanitary pads in 1 hour  · You have a fever  · Your pain does not go away, or gets worse  · The skin between your vagina and rectum is swollen, warm, or red  · You have swollen, hard, or painful breasts  · You feel very sad or depressed  · You feel more tired than usual      · You have questions or concerns about your condition or care  Seek care immediately or call 911 if:   · You have pus or yellow drainage coming from your vagina or wound  · You are urinating very little, or not at all  · Your arm or leg feels warm, tender, and painful  It may look swollen and red  · You feel lightheaded, have sudden and worsening chest pain, or trouble breathing  You may have more pain when you take deep breaths or cough, or you may cough up blood  © 2014 [address] is for End User's use only and may not be sold, redistributed or otherwise used for commercial purposes  All illustrations and images included in CareNotes® are the copyrighted property of [employer]  or [employer]  The above information is an  only  It is not intended as medical advice for individual conditions or treatments  Talk to your doctor, nurse or pharmacist before following any medical regimen to see if it is safe and effective for you  Complications of Infection   WHAT YOU NEED TO KNOW:   What are complications of infection? Complications can happen if an infection is not diagnosed and treated early  Some infections may have complications even when they are treated early  The infection can spread from one place in your body to the entire body through your bloodstream  Early diagnosis and treatment may prevent complications such as bacteremia, sepsis, and septic shock  These are serious, life-threatening conditions that need immediate treatment  What types of complications can happen? · Bacteremia  is when there is bacteria in the blood  Bacteremia can happen when infections in other parts of the body, such as the lungs, kidneys, or skin, travel to the blood  It can also happen when indwelling catheters, such as a central venous access devices, pacemaker wires, or urinary catheters become infected  A central venous access device is a special IV that is placed in a large vein and left there for an extended period of time  · Sepsis  happens when an infection spreads and causes the body to react strongly to the germs  The body's defense system normally releases chemicals to fight off infection at the infected area  In sepsis, chemicals are released throughout the body  The chemicals cause inflammation and can cause clotting in small blood vessels that is difficult to control  Inflammation and clotting decreases blood flow and oxygen to organs  This may cause them to stop working correctly  Sepsis is also called systemic inflammatory response syndrome (SIRS) due to infection  · Septic shock  is a severe type of sepsis that happens as sepsis gets worse and causes multiple organs to shut down  The blood pressure drops very low and organs do not get enough blood  This may cause permanent damage to organs  What increases my risk for complications of infection?    · Treatment in a hospital for a serious illness or having an indwelling catheter    · Being very young or very old    · A chronic condition, such as COPD, heart failure, or diabetes    · A weak immune system from a long-term condition or medicine    · A recent surgery or dental procedure    · Severe injuries, such as large burns    What are the signs and symptoms that an infection has become worse? · Fever or very low body temperature with chills and violent shaking    · Swelling in the ankles or legs    · A change in mental status such as confusion, loss of consciousness, or seizures    · A fast or irregular heartbeat    · Urinating very little or not at all    · Difficulty breathing, dizziness, or weakness    · A rash or warm, red skin    How are complications of infection diagnosed? · Measurement of your vital signs  may show an abnormal temperature, heart rate, or blood pressure  · Blood and urine tests  will show infection, what germ is causing your illness, organ function, and give information about your overall health  · Blood gases  will show how much oxygen and carbon dioxide is in the blood  The results will tell healthcare providers how well your lungs, heart, and kidneys are working  · An x-ray, ultrasound, CT, or MRI  may show the source of infection  You may be given contrast liquid to swallow or in your IV to help the infection show up better in the pictures  Tell the healthcare provider if you have ever had an allergic reaction to any type of contrast liquid  Do not enter the MRI room with anything metal  Metal can cause serious injury  Tell the healthcare provider if you have any metal in or on your body  · An EKG  is a test that records your heart rhythm and how fast your heart beats  It is used to check for abnormal heart rhythms and damage to the heart from infection  · An echocardiogram  is a type of ultrasound  Sound waves are used to show the structure and function of your heart  The test may show if the infection has spread to the heart valves  · A lumbar puncture (spinal tap)  may be done to test your cerebrospinal fluid (CSF) for germs or signs of infection in or around your brain  CSF is the fluid that surrounds your spinal column and your brain      How are complications of infection treated? You may  need any of the following depending on how severe the complications are:  · Removal or change of a catheter  may be needed to get rid of the infection  · Medicines  may be given to increase your blood pressure and blood flow to your organs  Antibiotics may be given to treat an infection  Medicines may also be given to decrease inflammation, control your blood sugar, prevent stomach ulcers, and prevent blood clots  · Surgery or other procedures  may be needed to treat problems causing sepsis or related to the complications of your infection  This may include draining an abscess or removing infected tissue  What can I do to prevent an infection? The follow can help prevent an infection, or keep an infection from getting worse:      · Wash your hands often  Wash your hands several times each day  Wash after you use the bathroom, change a child's diaper, and before you prepare or eat food  Use soap and water every time  Rub your soapy hands together, lacing your fingers  Wash the front and back of your hands, and in between your fingers  Use the fingers of one hand to scrub under the fingernails of the other hand  Wash for at least 20 seconds  Rinse with warm, running water for several seconds  Then dry your hands with a clean towel or paper towel  Use hand  that contains alcohol if soap and water are not available  Do not touch your eyes, nose, or mouth without washing your hands first          · Cover a sneeze or cough  Use a tissue that covers your mouth and nose  Throw the tissue away in a trash can right away  Use the bend of your arm if a tissue is not available  Wash your hands well with soap and water or use a hand   · Clean surfaces often  Clean doorknobs, countertops, cell phones, and other surfaces that are touched often  Use a disinfecting wipe, a single-use sponge, or a cloth you can wash and reuse  Use disinfecting  if you do not have wipes   You can create a disinfecting  by mixing 1 part bleach with 10 parts water  · Ask about vaccines you may need  Vaccines help prevent infection from some viruses and bacteria  Get the influenza (flu) vaccine as soon as recommended each year  The flu vaccine is usually available starting in September or October  Flu viruses change, so it is important to get a flu vaccine every year  Get the pneumonia vaccine if recommended  This vaccine is usually recommended every 5 years  Your provider will tell you when to get this vaccine, if needed  Your healthcare provider can tell you if you should get other vaccines, and when to get them  Call your local emergency number [phone] in the [address] [facility] or have someone call if:   · You have any of the following signs of a heart attack:      ? Squeezing, pressure, or pain in your chest    ? You may  also have any of the following:     § Discomfort or pain in your back, neck, jaw, stomach, or arm    § Shortness of breath    § Nausea or vomiting    § Lightheadedness or a sudden cold sweat    · You have a seizure or lose consciousness  · You have trouble breathing  · Your lips or fingernails are blue  · You feel extremely weak and have a hard time moving  When should I seek immediate care? · Your symptoms, such as fever, get worse, even if you are taking medicine to treat the infection  · You have increased swelling in your legs, feet, or abdomen  · You feel weak, dizzy, or faint  · You stop urinating or urinate very little  When should I call my doctor? · You have questions or concerns about your condition or care  CARE AGREEMENT:   You have the right to help plan your care  Learn about your health condition and how it may be treated  Discuss treatment options with your healthcare providers to decide what care you want to receive  You always have the right to refuse treatment  The above information is an  only   It is not intended as medical advice for individual conditions or treatments  Talk to your doctor, nurse or pharmacist before following any medical regimen to see if it is safe and effective for you  © Copyright [employer] 2021 Information is for End User's use only and may not be sold, redistributed or otherwise used for commercial purposes   All illustrations and images included in CareNotes® are the copyrighted property of [employer]  or [address] [place] [address]

## 2021-08-05 NOTE — PLAN OF CARE
Problem: PAIN - ADULT  Goal: Verbalizes/displays adequate comfort level or baseline comfort level  Description: Interventions:  - Encourage patient to monitor pain and request assistance  - Assess pain using appropriate pain scale  - Administer analgesics based on type and severity of pain and evaluate response  - Implement non-pharmacological measures as appropriate and evaluate response  - Consider cultural and social influences on pain and pain management  - Notify physician/advanced practitioner if interventions unsuccessful or patient reports new pain  8/5/2021 1328 by Micheline Roca RN  Outcome: Completed  8/5/2021 0917 by Micheline Roca RN  Outcome: Progressing     Problem: INFECTION - ADULT  Goal: Absence or prevention of progression during hospitalization  Description: INTERVENTIONS:  - Assess and monitor for signs and symptoms of infection  - Monitor lab/diagnostic results  - Monitor all insertion sites, i e  indwelling lines, tubes, and drains  - Monitor endotracheal if appropriate and nasal secretions for changes in amount and color  - [place] appropriate cooling/warming therapies per order  - Administer medications as ordered  - Instruct and encourage patient and family to use good hand hygiene technique  - Identify and instruct in appropriate isolation precautions for identified infection/condition  8/5/2021 1328 by Micheline Roca RN  Outcome: Completed  8/5/2021 0917 by Micheline Roca RN  Outcome: Progressing  Goal: Absence of fever/infection during neutropenic period  Description: INTERVENTIONS:  - Monitor WBC    8/5/2021 1328 by Micheline Roca RN  Outcome: Completed  8/5/2021 0917 by Micheline Roca RN  Outcome: Progressing     Problem: SAFETY ADULT  Goal: Patient will remain free of falls  Description: INTERVENTIONS:  - Educate patient/family on patient safety including physical limitations  - Instruct patient to call for assistance with activity   - Consult OT/PT to assist with strengthening/mobility   - Keep Call bell within reach  - Keep bed low and locked with side rails adjusted as appropriate  - Keep care items and personal belongings within reach  - Initiate and maintain comfort rounds  - Make Fall Risk Sign visible to staff  - Offer Toileting every  Hours, in advance of need  - Initiate/Maintain alarm  - Obtain necessary fall risk management equipment:   - Apply yellow socks and bracelet for high fall risk patients  - Consider moving patient to room near nurses station  8/5/2021 1328 by Socorro Sacks, RN  Outcome: Completed  8/5/2021 0917 by Socorro Sacks, RN  Outcome: Progressing  Goal: Maintain or return to baseline ADL function  Description: INTERVENTIONS:  -  Assess patient's ability to carry out ADLs; assess patient's baseline for ADL function and identify physical deficits which impact ability to perform ADLs (bathing, care of mouth/teeth, toileting, grooming, dressing, etc )  - Assess/evaluate cause of self-care deficits   - Assess range of motion  - Assess patient's mobility; develop plan if impaired  - Assess patient's need for assistive devices and provide as appropriate  - Encourage maximum independence but intervene and supervise when necessary  - Involve family in performance of ADLs  - Assess for home care needs following discharge   - Consider OT consult to assist with ADL evaluation and planning for discharge  - Provide patient education as appropriate  8/5/2021 1328 by Socorro Sacks, RN  Outcome: Completed  8/5/2021 0917 by Socorro Sacks, RN  Outcome: Progressing  Goal: Maintains/Returns to pre admission functional level  Description: INTERVENTIONS:  - Perform BMAT or MOVE assessment daily    - Set and communicate daily mobility goal to care team and patient/family/caregiver  - Collaborate with rehabilitation services on mobility goals if consulted  - Perform Range of Motion  times a day  - Reposition patient every  hours    - Dangle patient  times a day  - Stand patient  times a day  - Ambulate patient  times a day  - Out of bed to chair  times a day   - Out of bed for meals  times a day  - Out of bed for toileting  - Record patient progress and toleration of activity level   8/5/2021 1328 by Moses Torres RN  Outcome: Completed  8/5/2021 0917 by Moses Torres RN  Outcome: Progressing     Problem: DISCHARGE PLANNING  Goal: Discharge to home or other facility with appropriate resources  Description: INTERVENTIONS:  - Identify barriers to discharge w/patient and caregiver  - Arrange for needed discharge resources and transportation as appropriate  - Identify discharge learning needs (meds, wound care, etc )  - Arrange for interpretive services to assist at discharge as needed  - Refer to Case Management Department for coordinating discharge planning if the patient needs post-hospital services based on physician/advanced practitioner order or complex needs related to functional status, cognitive ability, or social support system  8/5/2021 1328 by Moses Torres RN  Outcome: Completed  8/5/2021 0917 by Moses Torres RN  Outcome: Progressing

## 2021-08-05 NOTE — PLAN OF CARE
Problem: PAIN - ADULT  Goal: Verbalizes/displays adequate comfort level or baseline comfort level  Description: Interventions:  - Encourage patient to monitor pain and request assistance  - Assess pain using appropriate pain scale  - Administer analgesics based on type and severity of pain and evaluate response  - Implement non-pharmacological measures as appropriate and evaluate response  - Consider cultural and social influences on pain and pain management  - Notify physician/advanced practitioner if interventions unsuccessful or patient reports new pain  Outcome: Progressing     Problem: INFECTION - ADULT  Goal: Absence or prevention of progression during hospitalization  Description: INTERVENTIONS:  - Assess and monitor for signs and symptoms of infection  - Monitor lab/diagnostic results  - Monitor all insertion sites, i e  indwelling lines, tubes, and drains  - Monitor endotracheal if appropriate and nasal secretions for changes in amount and color  - [place] appropriate cooling/warming therapies per order  - Administer medications as ordered  - Instruct and encourage patient and family to use good hand hygiene technique  - Identify and instruct in appropriate isolation precautions for identified infection/condition  Outcome: Progressing  Goal: Absence of fever/infection during neutropenic period  Description: INTERVENTIONS:  - Monitor WBC    Outcome: Progressing     Problem: SAFETY ADULT  Goal: Patient will remain free of falls  Description: INTERVENTIONS:  - Educate patient/family on patient safety including physical limitations  - Instruct patient to call for assistance with activity   - Consult OT/PT to assist with strengthening/mobility   - Keep Call bell within reach  - Keep bed low and locked with side rails adjusted as appropriate  - Keep care items and personal belongings within reach  - Initiate and maintain comfort rounds  - Make Fall Risk Sign visible to staff  - Offer Toileting every Hours, in advance of need  - Initiate/Maintain alarm  - Obtain necessary fall risk management equipment:  - Apply yellow socks and bracelet for high fall risk patients  - Consider moving patient to room near nurses station  Outcome: Progressing  Goal: Maintain or return to baseline ADL function  Description: INTERVENTIONS:  -  Assess patient's ability to carry out ADLs; assess patient's baseline for ADL function and identify physical deficits which impact ability to perform ADLs (bathing, care of mouth/teeth, toileting, grooming, dressing, etc )  - Assess/evaluate cause of self-care deficits   - Assess range of motion  - Assess patient's mobility; develop plan if impaired  - Assess patient's need for assistive devices and provide as appropriate  - Encourage maximum independence but intervene and supervise when necessary  - Involve family in performance of ADLs  - Assess for home care needs following discharge   - Consider OT consult to assist with ADL evaluation and planning for discharge  - Provide patient education as appropriate  Outcome: Progressing  Goal: Maintains/Returns to pre admission functional level  Description: INTERVENTIONS:  - Perform BMAT or MOVE assessment daily    - Set and communicate daily mobility goal to care team and patient/family/caregiver  - Collaborate with rehabilitation services on mobility goals if consulted  - Perform Range of Motion times a day  - Reposition patient every hours    - Dangle patient times a day  - Stand patient times a day  - Ambulate patient times a day  - Out of bed to chair times a day   - Out of bed for meals times a day  - Out of bed for toileting  - Record patient progress and toleration of activity level   Outcome: Progressing     Problem: DISCHARGE PLANNING  Goal: Discharge to home or other facility with appropriate resources  Description: INTERVENTIONS:  - Identify barriers to discharge w/patient and caregiver  - Arrange for needed discharge resources and transportation as appropriate  - Identify discharge learning needs (meds, wound care, etc )  - Arrange for interpretive services to assist at discharge as needed  - Refer to Case Management Department for coordinating discharge planning if the patient needs post-hospital services based on physician/advanced practitioner order or complex needs related to functional status, cognitive ability, or social support system  Outcome: Progressing

## 2021-08-11 LAB — PLACENTA IN STORAGE: NORMAL

## 2021-08-24 ENCOUNTER — TELEPHONE (OUTPATIENT)
Dept: OBGYN CLINIC | Facility: MEDICAL CENTER | Age: 36
End: 2021-08-24

## 2021-08-24 NOTE — LETTER
August 24, 2021     Patient: Mirza Carmona   YOB: 1985           To Whom it May Concern:    Mirza Carmona is under my professional care  She may return to work on 8/31/2021  No heaving lifting or strenuous physical activity  while at work until next appointment on 9/14/2021  If you have any questions or concerns, please don't hesitate to call           Sincerely,          Jon Jordan MD        CC: No Recipients

## 2021-08-24 NOTE — TELEPHONE ENCOUNTER
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

## 2021-08-24 NOTE — TELEPHONE ENCOUNTER
Pt called in delivered 8/3 and she is requesting a release note to go back to work she has not been seen for her post partum visit scheduled for 9/14  Please review and call pt

## 2021-08-31 ENCOUNTER — TELEPHONE (OUTPATIENT)
Dept: OBGYN CLINIC | Facility: MEDICAL CENTER | Age: 36
End: 2021-08-31

## 2021-08-31 DIAGNOSIS — R30.0 DYSURIA: Primary | ICD-10-CM

## 2021-08-31 NOTE — TELEPHONE ENCOUNTER
Pt complains of fullness sensation, unable to void completley with a burning sensation, sending for Urine culture

## 2021-09-14 ENCOUNTER — POSTPARTUM VISIT (OUTPATIENT)
Dept: OBGYN CLINIC | Facility: MEDICAL CENTER | Age: 36
End: 2021-09-14

## 2021-09-14 VITALS
WEIGHT: 175 LBS | DIASTOLIC BLOOD PRESSURE: 66 MMHG | HEIGHT: 62 IN | SYSTOLIC BLOOD PRESSURE: 100 MMHG | BODY MASS INDEX: 32.2 KG/M2

## 2021-09-14 DIAGNOSIS — R87.810 ASCUS WITH POSITIVE HIGH RISK HPV CERVICAL: ICD-10-CM

## 2021-09-14 DIAGNOSIS — Z30.011 ENCOUNTER FOR INITIAL PRESCRIPTION OF CONTRACEPTIVE PILLS: ICD-10-CM

## 2021-09-14 DIAGNOSIS — R87.610 ASCUS WITH POSITIVE HIGH RISK HPV CERVICAL: ICD-10-CM

## 2021-09-14 PROBLEM — O35.8XX0 RENAL AGENESIS OF FETUS AFFECTING ANTEPARTUM CARE OF MOTHER: Status: RESOLVED | Noted: 2021-03-17 | Resolved: 2021-09-14

## 2021-09-14 PROBLEM — Z3A.40 40 WEEKS GESTATION OF PREGNANCY: Status: RESOLVED | Noted: 2021-06-07 | Resolved: 2021-09-14
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
## 2021-09-14 PROBLEM — O99.210 OBESITY AFFECTING PREGNANCY, ANTEPARTUM: Status: RESOLVED | Noted: 2018-09-04 | Resolved: 2021-09-14

## 2021-09-14 PROCEDURE — 99024 POSTOP FOLLOW-UP VISIT: CPT | Performed by: OBSTETRICS & GYNECOLOGY

## 2021-09-14 PROCEDURE — G0476 HPV COMBO ASSAY CA SCREEN: HCPCS | Performed by: OBSTETRICS & GYNECOLOGY

## 2021-09-14 PROCEDURE — G0145 SCR C/V CYTO,THINLAYER,RESCR: HCPCS | Performed by: OBSTETRICS & GYNECOLOGY

## 2021-09-14 RX ORDER — NORETHINDRONE ACETATE AND ETHINYL ESTRADIOL 1MG-20(21)
1 KIT ORAL DAILY
Qty: 90 TABLET | Refills: 3 | Status: SHIPPED | OUTPATIENT
Start: 2021-09-14 | End: 2021-10-14 | Stop reason: ALTCHOICE

## 2021-09-14 NOTE — PROGRESS NOTES
Post partum Visit      1   - 8/3/21-  8lb 5 oz boy, 1st degree laceration     2  Breast feeding - exclusive or supplementation    Breast pain or mastitis   [facility] for lactation     3  Post partum depression screening    Risk - 0   Support at [facility]    4  Sex - yes without complains     5  Contraception / future fertility - unsure about next baby - start ocp with the next cycle     6  Return to work -    7  Weight    Starting 195     Delivery 188   Total gain - 6 lbs      Current - 175    8    Annual    Pap 20- ASCUS + HPV , Negative Colpo    Next due repeat now (12 months)    Explained the next few steps

## 2021-09-15 LAB
HPV HR 12 DNA CVX QL NAA+PROBE: NEGATIVE
HPV16 DNA CVX QL NAA+PROBE: NEGATIVE
HPV18 DNA CVX QL NAA+PROBE: NEGATIVE

## 2021-09-17 LAB
LAB AP GYN PRIMARY INTERPRETATION: NORMAL
Lab: NORMAL

## 2021-10-14 ENCOUNTER — OFFICE VISIT (OUTPATIENT)
Dept: OBGYN CLINIC | Facility: MEDICAL CENTER | Age: 36
End: 2021-10-14
Payer: COMMERCIAL

## 2021-10-14 VITALS
HEIGHT: 62 IN | DIASTOLIC BLOOD PRESSURE: 80 MMHG | WEIGHT: 174 LBS | BODY MASS INDEX: 32.02 KG/M2 | SYSTOLIC BLOOD PRESSURE: 130 MMHG

## 2021-10-14 DIAGNOSIS — N93.9 ABNORMAL UTERINE BLEEDING: Primary | ICD-10-CM

## 2021-10-14 DIAGNOSIS — Z30.41 ORAL CONTRACEPTIVE PILL SURVEILLANCE: ICD-10-CM

## 2021-10-14 DIAGNOSIS — E01.0 THYROMEGALY: ICD-10-CM

## 2021-10-14 PROCEDURE — 99213 OFFICE O/P EST LOW 20 MIN: CPT | Performed by: STUDENT IN AN ORGANIZED HEALTH CARE EDUCATION/TRAINING PROGRAM

## 2021-10-14 RX ORDER — DESOGESTREL AND ETHINYL ESTRADIOL 21-5 (28)
1 KIT ORAL DAILY
Qty: 84 TABLET | Refills: 3 | Status: SHIPPED | OUTPATIENT
Start: 2021-10-14 | End: 2021-12-27 | Stop reason: SDUPTHER

## 2021-12-27 DIAGNOSIS — Z30.41 ORAL CONTRACEPTIVE PILL SURVEILLANCE: ICD-10-CM

## 2021-12-27 RX ORDER — DESOGESTREL AND ETHINYL ESTRADIOL 21-5 (28)
1 KIT ORAL DAILY
Qty: 84 TABLET | Refills: 3 | Status: SHIPPED | OUTPATIENT
Start: 2021-12-27 | End: 2022-12-27

## 2022-12-08 NOTE — DISCHARGE INSTRUCTIONS
Postpartum Bleeding   WHAT YOU NEED TO KNOW:   Postpartum bleeding is vaginal bleeding after childbirth  This bleeding is normal, whether your baby was born vaginally or by   It contains blood and the tissue that lined the inside of your uterus when you were pregnant  DISCHARGE INSTRUCTIONS:   What to expect with postpartum bleeding:  Postpartum bleeding usually lasts at least 10 days, and may last longer than 6 weeks  Your bleeding may range from light (barely staining a pad) to heavy (soaking a pad in 1 hour)  Usually, you have heavier bleeding right after childbirth, which slows over the next few weeks until it stops  The bleeding is red or dark brown with clots for the first 1 to 3 days  It then turns pink for several days, and then becomes a white or yellow discharge until it ends  Follow up with your obstetrician as directed:  Do not have sex until your obstetrician says it is okay  Write down your questions so you remember to ask them during your visits  Contact your healthcare provider or obstetrician if:   · Your bleeding increases, or you have heavy bleeding that soaks a pad in 1 hour for 2 hours in a row  · You pass large blood clots  · You are breathing faster than normal, or your heart is beating faster than normal     · You are urinating less than usual, or not at all  · You feel dizzy  · You have questions or concerns about your condition or care  Seek immediate care or call 911 if:   · You are suddenly short of breath and feel lightheaded  · You have sudden chest pain  ©  [address] [employer] Information is for End User's use only and may not be sold, redistributed or otherwise used for commercial purposes  All illustrations and images included in CareNotes® are the copyrighted property of [employer]  or [employer]  The above information is an  only   It is not intended as medical advice for individual conditions or treatments  Talk to your doctor, nurse or pharmacist before following any medical regimen to see if it is safe and effective for you  Breast Care for the Breastfeeding Mother   WHAT YOU NEED TO KNOW:   Your breasts will go through normal changes while you are breastfeeding  Sometimes breast and nipple problems can develop while you are breastfeeding  Learn about changes that are normal and those that may be a problem  Breast care can help you prevent and manage problems so you and your baby can enjoy the benefits of breastfeeding  DISCHARGE INSTRUCTIONS:   Contact your healthcare provider if:   · You have a fever and chills  · You have body aches and you feel like you do not have any energy  · Your breast is red, swollen, hard, or painful  · Your breast feels warm or hot  · You have breast engorgement that does not get better within 24 hours  · You see or feel a lump in your breast that hurts when you touch it  · You have nipple pain during breastfeeding or between feedings  · Your nipples are red, dry, cracked, or bleeding, or they have scabs on them  · You have questions or concerns about your condition or care  Breast changes:   · For the first few days after your baby is born, your body makes a small amount of breast milk (colostrum)  Within about 2 to 5 days, your body will begin making mature milk  It may take 10 days or longer for mature milk to come in  When your mature milk comes in, your breasts will become full and firm  They may feel tender  · Breastfeeding your baby will decrease the full feeling in your breasts  You may feel a tingly sensation during feedings as milk is released from your breasts  This is called the milk let-down reflex  After 7 or more days, the fullness may feel like it has decreased  Your nipples should look the same as they did before you started breastfeeding   Breasts that feel full before and empty after breastfeeding are signs that breastfeeding is going well  Breast problems that can occur:   · Nipple soreness  may occur when you begin to breastfeed your baby  You may have nipple soreness if your baby does not latch on to your breast correctly  Correct positioning and latch-on may decrease or stop the pain in your nipples  Work with your healthcare provider to help your baby latch on correctly  It may also be helpful to place warm, wet compresses on your nipples to help decrease pain  · Plugged milk ducts  may cause painful breast lumps  Plugged ducts may be caused by not emptying your breasts completely during feedings  Pump out any milk left in your breasts after your baby is done breastfeeding  Do not wear tight tops, tight bras, or underwire bras  They may put pressure on your breasts  · Engorgement  may occur as your milk comes in soon after you begin breastfeeding  Engorgement may cause your breasts to become swollen and painful  Your breasts may also become engorged if you miss a feeding or you do not breastfeed on demand  The best way to decrease engorgement symptoms is to empty your breasts by feeding your baby often  Engorgement can make it hard for your baby to latch on to your breast  If this happens, express a small amount of milk and then have your baby latch on  A cool, wet washcloth may help decrease swelling and pain in your engorged breast  Ask how long and how often to use a cool washcloth  · A breast infection called mastitis  can develop if you have plugged milk ducts or engorgement  Mastitis causes your breasts to become red, swollen, and painful  You may also have flu-like symptoms, such as chills and a fever  Apply a wet, warm washcloth on your breast to help decrease the pain  Ask your healthcare provider how often to do this  You may need to take pain medicine, such as ibuprofen, to help decrease pain and swelling  You may also need antibiotics to treat a bacterial infection   Ask your healthcare provider about feeding your baby when you have a breast infection  How to help prevent breast problems:   · Learn how to position your baby and latch him on correctly  To latch your baby correctly to your breast, make sure that his mouth covers most of your areola (dark area around your nipple)  Your baby is latched on well if you feel comfortable and do not feel pain  A correct latch helps him get enough milk and can help to prevent sore nipples and other breast problems  There are several breastfeeding positions that you can try  Find the position that works best for you and your baby  Ask your healthcare provider for more information about how to hold and breastfeed your baby  · Prevent biting  Your baby may get teeth at about 1to 3months of age  To help prevent biting, break his suction once he is finished breastfeeding or if he has fallen asleep  To break his suction, slip a finger into the side of his mouth  If your baby bites you, respond with surprise or unhappiness  Offer praise when he does not bite you  · Breastfeed your baby regularly  Feed your baby 8 to 12 times a day  You may need to wake your baby at night to feed him  Your baby should breastfeed from both breasts equally over the course of a day  If your baby only feeds from 1 side during a feeding, offer your other breast to him first for the next feeding  · Schedule and keep follow-up visits  Talk to your healthcare provider during follow-up visits if you have breast problems  Healthcare providers may suggest that you see a lactation consultant or join a breastfeeding support group  Follow up with your healthcare provider as directed:  Write down your questions so you remember to ask them during your visits  © 2017 [employer] [employer] Information is for End User's use only and may not be sold, redistributed or otherwise used for commercial purposes   All illustrations and images included in CareNotes® are the copyrighted property of [employer]  or [employer]  The above information is an  only  It is not intended as medical advice for individual conditions or treatments  Talk to your doctor, nurse or pharmacist before following any medical regimen to see if it is safe and effective for you  Spironolactone Pregnancy And Lactation Text: This medication can cause feminization of the male fetus and should be avoided during pregnancy. The active metabolite is also found in breast milk.

## 2023-04-25 NOTE — TELEPHONE ENCOUNTER
Patient notified and given locations /times for testing for the Covid -19 
ok
4 = No assist / stand by assistance